# Patient Record
Sex: MALE | Race: WHITE | Employment: OTHER | URBAN - NONMETROPOLITAN AREA
[De-identification: names, ages, dates, MRNs, and addresses within clinical notes are randomized per-mention and may not be internally consistent; named-entity substitution may affect disease eponyms.]

---

## 2017-02-08 ENCOUNTER — TELEPHONE (OUTPATIENT)
Dept: FAMILY MEDICINE | Facility: OTHER | Age: 72
End: 2017-02-08

## 2017-02-08 NOTE — TELEPHONE ENCOUNTER
Panel Management Review  I called the patient and he stated when he is back in town he will see the float nurse to complete the blood pressure check.    Patient has the following on his problem list:       Composite cancer screening  Chart review shows that this patient is due/due soon for the following   Summary:    Patient is due/failing the following:   BP CHECK    Action needed:   Patient needs nurse only appointment.    Type of outreach:    Phone, left message for patient to call back.     Questions for provider review:    None                                                                                                                                    Zuleima FUENTES LPN       Chart routed to Zuleima FUENTES LPN

## 2017-03-15 ENCOUNTER — OFFICE VISIT (OUTPATIENT)
Dept: FAMILY MEDICINE | Facility: OTHER | Age: 72
End: 2017-03-15
Payer: COMMERCIAL

## 2017-03-15 VITALS — SYSTOLIC BLOOD PRESSURE: 170 MMHG | DIASTOLIC BLOOD PRESSURE: 98 MMHG | HEART RATE: 68 BPM

## 2017-03-15 DIAGNOSIS — I10 ESSENTIAL HYPERTENSION WITH GOAL BLOOD PRESSURE LESS THAN 140/90: Primary | ICD-10-CM

## 2017-03-15 DIAGNOSIS — Z01.30 BP CHECK: Primary | ICD-10-CM

## 2017-03-15 PROCEDURE — 99207 ZZC NO CHARGE NURSE ONLY: CPT

## 2017-03-15 RX ORDER — AMLODIPINE BESYLATE 10 MG/1
10 TABLET ORAL DAILY
Qty: 90 TABLET | Refills: 3 | Status: SHIPPED | OUTPATIENT
Start: 2017-03-15 | End: 2017-06-28

## 2017-03-15 NOTE — PROGRESS NOTES
Ben is a 71 year old male who comes in today for a blood pressure check because of ongoing blood pressure monitoring.  Patient is taking medication as prescribed  Patient is tolerating medications well.  Current complaints: none  Patient is monitoring Blood Pressure elsewhere.- Patient states. Have been high. Was in the 200s this morning      Vitals as recorded, a large cuff was used.  left arm  BP Readings from Last 4 Encounters:   12/22/16 152/80   12/08/16 154/80   12/08/16 154/82   07/27/16 136/84       Health Maintenance Due   Topic Date Due     HEPATITIS C SCREENING  11/30/1965     AORTIC ANEURYSM SCREENING (SYSTEM ASSIGNED)  11/30/2012     ADVANCE DIRECTIVE PLANNING Q5 YRS (NO INBASKET)  11/18/2016       Disposition: Onel Malone PA-C notified while patient in the clinic, patient instructed to start Norvasc 10 mg daily and come in 2 weeks for a provider office visit.  Patrica Dugan MA     3/15/2017

## 2017-03-15 NOTE — MR AVS SNAPSHOT
"              After Visit Summary   3/15/2017    Ben Ceron    MRN: [id number]           Patient Information     Date Of Birth          1945        Visit Information        Provider Department      3/15/2017 2:00 PM NHI ZAZUETA NURSE, [facility]        Today's Diagnoses     BP check    -  1       Follow-ups after your visit        Who to contact     If you have questions or need follow up information about today's clinic visit or your schedule please contact [facility] directly at [phone].  Normal or non-critical lab and imaging results will be communicated to you by MyChart, letter or phone within 4 business days after the clinic has received the results. If you do not hear from us within 7 days, please contact the clinic through Livekickhart or phone. If you have a critical or abnormal lab result, we will notify you by phone as soon as possible.  Submit refill requests through Helixis or call your pharmacy and they will forward the refill request to us. Please allow 3 business days for your refill to be completed.          Additional Information About Your Visit        MyChart Information     Helixis lets you send messages to your doctor, view your test results, renew your prescriptions, schedule appointments and more. To sign up, go to [email or web address] . Click on \"Log in\" on the left side of the screen, which will take you to the Welcome page. Then click on \"Sign up Now\" on the right side of the page.     You will be asked to enter the access code listed below, as well as some personal information. Please follow the directions to create your username and password.     Your access code is: [email or web address]  Expires: 2017  3:24 PM     Your access code will  in 90 days. If you need help or a new code, please call your [facility] or [phone].        Care EveryWhere ID     This is your Care EveryWhere ID. This could be used by other organizations to access your " [place] medical records  [id number]        Your Vitals Were     Pulse                   68            Blood Pressure from Last 3 Encounters:   03/15/17 (!) 170/98   12/22/16 152/80   12/08/16 154/80    Weight from Last 3 Encounters:   12/15/16 293 lb (132.9 kg)   12/08/16 289 lb 8 oz (131.3 kg)   12/08/16 289 lb 8 oz (131.3 kg)              Today, you had the following     No orders found for display       Primary Care Provider Office Phone # Fax #    Onel Green PA-C [phone]       [facility] [address] [facility] [address]        Thank you!     Thank you for choosing [facility]  for your care. Our goal is always to provide you with excellent care. Hearing back from our patients is one way we can continue to improve our services. Please take a few minutes to complete the written survey that you may receive in the mail after your visit with us. Thank you!             Your Updated Medication List - Protect others around you: Learn how to safely use, store and throw away your medicines at www.disposemymeds.org.          This list is accurate as of: 3/15/17  3:24 PM.  Always use your most recent med list.                   Brand Name Dispense Instructions for use    ADVIL 200 MG tablet   Generic drug:  ibuprofen     120    2 tabs as needed 2-3x/day       aspirin 81 MG tablet     30 tablet    Take by mouth daily       clobetasol 0.05 % cream    TEMOVATE    60 gram    abby       desoximetasone 0.25 % cream    TOPICORT    100 g    use as directed to legs       ezetimibe 10 MG tablet    ZETIA    30 tablet    Take 1 tablet (10 mg) by mouth daily       finasteride 5 MG tablet    PROSCAR    90 tablet    Take 1 tablet (5 mg) by mouth daily       losartan-hydrochlorothiazide 100-25 MG per tablet    HYZAAR    90 tablet    Take 1 tablet by mouth daily       metoprolol 25 MG 24 hr tablet    TOPROL-XL    180 tablet    Take 2 tablets (50 mg) by mouth daily       order for DME     1  Units    Equipment being ordered: CPAP and related hardware, mask and tubing as well heated humidity equipment.       vitamin D 2000 UNITS tablet      Take 1 tablet by mouth daily.       vitamin E 400 UNIT capsule

## 2017-03-29 ENCOUNTER — OFFICE VISIT (OUTPATIENT)
Dept: FAMILY MEDICINE | Facility: OTHER | Age: 72
End: 2017-03-29
Payer: COMMERCIAL

## 2017-03-29 VITALS
HEIGHT: 68 IN | TEMPERATURE: 96.6 F | SYSTOLIC BLOOD PRESSURE: 138 MMHG | HEART RATE: 68 BPM | BODY MASS INDEX: 44.54 KG/M2 | WEIGHT: 293.9 LBS | DIASTOLIC BLOOD PRESSURE: 80 MMHG | RESPIRATION RATE: 20 BRPM

## 2017-03-29 DIAGNOSIS — I10 ESSENTIAL HYPERTENSION WITH GOAL BLOOD PRESSURE LESS THAN 140/90: ICD-10-CM

## 2017-03-29 DIAGNOSIS — B37.2 YEAST DERMATITIS: Primary | ICD-10-CM

## 2017-03-29 DIAGNOSIS — E78.5 HYPERLIPIDEMIA LDL GOAL <130: ICD-10-CM

## 2017-03-29 PROCEDURE — 99213 OFFICE O/P EST LOW 20 MIN: CPT | Performed by: PHYSICIAN ASSISTANT

## 2017-03-29 RX ORDER — METOPROLOL SUCCINATE 25 MG/1
50 TABLET, EXTENDED RELEASE ORAL DAILY
Qty: 180 TABLET | Refills: 1 | Status: SHIPPED | OUTPATIENT
Start: 2017-03-29 | End: 2017-06-28

## 2017-03-29 RX ORDER — CLOBETASOL PROPIONATE 0.5 MG/G
CREAM TOPICAL
Qty: 60 G | Refills: 1 | Status: SHIPPED | OUTPATIENT
Start: 2017-03-29

## 2017-03-29 ASSESSMENT — PAIN SCALES - GENERAL: PAINLEVEL: NO PAIN (0)

## 2017-03-29 NOTE — MR AVS SNAPSHOT
"              After Visit Summary   3/29/2017    Ben Ceron    MRN: [id number]           Patient Information     Date Of Birth          1945        Visit Information        Provider Department      3/29/2017 9:20 AM Onel Green PA-C [facility]        Today's Diagnoses     Yeast dermatitis    -  1    Essential hypertension with goal blood pressure less than 140/90        Hyperlipidemia LDL goal <130           Follow-ups after your visit        Your next 10 appointments already scheduled     Jun 28, 2017  9:20 AM CDT   Office Visit with Onel Green PA-C   [facility]    [address] [facility] [address] [phone]           Bring a current list of meds and any records pertaining to this visit.  For Physicals, please bring immunization records and any forms needing to be filled out.  Please arrive 10 minutes early to complete paperwork.              Who to contact     If you have questions or need follow up information about today's clinic visit or your schedule please contact [facility] directly at [phone].  Normal or non-critical lab and imaging results will be communicated to you by MyChart, letter or phone within 4 business days after the clinic has received the results. If you do not hear from us within 7 days, please contact the clinic through Nexterrat or phone. If you have a critical or abnormal lab result, we will notify you by phone as soon as possible.  Submit refill requests through Instantis or call your pharmacy and they will forward the refill request to us. Please allow 3 business days for your refill to be completed.          Additional Information About Your Visit        MyChart Information     Instantis lets you send messages to your doctor, view your test results, renew your prescriptions, schedule appointments and more. To sign up, go to [email or web address] . Click on \"Log in\" on the left side of the screen, " "which will take you to the Welcome page. Then click on \"Sign up Now\" on the right side of the page.     You will be asked to enter the access code listed below, as well as some personal information. Please follow the directions to create your username and password.     Your access code is: [email or web address]  Expires: 2017  3:24 PM     Your access code will  in 90 days. If you need help or a new code, please call your [facility] or [phone].        Care EveryWhere ID     This is your Care EveryWhere ID. This could be used by other organizations to access your [place] medical records  [id number]        Your Vitals Were     Pulse Temperature Respirations Height BMI (Body Mass Index)       68 96.6  F (35.9  C) (Oral) 20 5' 8\" (1.727 m) 44.69 kg/m2        Blood Pressure from Last 3 Encounters:   17 138/80   03/15/17 (!) 170/98   16 152/80    Weight from Last 3 Encounters:   17 293 lb 14.4 oz (133.3 kg)   12/15/16 293 lb (132.9 kg)   16 289 lb 8 oz (131.3 kg)              Today, you had the following     No orders found for display         Today's Medication Changes          These changes are accurate as of: 3/29/17  9:39 AM.  If you have any questions, ask your nurse or doctor.               These medicines have changed or have updated prescriptions.        Dose/Directions    clobetasol 0.05 % cream   Commonly known as:  TEMOVATE   This may have changed:  See the new instructions.   Used for:  Yeast dermatitis   Changed by:  Onel Green PA-C        Small amount to affected areas BID   Quantity:  60 g   Refills:  1            Where to get your medicines      These medications were sent to [facility] [address] [place] [address] - [address] [address]     Phone:  [phone]     clobetasol 0.05 % cream    metoprolol 25 MG 24 hr tablet                Primary Care Provider Office Phone # Fax #    Onel Green PA-C [phone]       " [facility] [address] [facility] [address]        Thank you!     Thank you for choosing [facility]  for your care. Our goal is always to provide you with excellent care. Hearing back from our patients is one way we can continue to improve our services. Please take a few minutes to complete the written survey that you may receive in the mail after your visit with us. Thank you!             Your Updated Medication List - Protect others around you: Learn how to safely use, store and throw away your medicines at www.disposemymeds.org.          This list is accurate as of: 3/29/17  9:39 AM.  Always use your most recent med list.                   Brand Name Dispense Instructions for use    ADVIL 200 MG tablet   Generic drug:  ibuprofen     120    Reported on 3/29/2017       amLODIPine 10 MG tablet    NORVASC    90 tablet    Take 1 tablet (10 mg) by mouth daily       aspirin 81 MG tablet     30 tablet    Take by mouth daily       clobetasol 0.05 % cream    TEMOVATE    60 g    Small amount to affected areas BID       desoximetasone 0.25 % cream    TOPICORT    100 g    use as directed to legs       ezetimibe 10 MG tablet    ZETIA    30 tablet    Take 1 tablet (10 mg) by mouth daily       finasteride 5 MG tablet    PROSCAR    90 tablet    Take 1 tablet (5 mg) by mouth daily       losartan-hydrochlorothiazide 100-25 MG per tablet    HYZAAR    90 tablet    Take 1 tablet by mouth daily       metoprolol 25 MG 24 hr tablet    TOPROL-XL    180 tablet    Take 2 tablets (50 mg) by mouth daily       order for DME     1 Units    Equipment being ordered: CPAP and related hardware, mask and tubing as well heated humidity equipment.       TYLENOL PO      As needed       vitamin D 2000 UNITS tablet      Take 1 tablet by mouth daily.       vitamin E 400 UNIT capsule

## 2017-03-29 NOTE — NURSING NOTE
"Chief Complaint   Patient presents with     Hypertension       Initial /80 (BP Location: Right arm, Patient Position: Chair, Cuff Size: Adult Large)  Pulse 68  Temp 96.6  F (35.9  C) (Oral)  Resp 20  Ht 5' 8\" (1.727 m)  Wt 293 lb 14.4 oz (133.3 kg)  BMI 44.69 kg/m2 Estimated body mass index is 44.69 kg/(m^2) as calculated from the following:    Height as of this encounter: 5' 8\" (1.727 m).    Weight as of this encounter: 293 lb 14.4 oz (133.3 kg).  Medication Reconciliation: complete       Zuleima FUENTES LPN      "

## 2017-03-29 NOTE — PROGRESS NOTES
SUBJECTIVE:                                                    Ben Ceron is a 71 year old male who presents to clinic today for the following health issues:      Hypertension Follow-up      Outpatient blood pressures are being checked at home.  Results are slightly higher than we see today.    Low Salt Diet: not monitoring salt       Amount of exercise or physical activity: 4-5 days/week for an average of less than 15 minutes    Problems taking medications regularly: No    Medication side effects: none - question of intertriginous rash to the arm pits.    Diet: low fat/cholesterol    Problem list and histories reviewed & adjusted, as indicated.  Additional history: as documented    Patient Active Problem List   Diagnosis     Advance Care Planning     Essential hypertension with goal blood pressure less than 140/90     Morbid obesity with BMI of 45.0-49.9, adult (H)     Family history of colon cancer     Rash     LUCAS (obstructive sleep apnea)     Benign non-nodular prostatic hyperplasia, presence of lower urinary tract symptoms unspecified     Hyperlipidemia LDL goal <130     Primary osteoarthritis of left knee     Past Surgical History:   Procedure Laterality Date     ARTHROSCOPY KNEE WITH MEDIAL MENISCECTOMY  6/6/2014    Procedure: ARTHROSCOPY KNEE WITH MEDIAL MENISCECTOMY;  Surgeon: Ramana Dominguez DO;  Location: [facility] OR     COLONOSCOPY  11/29/2011    Polypectomy.     COLONOSCOPY N/A 11/12/2014    Procedure: COLONOSCOPY;  Surgeon: Brooks Burris MD;  Location: [facility] GI     HC COLONOSCOPY W BIOPSY  11/24/08     HC COLONOSCOPY W/WO BRUSH/WASH  11/21/2005    Polypectomy.     HC VASECTOMY UNILAT/BILAT W POSTOP SEMEN  1977    Vasectomy       Social History   Substance Use Topics     Smoking status: Never Smoker     Smokeless tobacco: Never Used     Alcohol use 0.0 oz/week     0 Standard drinks or equivalent per week      Comment: occasional     Family History   Problem Relation Age of Onset     CANCER  "Mother      Lung Cancer     Cancer - colorectal Father      Stomach Cancer     CANCER Brother            Reviewed and updated as needed this visit by clinical staff  Tobacco  Allergies  Meds  Med Hx  Surg Hx  Fam Hx  Soc Hx      Reviewed and updated as needed this visit by Provider         ROS:  Constitutional, HEENT, cardiovascular, pulmonary, gi and gu systems are negative, except as otherwise noted.    OBJECTIVE:                                                    /80 (BP Location: Right arm, Patient Position: Chair, Cuff Size: Adult Large)  Pulse 68  Temp 96.6  F (35.9  C) (Oral)  Resp 20  Ht 5' 8\" (1.727 m)  Wt 293 lb 14.4 oz (133.3 kg)  BMI 44.69 kg/m2  Body mass index is 44.69 kg/(m^2).  GENERAL: healthy, alert and no distress  RESP: lungs clear to auscultation - no rales, rhonchi or wheezes  CV: regular rate and rhythm, normal S1 S2, no S3 or S4, no murmur, click or rub, no peripheral edema and peripheral pulses strong  ABDOMEN: soft, nontender, no hepatosplenomegaly, no masses and bowel sounds normal  MS: no gross musculoskeletal defects noted, no edema    Diagnostic Test Results:     ASSESSMENT/PLAN:                                                    1. Yeast dermatitis  Not of great concern - trial of medications.  - clobetasol (TEMOVATE) 0.05 % cream; Small amount to affected areas BID  Dispense: 60 g; Refill: 1    2. Essential hypertension with goal blood pressure less than 140/90  Doing fairly well today.  ROV 3 months.  - metoprolol (TOPROL-XL) 25 MG 24 hr tablet; Take 2 tablets (50 mg) by mouth daily  Dispense: 180 tablet; Refill: 1    3. Hyperlipidemia LDL goal <130  Noted - recheck in 3 months.  Onel Malone PA-C  [facility]    "

## 2017-05-01 ENCOUNTER — ALLIED HEALTH/NURSE VISIT (OUTPATIENT)
Dept: FAMILY MEDICINE | Facility: OTHER | Age: 72
End: 2017-05-01
Payer: COMMERCIAL

## 2017-05-01 VITALS — DIASTOLIC BLOOD PRESSURE: 80 MMHG | SYSTOLIC BLOOD PRESSURE: 154 MMHG | HEART RATE: 68 BPM

## 2017-05-01 DIAGNOSIS — Z01.30 BP CHECK: Primary | ICD-10-CM

## 2017-05-01 PROCEDURE — 99207 ZZC NO CHARGE NURSE ONLY: CPT

## 2017-05-01 NOTE — NURSING NOTE
Ben is a 71 year old male who comes in today for a blood pressure check because of ongoing blood pressure monitoring.  Patient is not taking medication as prescribed  Patient is not tolerating medications well.  Current complaints: light-headedness and stiffness and aches  Patient is monitoring Blood Pressure elsewhere.      Vitals as recorded, a large cuff was used.  left arm  BP Readings from Last 4 Encounters:   05/01/17 154/80   03/29/17 138/80   03/15/17 (!) 170/98   12/22/16 152/80       Health Maintenance Due   Topic Date Due     HEPATITIS C SCREENING  11/30/1965     AORTIC ANEURYSM SCREENING (SYSTEM ASSIGNED)  11/30/2012     ADVANCE DIRECTIVE PLANNING Q5 YRS (NO INBASKET)  11/18/2016       Disposition: Onel Malone PA-C  notified while patient in the clinic, patient instructed to schedule office visit    Luz Maria Garland MA 5/1/2017

## 2017-05-01 NOTE — MR AVS SNAPSHOT
After Visit Summary   5/1/2017    Ben Ceron    MRN: [id number]           Patient Information     Date Of Birth          1945        Visit Information        Provider Department      5/1/2017 10:15 AM NHI ZAZUETA NURSE, [facility]        Today's Diagnoses     BP check    -  1       Follow-ups after your visit        Your next 10 appointments already scheduled     May 02, 2017  3:00 PM CDT   [facility] Onel Green [facility]-C   [facility]    [address] [facility] [address] [phone]           Bring a current list of meds and any records pertaining to this visit.  For Physicals, please bring immunization records and any forms needing to be filled out.  Please arrive 10 minutes early to complete paperwork.            Jun 28, 2017  9:20 AM CDT   Office Visit with Onel Green PA-C   [facility]    [address] [facility] [address] [phone]           Bring a current list of meds and any records pertaining to this visit.  For Physicals, please bring immunization records and any forms needing to be filled out.  Please arrive 10 minutes early to complete paperwork.              Who to contact     If you have questions or need follow up information about today's clinic visit or your schedule please contact [facility] directly at [phone].  Normal or non-critical lab and imaging results will be communicated to you by MyChart, letter or phone within 4 business days after the clinic has received the results. If you do not hear from us within 7 days, please contact the clinic through MyChart or phone. If you have a critical or abnormal lab result, we will notify you by phone as soon as possible.  Submit refill requests through ConnectToHome or call your pharmacy and they will forward the refill request to us. Please allow 3 business days for your refill to be completed.        "   Additional Information About Your Visit        MyChart Information     BrightSun lets you send messages to your doctor, view your test results, renew your prescriptions, schedule appointments and more. To sign up, go to [email or web address] . Click on \"Log in\" on the left side of the screen, which will take you to the Welcome page. Then click on \"Sign up Now\" on the right side of the page.     You will be asked to enter the access code listed below, as well as some personal information. Please follow the directions to create your username and password.     Your access code is: [email or web address]  Expires: 2017  3:24 PM     Your access code will  in 90 days. If you need help or a new code, please call your [place] clinic or [phone].        Care EveryWhere ID     This is your Care EveryWhere ID. This could be used by other organizations to access your [place] medical records  [id number]        Your Vitals Were     Pulse                   68            Blood Pressure from Last 3 Encounters:   17 154/80   17 138/80   03/15/17 (!) 170/98    Weight from Last 3 Encounters:   17 293 lb 14.4 oz (133.3 kg)   12/15/16 293 lb (132.9 kg)   16 289 lb 8 oz (131.3 kg)              Today, you had the following     No orders found for display       Primary Care Provider Office Phone # Fax #    Onel Green PA-C [phone]       [facility] [address] [facility] [address]        Thank you!     Thank you for choosing [facility]  for your care. Our goal is always to provide you with excellent care. Hearing back from our patients is one way we can continue to improve our services. Please take a few minutes to complete the written survey that you may receive in the mail after your visit with us. Thank you!             Your Updated Medication List - Protect others around you: Learn how to safely use, store and throw away your medicines at " www.disposemymeds.org.          This list is accurate as of: 5/1/17 12:00 PM.  Always use your most recent med list.                   Brand Name Dispense Instructions for use    ADVIL 200 MG tablet   Generic drug:  ibuprofen     120    Reported on 3/29/2017       amLODIPine 10 MG tablet    NORVASC    90 tablet    Take 1 tablet (10 mg) by mouth daily       aspirin 81 MG tablet     30 tablet    Take by mouth daily       clobetasol 0.05 % cream    TEMOVATE    60 g    Small amount to affected areas BID       desoximetasone 0.25 % cream    TOPICORT    100 g    use as directed to legs       ezetimibe 10 MG tablet    ZETIA    30 tablet    Take 1 tablet (10 mg) by mouth daily       finasteride 5 MG tablet    PROSCAR    90 tablet    Take 1 tablet (5 mg) by mouth daily       losartan-hydrochlorothiazide 100-25 MG per tablet    HYZAAR    90 tablet    Take 1 tablet by mouth daily       metoprolol 25 MG 24 hr tablet    TOPROL-XL    180 tablet    Take 2 tablets (50 mg) by mouth daily       order for DME     1 Units    Equipment being ordered: CPAP and related hardware, mask and tubing as well heated humidity equipment.       TYLENOL PO      As needed       vitamin D 2000 UNITS tablet      Take 1 tablet by mouth daily.       vitamin E 400 UNIT capsule

## 2017-05-02 ENCOUNTER — OFFICE VISIT (OUTPATIENT)
Dept: FAMILY MEDICINE | Facility: OTHER | Age: 72
End: 2017-05-02
Payer: COMMERCIAL

## 2017-05-02 VITALS
BODY MASS INDEX: 45.13 KG/M2 | TEMPERATURE: 98.7 F | SYSTOLIC BLOOD PRESSURE: 150 MMHG | HEART RATE: 68 BPM | OXYGEN SATURATION: 98 % | DIASTOLIC BLOOD PRESSURE: 80 MMHG | WEIGHT: 296.8 LBS | RESPIRATION RATE: 20 BRPM

## 2017-05-02 DIAGNOSIS — I10 ESSENTIAL HYPERTENSION WITH GOAL BLOOD PRESSURE LESS THAN 140/90: Primary | ICD-10-CM

## 2017-05-02 DIAGNOSIS — I10 HYPERTENSION GOAL BP (BLOOD PRESSURE) < 140/90: ICD-10-CM

## 2017-05-02 DIAGNOSIS — E78.5 HYPERLIPIDEMIA LDL GOAL <130: ICD-10-CM

## 2017-05-02 DIAGNOSIS — E66.01 MORBID OBESITY WITH BMI OF 45.0-49.9, ADULT (H): ICD-10-CM

## 2017-05-02 DIAGNOSIS — M79.10 MYALGIA: ICD-10-CM

## 2017-05-02 LAB
ERYTHROCYTE [DISTWIDTH] IN BLOOD BY AUTOMATED COUNT: 11.9 % (ref 10–15)
HCT VFR BLD AUTO: 42.3 % (ref 40–53)
HGB BLD-MCNC: 15 G/DL (ref 13.3–17.7)
MCH RBC QN AUTO: 31.6 PG (ref 26.5–33)
MCHC RBC AUTO-ENTMCNC: 35.5 G/DL (ref 31.5–36.5)
MCV RBC AUTO: 89 FL (ref 78–100)
PLATELET # BLD AUTO: 281 10E9/L (ref 150–450)
RBC # BLD AUTO: 4.74 10E12/L (ref 4.4–5.9)
WBC # BLD AUTO: 12 10E9/L (ref 4–11)

## 2017-05-02 PROCEDURE — 83721 ASSAY OF BLOOD LIPOPROTEIN: CPT | Performed by: PHYSICIAN ASSISTANT

## 2017-05-02 PROCEDURE — 82550 ASSAY OF CK (CPK): CPT | Performed by: PHYSICIAN ASSISTANT

## 2017-05-02 PROCEDURE — 99214 OFFICE O/P EST MOD 30 MIN: CPT | Performed by: PHYSICIAN ASSISTANT

## 2017-05-02 PROCEDURE — 85027 COMPLETE CBC AUTOMATED: CPT | Performed by: PHYSICIAN ASSISTANT

## 2017-05-02 PROCEDURE — 36415 COLL VENOUS BLD VENIPUNCTURE: CPT | Performed by: PHYSICIAN ASSISTANT

## 2017-05-02 PROCEDURE — 80053 COMPREHEN METABOLIC PANEL: CPT | Performed by: PHYSICIAN ASSISTANT

## 2017-05-02 ASSESSMENT — PAIN SCALES - GENERAL: PAINLEVEL: EXTREME PAIN (8)

## 2017-05-02 NOTE — PROGRESS NOTES
SUBJECTIVE:                                                    Ben Ceron is a 71 year old male who presents to clinic today for the following health issues:      Hypertension Follow-up      Outpatient blood pressures are being checked at home and [facility].  Results are he help quite ill and had to lie down to recover. Blood pressure was checked and he reports 80/50 to the left arm and 100/52 to the right arm about the same point in time. Currently today is 150/80. He states he has had a couple of episodes similar to the feeling that he had when he had these abnormal blood pressures and wonders about cardiac arrhythmias and decreased pump function. I must admit that this sounds logical. We discussed this at length.not monitoring salt.    Low Salt Diet: not monitoring salt       Amount of exercise or physical activity: None    Problems taking medications regularly: No    Medication side effects: none    Diet: Not pay much attention to diet.    Problem list and histories reviewed & adjusted, as indicated.  Additional history: as documented    Patient Active Problem List   Diagnosis     Advance Care Planning     Essential hypertension with goal blood pressure less than 140/90     Morbid obesity with BMI of 45.0-49.9, adult (H)     Family history of colon cancer     Rash     LUCAS (obstructive sleep apnea)     Benign non-nodular prostatic hyperplasia, presence of lower urinary tract symptoms unspecified     Hyperlipidemia LDL goal <130     Primary osteoarthritis of left knee     Past Surgical History:   Procedure Laterality Date     ARTHROSCOPY KNEE WITH MEDIAL MENISCECTOMY  6/6/2014    Procedure: ARTHROSCOPY KNEE WITH MEDIAL MENISCECTOMY;  Surgeon: Ramana Dominguez DO;  Location: [facility]     COLONOSCOPY  11/29/2011    Polypectomy.     COLONOSCOPY N/A 11/12/2014    Procedure: COLONOSCOPY;  Surgeon: Brooks Burris MD;  Location:  GI     HC COLONOSCOPY W BIOPSY  11/24/08     HC COLONOSCOPY W/WO BRUSH/WASH   11/21/2005    Polypectomy.     HC VASECTOMY UNILAT/BILAT W POSTOP SEMEN  1977    Vasectomy       Social History   Substance Use Topics     Smoking status: Never Smoker     Smokeless tobacco: Never Used     Alcohol use 0.0 oz/week     0 Standard drinks or equivalent per week      Comment: occasional     Family History   Problem Relation Age of Onset     CANCER Mother      Lung Cancer     Cancer - colorectal Father      Stomach Cancer     CANCER Brother            Reviewed and updated as needed this visit by clinical staff  Tobacco  Allergies  Med Hx  Surg Hx  Fam Hx  Soc Hx      Reviewed and updated as needed this visit by Provider         ROS:  Constitutional, HEENT, cardiovascular, pulmonary, gi and gu systems are negative, except as otherwise noted.    OBJECTIVE:                                                    /80 (BP Location: Left arm, Patient Position: Chair, Cuff Size: Adult Large)  Pulse 68  Temp 98.7  F (37.1  C) (Oral)  Resp 20  Wt 296 lb 12.8 oz (134.6 kg)  SpO2 98%  BMI 45.13 kg/m2  Body mass index is 45.13 kg/(m^2).  GENERAL: healthy, alert and no distress  RESP: lungs clear to auscultation - no rales, rhonchi or wheezes  CV: regular rate and rhythm, normal S1 S2, no S3 or S4, no murmur, click or rub, no peripheral edema and peripheral pulses strong  ABDOMEN: soft, nontender, no hepatosplenomegaly, no masses and bowel sounds normal  MS: no gross musculoskeletal defects noted, no edema  PSYCH: mentation appears normal, affect normal/bright    Routine today to suggest an abnormal heart rate and rhythm. We'll have him submit for cardiac testing.      ASSESSMENT/PLAN:                                                    1. Essential hypertension with goal blood pressure less than 140/90  2. Hyperlipidemia LDL goal <130  3. Morbid obesity with BMI of 45.0-49.9, adult (H)  4. Hypertension goal BP (blood pressure) < 140/90  . Myalgia  Suspected to have a pump function issue.  - Exercise Stress  Echocardiogram; Future  - CBC with platelets  - Comprehensive metabolic panel  - CK total  - LDL cholesterol directe    ALEJANDRO PRFRENCH Malone PA-C  [facility]

## 2017-05-02 NOTE — LETTER
STRESS ECHO TEST   PAMPHLET: STRESS ECHOCARDIOGRAPH    Date of Appointment:__MONDAY, May 8__________________Time: _10:00am_______                                                                                     Speed at the central registration desk.    INSTRUCTIONS    1. NO CAFFEINE  the day of the test.  Examples: tea, coffee, pop, Anacin, Excedrin and chocolate products.  Other products/medications may contain caffeine, if in doubt, read the labels or call your pharmacist.    2. You may eat lightly up to three hours prior to the test.    3. No smoking on the day of the test.    4. Plan sixty to ninety minutes for your test.    5. Wear a comfortable two piece outfit and comfortable walking shoes.    6. If you wear a Nitro-Patch, do not put one on the morning of the test.    7. Take your medications in the morning with the exception of beta blockers. (refer to list below)    8.  Do not take your BETA BLOCKERS the day before and the day of your test.      BETA BLOCKERS  Acebutolol, Atenolol, Beta-Chron, Betapace, Betaxolol, Bisoprolol,Blocadren, Carteolol, Cartrol, Carvediol, Coreg, Corgard, Corzide, Dectral, Fumarate, Inderal, Inderal LA, Inderide, Kerlone, Labetolol, Levatolol, Lopressor, Lopressor HCT, Metoprolol, Metoprolol XL, Nadolol, Normodyne, Penbutolol, Pindolol, Propanolol, Propanolol LA, Sectral, Sotalol, Tenoretic, Tenormin, Timolide, Timolol, Toprol XL, Trandate, Visken, Zebeta, Ziac    If you have significant problems with your lower extremities which would restrict you from walking at a brisk pace for five to ten minutes, please notify your physician.    Please bring a list of your medications along with you to this appointment.    If you have any questions please call us at [phone] or [phone] Monday through Friday 8am to 5pm.                    PATIENT INFORMATION  - STRESS ECHOCARDIOGRAPHY    The stress echocardiogram is a non-invasive test that combines a treadmill test and an  echocardiogram.  An echocardiogram is done at rest prior to exercise and again after exercising.  The echocardiogram uses sound waves (ultrasound) to provide an image of the heart.    You will be asked to fill out an information sheet regarding your current health status.  Questions will be asked related to your heart history.    The nurse or exercise physiologist will explain the procedure to you.  The upper chest will be exposed for placement of electrodes.  Women may wear a hospital gown during the test.  Men with chest hair will be shaved in the areas where the electrodes are placed.    You will be connected to an electrocardiogram machine.  An electrocardiogram and blood pressure will be monitored with you lying down and again standing still prior to exercising.    An echocardiogram will be done prior to exercising.  After this is completed, you will be ready for the treadmill portion of the test.    With staff in attendance, the treadmill will be started.  Periodically the speed and the grade of the treadmill will increase.  Let the staff know if you are experiencing chest pain, shortness of breath or other symptoms.    The length of time on the treadmill is individual.  However, the usual length of time is from five to ten minutes of walking.    After the heart rate is achieved to give us an accurate test, you will need to lie down very quickly and have an echocardiogram done with your heart rate elevated.    Please allow 60-90 minutes for the test to be completed.    The physician will review the results with you immediately after the test and send a final report to your Primary Care Provider.    If you have any special needs, please notify the department.  If you need an , please notify the department.  If you have questions, you may call the Outpatient Specialty Scheduling Department at [phone] or [phone] Monday through Friday 8am to 5pm.

## 2017-05-02 NOTE — MR AVS SNAPSHOT
After Visit Summary   5/2/2017    Ben Ceron    MRN: [id number]           Patient Information     Date Of Birth          1945        Visit Information        Provider Department      5/2/2017 3:00 PM Onel Green PA-C [facility]        Today's Diagnoses     Essential hypertension with goal blood pressure less than 140/90    -  1    Hyperlipidemia LDL goal <130        Morbid obesity with BMI of 45.0-49.9, adult (H)        Hypertension goal BP (blood pressure) < 140/90        Myalgia           Follow-ups after your visit        Your next 10 appointments already scheduled     Jun 28, 2017  9:20 AM CDT   Office Visit with Onel Green PA-C   [facility]    [address] [facility] [address] [phone]           Bring a current list of meds and any records pertaining to this visit.  For Physicals, please bring immunization records and any forms needing to be filled out.  Please arrive 10 minutes early to complete paperwork.              Future tests that were ordered for you today     Open Future Orders        Priority Expected Expires Ordered    Exercise Stress Echocardiogram Routine  5/2/2018 5/2/2017            Who to contact     If you have questions or need follow up information about today's clinic visit or your schedule please contact [facility] directly at [phone].  Normal or non-critical lab and imaging results will be communicated to you by MyChart, letter or phone within 4 business days after the clinic has received the results. If you do not hear from us within 7 days, please contact the clinic through MyChart or phone. If you have a critical or abnormal lab result, we will notify you by phone as soon as possible.  Submit refill requests through [employer] or call your pharmacy and they will forward the refill request to us. Please allow 3 business days for your refill to be completed.          Additional  "Information About Your Visit        MyChart Information     [employer] lets you send messages to your doctor, view your test results, renew your prescriptions, schedule appointments and more. To sign up, go to [email or web address] . Click on \"Log in\" on the left side of the screen, which will take you to the Welcome page. Then click on \"Sign up Now\" on the right side of the page.     You will be asked to enter the access code listed below, as well as some personal information. Please follow the directions to create your username and password.     Your access code is: [email or web address]  Expires: 2017  3:24 PM     Your access code will  in 90 days. If you need help or a new code, please call your [place] clinic or [phone].        Care EveryWhere ID     This is your Care EveryWhere ID. This could be used by other organizations to access your [place] medical records  [id number]        Your Vitals Were     Pulse Temperature Respirations Pulse Oximetry BMI (Body Mass Index)       68 98.7  F (37.1  C) (Oral) 20 98% 45.13 kg/m2        Blood Pressure from Last 3 Encounters:   17 150/80   17 154/80   17 138/80    Weight from Last 3 Encounters:   17 296 lb 12.8 oz (134.6 kg)   17 293 lb 14.4 oz (133.3 kg)   12/15/16 293 lb (132.9 kg)              We Performed the Following     CBC with platelets     CK total     Comprehensive metabolic panel     LDL cholesterol direct        Primary Care Provider Office Phone # Fax #    Onel Green PA-C [phone]       [facility] [address] [facility] [address]        Thank you!     Thank you for choosing [facility]  for your care. Our goal is always to provide you with excellent care. Hearing back from our patients is one way we can continue to improve our services. Please take a few minutes to complete the written survey that you may receive in the mail after your visit with us. Thank you!           "   Your Updated Medication List - Protect others around you: Learn how to safely use, store and throw away your medicines at www.disposemymeds.org.          This list is accurate as of: 5/2/17  3:25 PM.  Always use your most recent med list.                   Brand Name Dispense Instructions for use    ADVIL 200 MG tablet   Generic drug:  ibuprofen     120    Reported on 5/2/2017       amLODIPine 10 MG tablet    NORVASC    90 tablet    Take 1 tablet (10 mg) by mouth daily       aspirin 81 MG tablet     30 tablet    Take by mouth daily       clobetasol 0.05 % cream    TEMOVATE    60 g    Small amount to affected areas BID       desoximetasone 0.25 % cream    TOPICORT    100 g    use as directed to legs       ezetimibe 10 MG tablet    ZETIA    30 tablet    Take 1 tablet (10 mg) by mouth daily       finasteride 5 MG tablet    PROSCAR    90 tablet    Take 1 tablet (5 mg) by mouth daily       losartan-hydrochlorothiazide 100-25 MG per tablet    HYZAAR    90 tablet    Take 1 tablet by mouth daily       metoprolol 25 MG 24 hr tablet    TOPROL-XL    180 tablet    Take 2 tablets (50 mg) by mouth daily       order for DME     1 Units    Equipment being ordered: CPAP and related hardware, mask and tubing as well heated humidity equipment.       TYLENOL PO      As needed       vitamin D 2000 UNITS tablet      Take 1 tablet by mouth daily.       vitamin E 400 UNIT capsule

## 2017-05-02 NOTE — NURSING NOTE
"Chief Complaint   Patient presents with     Hypertension       Initial /80 (BP Location: Left arm, Patient Position: Chair, Cuff Size: Adult Large)  Pulse 68  Temp 98.7  F (37.1  C) (Oral)  Resp 20  Wt 296 lb 12.8 oz (134.6 kg)  BMI 45.13 kg/m2 Estimated body mass index is 45.13 kg/(m^2) as calculated from the following:    Height as of 3/29/17: 5' 8\" (1.727 m).    Weight as of this encounter: 296 lb 12.8 oz (134.6 kg).  Medication Reconciliation: complete       Zuleima FUENTES LPN    "

## 2017-05-03 ENCOUNTER — TELEPHONE (OUTPATIENT)
Dept: FAMILY MEDICINE | Facility: OTHER | Age: 72
End: 2017-05-03

## 2017-05-03 DIAGNOSIS — I10 ESSENTIAL HYPERTENSION WITH GOAL BLOOD PRESSURE LESS THAN 140/90: ICD-10-CM

## 2017-05-03 DIAGNOSIS — E11.9 CONTROLLED TYPE 2 DIABETES MELLITUS WITHOUT COMPLICATION, WITHOUT LONG-TERM CURRENT USE OF INSULIN (H): ICD-10-CM

## 2017-05-03 DIAGNOSIS — E66.01 MORBID OBESITY WITH BMI OF 45.0-49.9, ADULT (H): ICD-10-CM

## 2017-05-03 DIAGNOSIS — E78.5 HYPERLIPIDEMIA LDL GOAL <100: ICD-10-CM

## 2017-05-03 DIAGNOSIS — E78.5 HYPERLIPIDEMIA LDL GOAL <100: Primary | ICD-10-CM

## 2017-05-03 LAB
ALBUMIN SERPL-MCNC: 3.9 G/DL (ref 3.4–5)
ALP SERPL-CCNC: 157 U/L (ref 40–150)
ALT SERPL W P-5'-P-CCNC: 28 U/L (ref 0–70)
ANION GAP SERPL CALCULATED.3IONS-SCNC: 13 MMOL/L (ref 3–14)
AST SERPL W P-5'-P-CCNC: 12 U/L (ref 0–45)
BILIRUB SERPL-MCNC: 0.3 MG/DL (ref 0.2–1.3)
BUN SERPL-MCNC: 24 MG/DL (ref 7–30)
CALCIUM SERPL-MCNC: 8.9 MG/DL (ref 8.5–10.1)
CHLORIDE SERPL-SCNC: 102 MMOL/L (ref 94–109)
CK SERPL-CCNC: 117 U/L (ref 30–300)
CO2 SERPL-SCNC: 24 MMOL/L (ref 20–32)
CREAT SERPL-MCNC: 0.87 MG/DL (ref 0.66–1.25)
GFR SERPL CREATININE-BSD FRML MDRD: 87 ML/MIN/1.7M2
GLUCOSE SERPL-MCNC: 215 MG/DL (ref 70–99)
HBA1C MFR BLD: 6.6 % (ref 4.3–6)
LDLC SERPL DIRECT ASSAY-MCNC: 81 MG/DL
POTASSIUM SERPL-SCNC: 3.7 MMOL/L (ref 3.4–5.3)
PROT SERPL-MCNC: 7.4 G/DL (ref 6.8–8.8)
SODIUM SERPL-SCNC: 139 MMOL/L (ref 133–144)

## 2017-05-03 PROCEDURE — 36415 COLL VENOUS BLD VENIPUNCTURE: CPT | Performed by: PHYSICIAN ASSISTANT

## 2017-05-03 PROCEDURE — 83036 HEMOGLOBIN GLYCOSYLATED A1C: CPT | Performed by: PHYSICIAN ASSISTANT

## 2017-05-08 ENCOUNTER — HOSPITAL ENCOUNTER (OUTPATIENT)
Dept: CARDIOLOGY | Facility: CLINIC | Age: 72
Discharge: HOME | End: 2017-05-08
Attending: PHYSICIAN ASSISTANT | Admitting: PHYSICIAN ASSISTANT
Payer: MEDICARE

## 2017-05-08 ENCOUNTER — ALLIED HEALTH/NURSE VISIT (OUTPATIENT)
Dept: EDUCATION SERVICES | Facility: CLINIC | Age: 72
End: 2017-05-08
Payer: COMMERCIAL

## 2017-05-08 DIAGNOSIS — E66.01 MORBID OBESITY WITH BMI OF 45.0-49.9, ADULT (H): ICD-10-CM

## 2017-05-08 DIAGNOSIS — E11.9 DIABETES MELLITUS WITHOUT COMPLICATION (H): Primary | ICD-10-CM

## 2017-05-08 DIAGNOSIS — I10 ESSENTIAL HYPERTENSION WITH GOAL BLOOD PRESSURE LESS THAN 140/90: ICD-10-CM

## 2017-05-08 DIAGNOSIS — M79.10 MYALGIA: ICD-10-CM

## 2017-05-08 DIAGNOSIS — I10 HYPERTENSION GOAL BP (BLOOD PRESSURE) < 140/90: ICD-10-CM

## 2017-05-08 PROCEDURE — 93017 CV STRESS TEST TRACING ONLY: CPT | Performed by: REHABILITATION PRACTITIONER

## 2017-05-08 PROCEDURE — 93018 CV STRESS TEST I&R ONLY: CPT | Performed by: INTERNAL MEDICINE

## 2017-05-08 PROCEDURE — 40000264 ECHO STRESS WITH OPTISON

## 2017-05-08 PROCEDURE — G0108 DIAB MANAGE TRN  PER INDIV: HCPCS

## 2017-05-08 PROCEDURE — 25500064 ZZH RX 255 OP 636: Performed by: INTERNAL MEDICINE

## 2017-05-08 PROCEDURE — 93350 STRESS TTE ONLY: CPT | Mod: 26 | Performed by: INTERNAL MEDICINE

## 2017-05-08 PROCEDURE — 93325 DOPPLER ECHO COLOR FLOW MAPG: CPT | Mod: 26 | Performed by: INTERNAL MEDICINE

## 2017-05-08 PROCEDURE — 93321 DOPPLER ECHO F-UP/LMTD STD: CPT | Mod: 26 | Performed by: INTERNAL MEDICINE

## 2017-05-08 PROCEDURE — 93016 CV STRESS TEST SUPVJ ONLY: CPT | Performed by: INTERNAL MEDICINE

## 2017-05-08 RX ADMIN — HUMAN ALBUMIN MICROSPHERES AND PERFLUTREN 2 ML: 10; .22 INJECTION, SOLUTION INTRAVENOUS at 10:44

## 2017-05-08 NOTE — MR AVS SNAPSHOT
After Visit Summary   5/8/2017    Ben Ceron    MRN: [id number]           Patient Information     Date Of Birth          1945        Visit Information        Provider Department      5/8/2017 11:00 AM [facility] DIABETIC ED [facility]        Today's Diagnoses     Diabetes mellitus without complication (H)    -  1       Follow-ups after your visit        Your next 10 appointments already scheduled     Jun 28, 2017  9:20 AM CDT   Office Visit with Onel Green PA-C   [facility] ([facility]    [address] [facility] [address] [phone]           Bring a current list of meds and any records pertaining to this visit.  For Physicals, please bring immunization records and any forms needing to be filled out.  Please arrive 10 minutes early to complete paperwork.              Future tests that were ordered for you today     Open Future Orders        Priority Expected Expires Ordered    ECHO STRESS WITH OPTISON Routine  5/2/2018 5/2/2017            Who to contact     If you have questions or need follow up information about today's clinic visit or your schedule please contact [facility] directly at [phone].  Normal or non-critical lab and imaging results will be communicated to you by [employer]zCentral[employer], letter or phone within 4 business days after the clinic has received the results. If you do not hear from us within 7 days, please contact the clinic through [employer] or phone. If you have a critical or abnormal lab result, we will notify you by phone as soon as possible.  Submit refill requests through [employer] or call your pharmacy and they will forward the refill request to us. Please allow 3 business days for your refill to be completed.          Additional Information About Your Visit        [employer] Information     [employer] lets you send messages to your doctor, view your test results, renew your prescriptions, schedule appointments  "and more. To sign up, go to [email or web address] . Click on \"Log in\" on the left side of the screen, which will take you to the Welcome page. Then click on \"Sign up Now\" on the right side of the page.     You will be asked to enter the access code listed below, as well as some personal information. Please follow the directions to create your username and password.     Your access code is: [email or web address]  Expires: 2017  3:24 PM     Your access code will  in 90 days. If you need help or a new code, please call your [place] clinic or [phone].        Care EveryWhere ID     This is your Care EveryWhere ID. This could be used by other organizations to access your [place] medical records  [id number]         Blood Pressure from Last 3 Encounters:   17 150/80   17 154/80   17 138/80    Weight from Last 3 Encounters:   17 134.6 kg (296 lb 12.8 oz)   17 133.3 kg (293 lb 14.4 oz)   12/15/16 132.9 kg (293 lb)              We Performed the Following     DIABETES EDUCATION - Individual  []        Primary Care Provider Office Phone # Fax #    Onel Green PA-C [phone]       [facility] [address] [facility] [address]        Thank you!     Thank you for choosing [facility]  for your care. Our goal is always to provide you with excellent care. Hearing back from our patients is one way we can continue to improve our services. Please take a few minutes to complete the written survey that you may receive in the mail after your visit with us. Thank you!             Your Updated Medication List - Protect others around you: Learn how to safely use, store and throw away your medicines at www.disposemymeds.org.          This list is accurate as of: 17 12:59 PM.  Always use your most recent med list.                   Brand Name Dispense Instructions for use    ADVIL 200 MG tablet   Generic drug:  ibuprofen     120    Reported on " 5/2/2017       amLODIPine 10 MG tablet    NORVASC    90 tablet    Take 1 tablet (10 mg) by mouth daily       aspirin 81 MG tablet     30 tablet    Take by mouth daily       clobetasol 0.05 % cream    TEMOVATE    60 g    Small amount to affected areas BID       desoximetasone 0.25 % cream    TOPICORT    100 g    use as directed to legs       ezetimibe 10 MG tablet    ZETIA    30 tablet    Take 1 tablet (10 mg) by mouth daily       finasteride 5 MG tablet    PROSCAR    90 tablet    Take 1 tablet (5 mg) by mouth daily       losartan-hydrochlorothiazide 100-25 MG per tablet    HYZAAR    90 tablet    Take 1 tablet by mouth daily       metFORMIN 500 MG tablet    GLUCOPHAGE    180 tablet    Take 1 tablet (500 mg) by mouth 2 times daily (with meals)       metoprolol 25 MG 24 hr tablet    TOPROL-XL    180 tablet    Take 2 tablets (50 mg) by mouth daily       order for DME     1 Units    Equipment being ordered: CPAP and related hardware, mask and tubing as well heated humidity equipment.       TYLENOL PO      As needed       vitamin D 2000 UNITS tablet      Take 1 tablet by mouth daily.       vitamin E 400 UNIT capsule

## 2017-05-08 NOTE — Clinical Note
Patient seen for initial diabetes ed today. He was not ready to start a meter today. Still adjusting to diabetes diagnosis. He is doing well reducing portions. No follow up scheduled with me at this point but I did encourage him to have A1c rechecked before end of year.   Sandi Jurado RDN, LD, CDE

## 2017-05-08 NOTE — PROGRESS NOTES
Diabetes Self Management Training: Initial Assessment Visit for Newly Diagnosed Patients (Complete AADE Goals Flowsheet)    Ben Ceron presents today for education related to Type 2 diabetes.    He is accompanied by spouse Bia    Patient's diabetes management related comments/concerns: would like to get a FBG checked since last one was afternoon.     Patient's emotional response to diabetes: expresses readiness to learn and denial    Patient would like this visit to be focused around the following diabetes-related behaviors and goals:     ASSESSMENT:  Patient Problem List and Family Medical History reviewed for relevant medical history, current medical status, and diabetes risk factors.    Current Diabetes Management per Patient:  Taking diabetes medications?   yes:     Diabetes Medication(s)     Biguanides Sig    metFORMIN (GLUCOPHAGE) 500 MG tablet Take 1 tablet (500 mg) by mouth 2 times daily (with meals)      Side effects? Yes loose stools but tolerable    Past Diabetes Education: Newly diagnosed    Patient glucose self monitoring as follows: never.     Patient's most recent   Lab Results   Component Value Date    A1C 6.6 05/03/2017    is meeting goal of <7.0    Nutrition:  Patient has been reducing portions    Breakfast - Raisin Bran cereal, strawberries, small amount of sugar mix with artificial sweetener, some days coffee.   Lunch - after golf had [employer] burger / ham sandwich and small piece of cake.   Dinner -  salad   Snacks - fruit for evening snack.   Venison roast, squash, coleslaw with apple,     Beverages: 2% milk, more water lately, orange juice a bottle recently, occasional diet soda, rarely alcohol    Cultural/Christian diet restrictions: No     Biggest Challenge to Healthy Eating: portion control    Physical Activity:    Type: golfing and walking   Limitations: knee trouble and left hip arthritis?     Diabetes Risk Factors:  family history, age over 45 years and  "overweight/obesity    Diabetes Complications:  Not discussed today.    Vitals:  There were no vitals taken for this visit.  Estimated body mass index is 45.13 kg/(m^2) as calculated from the following:    Height as of 3/29/17: 1.727 m (5' 8\").    Weight as of 5/2/17: 134.6 kg (296 lb 12.8 oz).   Last 3 BP:   BP Readings from Last 3 Encounters:   05/02/17 150/80   05/01/17 154/80   03/29/17 138/80       History   Smoking Status     Never Smoker   Smokeless Tobacco     Never Used       Labs:  Lab Results   Component Value Date    A1C 6.6 05/03/2017     Lab Results   Component Value Date     05/02/2017     Lab Results   Component Value Date    LDL 81 05/02/2017    LDL 75 12/08/2016     HDL Cholesterol   Date Value Ref Range Status   12/08/2016 51 >39 mg/dL Final   ]  GFR Estimate   Date Value Ref Range Status   05/02/2017 87 >60 mL/min/1.7m2 Final     Comment:     Non  GFR Calc     GFR Estimate If Black   Date Value Ref Range Status   05/02/2017 >90   GFR Calc   >60 mL/min/1.7m2 Final     Lab Results   Component Value Date    CR 0.87 05/02/2017     No results found for: MICROALBUMIN    Socio/Economic Considerations:    Support system: spouse/significant other    Health Beliefs and Attitudes:   Patient Activation Measure Survey Score:  JENNIFER Score (Last Two) 7/15/2013   JENNIFER Raw Score 39   Activation Score 56.4   JENNIFER Level 3       Stage of Change: ACTION (Actively working towards change)      Diabetes knowledge and skills assessment:     Patient is knowledgeable in diabetes management concepts related to: Healthy Eating    Patient needs further education on the following diabetes management concepts: Healthy Eating, Being Active, Taking Medication, Reducing Risks and Healthy Coping    Barriers to Learning Assessment: No Barriers identified    Based on learning assessment above, most appropriate setting for further diabetes education would be: Group class or Individual " setting.    INTERVENTION:   Education provided today on:  AADE Self-Care Behaviors:  Healthy Eating: carbohydrate counting, portion control and label reading  Being Active: relationship to blood glucose  Taking Medication: side effects of prescribed medications  Reducing Risks: A1C - goals, relating to blood glucose levels, how often to check    Opportunities for ongoing education and support in diabetes-self management were discussed.    Pt verbalized understanding of concepts discussed and recommendations provided today.       Education Materials Provided:  [facility] Understanding Diabetes Booklet    PLAN:  Meal Plan Recommendation: use portion control  Exercise / activity plan: 30 minutes or more daily activity  A1c recheck in 3-6 months    FOLLOW-UP:  Chart routed to referring provider.  Follow up as needed. Patient informed of Medicare hours available first year.     Ongoing plan for education and support: Written resources (magazines, books, etc.) and Follow-up with primary care provider    DWIGHT Rachel RDN, MELINDAE    Time Spent: 60 minutes  Encounter Type: Individual    Any diabetes medication dose changes were made via the CDE Protocol and Collaborative Practice Agreement with the patient's primary care provider. A copy of this encounter was shared with the provider.

## 2017-06-14 ENCOUNTER — TELEPHONE (OUTPATIENT)
Dept: FAMILY MEDICINE | Facility: OTHER | Age: 72
End: 2017-06-14

## 2017-06-14 DIAGNOSIS — I10 ESSENTIAL HYPERTENSION WITH GOAL BLOOD PRESSURE LESS THAN 140/90: ICD-10-CM

## 2017-06-14 DIAGNOSIS — E78.5 HYPERLIPIDEMIA LDL GOAL <100: Primary | ICD-10-CM

## 2017-06-14 DIAGNOSIS — E66.01 MORBID OBESITY WITH BMI OF 45.0-49.9, ADULT (H): ICD-10-CM

## 2017-06-14 NOTE — TELEPHONE ENCOUNTER
I talked to this patient and informed him that Onel Malone PA-C stated he recommended and ultrasound to assess the risk for AAA, per Onel Malone PA-C.  The patient has questions why he needs this test.  He stated he does not want a phone call until after 1:30 in the afternoon tomorrow.

## 2017-06-14 NOTE — TELEPHONE ENCOUNTER
Please call the patient and advise that they should have an ultrasound to assess their risk for AAA.  Please help them arrange.  Electronically signed:    Onel Malone PA-C

## 2017-06-27 ENCOUNTER — HOSPITAL ENCOUNTER (OUTPATIENT)
Dept: ULTRASOUND IMAGING | Facility: CLINIC | Age: 72
Discharge: HOME | End: 2017-06-27
Attending: PHYSICIAN ASSISTANT | Admitting: PHYSICIAN ASSISTANT
Payer: MEDICARE

## 2017-06-27 DIAGNOSIS — E78.5 HYPERLIPIDEMIA LDL GOAL <100: ICD-10-CM

## 2017-06-27 DIAGNOSIS — I10 ESSENTIAL HYPERTENSION WITH GOAL BLOOD PRESSURE LESS THAN 140/90: ICD-10-CM

## 2017-06-27 DIAGNOSIS — E66.01 MORBID OBESITY WITH BMI OF 45.0-49.9, ADULT (H): ICD-10-CM

## 2017-06-27 PROCEDURE — 76775 US EXAM ABDO BACK WALL LIM: CPT

## 2017-06-28 ENCOUNTER — OFFICE VISIT (OUTPATIENT)
Dept: FAMILY MEDICINE | Facility: OTHER | Age: 72
End: 2017-06-28
Payer: COMMERCIAL

## 2017-06-28 VITALS
HEART RATE: 76 BPM | RESPIRATION RATE: 20 BRPM | OXYGEN SATURATION: 99 % | SYSTOLIC BLOOD PRESSURE: 156 MMHG | TEMPERATURE: 96.5 F | WEIGHT: 283.5 LBS | BODY MASS INDEX: 43.11 KG/M2 | DIASTOLIC BLOOD PRESSURE: 90 MMHG

## 2017-06-28 DIAGNOSIS — I10 ESSENTIAL HYPERTENSION WITH GOAL BLOOD PRESSURE LESS THAN 140/90: Primary | ICD-10-CM

## 2017-06-28 PROCEDURE — 99214 OFFICE O/P EST MOD 30 MIN: CPT | Performed by: PHYSICIAN ASSISTANT

## 2017-06-28 RX ORDER — CLONIDINE HYDROCHLORIDE 0.1 MG/1
TABLET ORAL
Qty: 120 TABLET | Refills: 1 | Status: CANCELLED | OUTPATIENT
Start: 2017-06-28

## 2017-06-28 RX ORDER — METOPROLOL SUCCINATE 25 MG/1
100 TABLET, EXTENDED RELEASE ORAL DAILY
Qty: 180 TABLET | Refills: 1 | Status: SHIPPED | OUTPATIENT
Start: 2017-06-28 | End: 2017-08-07

## 2017-06-28 ASSESSMENT — PAIN SCALES - GENERAL: PAINLEVEL: SEVERE PAIN (6)

## 2017-06-28 NOTE — NURSING NOTE
"Chief Complaint   Patient presents with     Hypertension       Initial /90 (BP Location: Left arm, Patient Position: Chair, Cuff Size: Adult Large)  Pulse 76  Temp 96.5  F (35.8  C) (Oral)  Resp 20  Wt 283 lb 8 oz (128.6 kg)  SpO2 99%  BMI 43.11 kg/m2 Estimated body mass index is 43.11 kg/(m^2) as calculated from the following:    Height as of 3/29/17: 5' 8\" (1.727 m).    Weight as of this encounter: 283 lb 8 oz (128.6 kg).  Medication Reconciliation: complete     Zuleima FUENTES LPN      "

## 2017-06-28 NOTE — PROGRESS NOTES
SUBJECTIVE:                                                    Ben Ceron is a 71 year old male who presents to clinic today for the following health issues:      Hypertension Follow-up      Outpatient blood pressures are being checked at home.  Results are similar to what we see today..    Low Salt Diet: no added salt      Amount of exercise or physical activity: 2-3 days/week for an average of less than 15 minutes    Problems taking medications regularly: No    Medication side effects: muscle aches to Norvasc    Diet: low salt, low fat/cholesterol and diabetic    Problem list and histories reviewed & adjusted, as indicated.  Additional history: as documented    Patient Active Problem List   Diagnosis     Advance Care Planning     Essential hypertension with goal blood pressure less than 140/90     Morbid obesity with BMI of 45.0-49.9, adult (H)     Family history of colon cancer     Rash     LUCAS (obstructive sleep apnea)     Benign non-nodular prostatic hyperplasia, presence of lower urinary tract symptoms unspecified     Primary osteoarthritis of left knee     Hyperlipidemia LDL goal <100     Past Surgical History:   Procedure Laterality Date     ARTHROSCOPY KNEE WITH MEDIAL MENISCECTOMY  6/6/2014    Procedure: ARTHROSCOPY KNEE WITH MEDIAL MENISCECTOMY;  Surgeon: Ramana Dominguez DO;  Location: [facility]     COLONOSCOPY  11/29/2011    Polypectomy.     COLONOSCOPY N/A 11/12/2014    Procedure: COLONOSCOPY;  Surgeon: Brooks Burris MD;  Location: [facility] GI     HC COLONOSCOPY W BIOPSY  11/24/08     HC COLONOSCOPY W/WO BRUSH/WASH  11/21/2005    Polypectomy.     HC VASECTOMY UNILAT/BILAT W POSTOP SEMEN  1977    Vasectomy       Social History   Substance Use Topics     Smoking status: Never Smoker     Smokeless tobacco: Never Used     Alcohol use 0.0 oz/week     0 Standard drinks or equivalent per week      Comment: occasional     Family History   Problem Relation Age of Onset     CANCER Mother      Lung Cancer      Cancer - colorectal Father      Stomach Cancer     CANCER Brother            Reviewed and updated as needed this visit by clinical staff  Tobacco  Allergies  Med Hx  Surg Hx  Fam Hx  Soc Hx      Reviewed and updated as needed this visit by Provider         ROS:  Constitutional, HEENT, cardiovascular, pulmonary, gi and gu systems are negative, except as otherwise noted.    OBJECTIVE:     /90 (BP Location: Left arm, Patient Position: Chair, Cuff Size: Adult Large)  Pulse 76  Temp 96.5  F (35.8  C) (Oral)  Resp 20  Wt 283 lb 8 oz (128.6 kg)  SpO2 99%  BMI 43.11 kg/m2  Body mass index is 43.11 kg/(m^2).  GENERAL: healthy, alert and no distress  RESP: lungs clear to auscultation - no rales, rhonchi or wheezes  CV: regular rate and rhythm, normal S1 S2, no S3 or S4, no murmur, click or rub, no peripheral edema and peripheral pulses strong  MS: no gross musculoskeletal defects noted, no edema    Diagnostic Test Results:  Essentially normal recent ECHO and AAA screening imaging noted.    ASSESSMENT/PLAN:     1. Essential hypertension with goal blood pressure less than 140/90  Will have him incrementally increase his Toprol and ROV 4-6 weeks.  Review by cardiology is requested.  - CARDIOLOGY EVAL ADULT REFERRAL  - metoprolol (TOPROL-XL) 25 MG 24 hr tablet; Take 4 tablets (100 mg) by mouth daily  Dispense: 180 tablet; Refill: 1    CONSULTATION/REFERRAL to cardiology to help with HTN optimization.    Onel Malone PA-C  [facility]

## 2017-06-28 NOTE — MR AVS SNAPSHOT
After Visit Summary   6/28/2017    Ben Ceron    MRN: [id number]           Patient Information     Date Of Birth          1945        Visit Information        Provider Department      6/28/2017 9:20 AM Onel Green PA-C [facility]        Today's Diagnoses     Essential hypertension with goal blood pressure less than 140/90    -  1       Follow-ups after your visit        Additional Services     CARDIOLOGY EVAL ADULT REFERRAL       Your provider has referred you to:  [facility]: [facility] [phone]   [email or web address]    Please be aware that coverage of these services is subject to the terms and limitations of your health insurance plan.  Call member services at your health plan with any benefit or coverage questions.      Type of Referral:  Optimize blood pressure    Timeframe requested:  Within 1 month    Please bring the following to your appointment:  >>   Any x-rays, CTs or MRIs which have been performed.  Contact the facility where they were done to arrange for  prior to your scheduled appointment.    >>   List of current medications  >>   This referral request   >>   Any documents/labs given to you for this referral                  Who to contact     If you have questions or need follow up information about today's clinic visit or your schedule please contact [facility] directly at [phone].  Normal or non-critical lab and imaging results will be communicated to you by MyChart, letter or phone within 4 business days after the clinic has received the results. If you do not hear from us within 7 days, please contact the clinic through MyChart or phone. If you have a critical or abnormal lab result, we will notify you by phone as soon as possible.  Submit refill requests through [employer] or call your pharmacy and they will forward the refill request to us.  "Please allow 3 business days for your refill to be completed.          Additional Information About Your Visit        MyChart Information     [employer] lets you send messages to your doctor, view your test results, renew your prescriptions, schedule appointments and more. To sign up, go to [email or web address] . Click on \"Log in\" on the left side of the screen, which will take you to the Welcome page. Then click on \"Sign up Now\" on the right side of the page.     You will be asked to enter the access code listed below, as well as some personal information. Please follow the directions to create your username and password.     Your access code is: [email or web address]  Expires: 2017  9:52 AM     Your access code will  in 90 days. If you need help or a new code, please call your [facility] clinic or [phone].        Care EveryWhere ID     This is your Care EveryWhere ID. This could be used by other organizations to access your [facility] medical records  [id number]        Your Vitals Were     Pulse Temperature Respirations Pulse Oximetry BMI (Body Mass Index)       76 96.5  F (35.8  C) (Oral) 20 99% 43.11 kg/m2        Blood Pressure from Last 3 Encounters:   17 156/90   17 150/80   17 154/80    Weight from Last 3 Encounters:   17 283 lb 8 oz (128.6 kg)   17 296 lb 12.8 oz (134.6 kg)   17 293 lb 14.4 oz (133.3 kg)              We Performed the Following     CARDIOLOGY EVAL ADULT REFERRAL          Today's Medication Changes          These changes are accurate as of: 17  9:52 AM.  If you have any questions, ask your nurse or doctor.               These medicines have changed or have updated prescriptions.        Dose/Directions    metoprolol 25 MG 24 hr tablet   Commonly known as:  TOPROL-XL   This may have changed:  how much to take   Used for:  Essential hypertension with goal blood pressure less than 140/90   Changed by:  Onel Green PA-C        Dose:  100 mg   Take 4 " tablets (100 mg) by mouth daily   Quantity:  180 tablet   Refills:  1         Stop taking these medicines if you haven't already. Please contact your care team if you have questions.     amLODIPine 10 MG tablet   Commonly known as:  NORVASC   Stopped by:  Onel Green PA-C                Where to get your medicines      These medications were sent to [facility] [address] [place] [address] [address]  [address], [facility] [address]     Phone:  [phone]     metoprolol 25 MG 24 hr tablet                Primary Care Provider Office Phone # Fax #    Onel Green PA-C [phone]       [facility] [address] [facility] [address]        Equal Access to Services     REMY SANTAMARIA : Hadii princess stein hadasho Soomaali, waaxda luqadaha, qaybta kaalmada adeegyada, waxwiley maier . So [facility] [phone].    ATENCIÓN: Si habla español, tiene a morales disposición servicios gratuitos de asistencia lingüística. [facility] [phone].    We comply with applicable federal civil rights laws and [place] laws. We do not discriminate on the basis of race, color, national origin, age, disability sex, sexual orientation or gender identity.            Thank you!     Thank you for choosing [facility]  for your care. Our goal is always to provide you with excellent care. Hearing back from our patients is one way we can continue to improve our services. Please take a few minutes to complete the written survey that you may receive in the mail after your visit with us. Thank you!             Your Updated Medication List - Protect others around you: Learn how to safely use, store and throw away your medicines at www.disposemymeds.org.          This list is accurate as of: 6/28/17  9:52 AM.  Always use your most recent med list.                   Brand Name Dispense Instructions for use Diagnosis    ADVIL 200 MG tablet   Generic drug:  ibuprofen     120    Reported on  5/2/2017        aspirin 81 MG tablet     30 tablet    Take by mouth daily        clobetasol 0.05 % cream    TEMOVATE    60 g    Small amount to affected areas BID    Yeast dermatitis       desoximetasone 0.25 % cream    TOPICORT    100 g    use as directed to legs    Rash       ezetimibe 10 MG tablet    ZETIA    30 tablet    Take 1 tablet (10 mg) by mouth daily    Hyperlipidemia LDL goal <130       finasteride 5 MG tablet    PROSCAR    90 tablet    Take 1 tablet (5 mg) by mouth daily    Benign non-nodular prostatic hyperplasia, presence of lower urinary tract symptoms unspecified       losartan-hydrochlorothiazide 100-25 MG per tablet    HYZAAR    90 tablet    Take 1 tablet by mouth daily    Essential hypertension with goal blood pressure less than 140/90       metFORMIN 500 MG tablet    GLUCOPHAGE    180 tablet    Take 1 tablet (500 mg) by mouth 2 times daily (with meals)    Controlled type 2 diabetes mellitus without complication, without long-term current use of insulin (H)       metoprolol 25 MG 24 hr tablet    TOPROL-XL    180 tablet    Take 4 tablets (100 mg) by mouth daily    Essential hypertension with goal blood pressure less than 140/90       order for DME     1 Units    Equipment being ordered: CPAP and related hardware, mask and tubing as well heated humidity equipment.    LUCAS (obstructive sleep apnea), Morbid obesity with BMI of 45.0-49.9, adult (H)       TYLENOL PO      As needed        vitamin D 2000 UNITS tablet      Take 1 tablet by mouth daily.        vitamin E 400 UNIT capsule

## 2017-07-31 DIAGNOSIS — I10 ESSENTIAL HYPERTENSION WITH GOAL BLOOD PRESSURE LESS THAN 140/90: ICD-10-CM

## 2017-07-31 NOTE — TELEPHONE ENCOUNTER
Toprol      Last Written Prescription Date: 6-  Last Fill Quantity: 180, # refills: 1    Last Office Visit with G, P or [facility] prescribing provider:  6-   Future Office Visit:        BP Readings from Last 3 Encounters:   06/28/17 156/90   05/02/17 150/80   05/01/17 154/80     Pharmacy note states Insurance will not cover 4 tablets daily--may need to switch to 100 mg

## 2017-08-01 RX ORDER — METOPROLOL SUCCINATE 100 MG/1
100 TABLET, EXTENDED RELEASE ORAL DAILY
Qty: 90 TABLET | Refills: 1 | Status: SHIPPED | OUTPATIENT
Start: 2017-08-01 | End: 2017-09-18

## 2017-08-01 NOTE — TELEPHONE ENCOUNTER
Routing refill request to provider for review/approval because:  Note from pharmacy: insurance will not cover 4 tablets/day. 100 mg dose pended. Will need provider to review.     Meena Carr RN  [facility]

## 2017-08-07 ENCOUNTER — OFFICE VISIT (OUTPATIENT)
Dept: CARDIOLOGY | Facility: CLINIC | Age: 72
End: 2017-08-07
Payer: COMMERCIAL

## 2017-08-07 VITALS
WEIGHT: 289.5 LBS | OXYGEN SATURATION: 98 % | HEART RATE: 62 BPM | BODY MASS INDEX: 43.87 KG/M2 | SYSTOLIC BLOOD PRESSURE: 156 MMHG | DIASTOLIC BLOOD PRESSURE: 90 MMHG | HEIGHT: 68 IN

## 2017-08-07 DIAGNOSIS — I10 BENIGN ESSENTIAL HYPERTENSION: Primary | ICD-10-CM

## 2017-08-07 PROCEDURE — 99204 OFFICE O/P NEW MOD 45 MIN: CPT | Performed by: INTERNAL MEDICINE

## 2017-08-07 RX ORDER — NIFEDIPINE 30 MG
30 TABLET, EXTENDED RELEASE ORAL DAILY
Qty: 30 TABLET | Refills: 3 | Status: SHIPPED | OUTPATIENT
Start: 2017-08-07 | End: 2017-09-18

## 2017-08-07 NOTE — LETTER
8/7/2017      RE: Ben Ceron  [address] [facility] [address]       Dear Colleague,    Thank you for the opportunity to participate in the care of your patient, Ben Ceron, at the [facility]. Please see a copy of my visit note below.    HISTORY:    Ben Ceron is a 71-year-old gentleman who works as an x-ray tech, semiretired, and accompanied by his wife today. He was asked to see me for assistance in management of hypertension. He has a history of morbid obesity, hyperlipidemia, and obstructive sleep apnea.    Ben reports that he has had elevated blood pressure for at least 15 years. Over the last few years it has been more difficult to control and multiple medications have been used. He was intolerant of lisinopril which caused a cough, and was put on losartan. The dose on this was increased and her chlorothiazide was added. He was also tried on Norvasc which caused him some hypotension, but more substantial symptoms of severe muscle aches ensued.    A recent stress echo showed no evidence of inducible ischemia. His resting ECG is normal. A recent abdominal ultrasound showed a normal size aorta.  TSH is normal as is renal function. The patient was recently diagnosed with borderline diabetes, hemoglobin A1c 6.6 and glucose typically greater than 100 over the last couple of years. He was started on Glucophage.    The patient denies any history of exertional chest, arm, neck, or jaw discomfort. He has been trying to exercise more in the recent past and finds himself to be easily fatigued but as he exercises his stamina has increased. He has not had problems with palpitations, claudication, peripheral edema, PND/orthopnea, syncope or near-syncope, or strokelike symptoms. He states that he eats a low-salt diet and his wife confirms that she cooks most of their food from scratch and does not add salt. He has not had problems with anxiety fevers  or chills or night sweats.      ASSESSMENT/PLAN:    1.  Essential hypertension. The patient is currently using metoprolol 100 mg per day. The addition of this medicine did not really have any effect on his blood pressure so I suspect it is not effective. He is also on losartan hydrochlorothiazide 100-25. Historically, Norvasc seem to be the best medication use so far for blood pressure control but had other intolerable side effects. I will initiate Procardia XL at a dose of 30 mg per day and I would plan on titrating it as needed. If this is as effective as Norvasc, I would plan on stopping the beta blocker eventually and may be even consider stopping the Hyzaar.  Given his age and lack of symptoms, as well as the long-standing history of hypertension, don't think further workup of his hypertension is indicated. I spoke to him extensively about nonpharmacologic control of hypertension including some limitation of salt intake and regular exercise/weight loss.   2. Hyperlipidemia. Well controlled on Zetia at 10 mg per day. Continue same for now, although eventually I would consider switching him over to a statin particularly in light of his recently diagnosed diabetes.        Orders Placed This Encounter   Procedures     Follow-Up with Cardiologist     Orders Placed This Encounter   Medications     NIFEdipine ER osmotic (ADALAT CC) 30 MG TB24     Sig: Take 1 tablet (30 mg) by mouth daily     Dispense:  30 tablet     Refill:  3     Medications Discontinued During This Encounter   Medication Reason     metoprolol (TOPROL-XL) 25 MG 24 hr tablet Medication Reconciliation Clean Up         Encounter Diagnosis   Name Primary?     Benign essential hypertension Yes       CURRENT MEDICATIONS:  Current Outpatient Prescriptions   Medication Sig Dispense Refill     NIFEdipine ER osmotic (ADALAT CC) 30 MG TB24 Take 1 tablet (30 mg) by mouth daily 30 tablet 3     metoprolol (TOPROL-XL) 100 MG 24 hr tablet Take 1 tablet (100 mg) by  mouth daily 90 tablet 1     losartan-hydrochlorothiazide (HYZAAR) 100-25 MG per tablet TAKE ONE TABLET BY MOUTH ONCE DAILY 90 tablet 0     metFORMIN (GLUCOPHAGE) 500 MG tablet Take 1 tablet (500 mg) by mouth 2 times daily (with meals) 180 tablet 1     Acetaminophen (TYLENOL PO) As needed       clobetasol (TEMOVATE) 0.05 % cream Small amount to affected areas BID 60 g 1     finasteride (PROSCAR) 5 MG tablet Take 1 tablet (5 mg) by mouth daily 90 tablet 1     desoximetasone (TOPICORT) 0.25 % cream use as directed to legs 100 g 1     aspirin 81 MG tablet Take by mouth daily 30 tablet      ezetimibe (ZETIA) 10 MG tablet Take 1 tablet (10 mg) by mouth daily 30 tablet 6     Cholecalciferol (VITAMIN D) 2000 UNITS tablet Take 1 tablet by mouth daily.       ADVIL 200 MG OR TABS Reported on 5/2/2017 120 0     VITAMIN E 400 UNIT OR CAPS   0     [DISCONTINUED] metoprolol (TOPROL-XL) 25 MG 24 hr tablet Take 4 tablets (100 mg) by mouth daily 180 tablet 1     order for DME Equipment being ordered: CPAP and related hardware, mask and tubing as well heated humidity equipment. 1 Units 0       ALLERGIES     Allergies   Allergen Reactions     Norvasc [Amlodipine] Muscle Pain (Myalgia)     Simvastatin Rash     Rash and edema       PAST MEDICAL HISTORY:  Past Medical History:   Diagnosis Date     Benign non-nodular prostatic hyperplasia, presence of lower urinary tract symptoms unspecified 5/31/2016     Colonic polyps      Family history of colon cancer 4/4/2016    brother with colonoscopy      Hyperlipidemia LDL goal <100 5/3/2017     Hyperlipidemia LDL goal <130 12/8/2016     LUCAS (obstructive sleep apnea) 4/4/2016     Rash 4/4/2016       PAST SURGICAL HISTORY:  Past Surgical History:   Procedure Laterality Date     ARTHROSCOPY KNEE WITH MEDIAL MENISCECTOMY  6/6/2014    Procedure: ARTHROSCOPY KNEE WITH MEDIAL MENISCECTOMY;  Surgeon: Ramana Dominguez DO;  Location: [facility] OR     COLONOSCOPY  11/29/2011    Polypectomy.      COLONOSCOPY N/A 11/12/2014    Procedure: COLONOSCOPY;  Surgeon: Brooks Burris MD;  Location: [facility] GI     HC COLONOSCOPY W BIOPSY  11/24/08     HC COLONOSCOPY W/WO BRUSH/WASH  11/21/2005    Polypectomy.     HC VASECTOMY UNILAT/BILAT W POSTOP SEMEN  1977    Vasectomy       FAMILY HISTORY:  Family History   Problem Relation Age of Onset     CANCER Mother      Lung Cancer     Cancer - colorectal Father      Stomach Cancer     CANCER Brother        SOCIAL HISTORY:  Social History     Social History     Marital status:      Spouse name: Bia     Number of children: 2     Years of education: 16     Occupational History     [employer]     Social History Main Topics     Smoking status: Never Smoker     Smokeless tobacco: Never Used     Alcohol use 0.0 oz/week     0 Standard drinks or equivalent per week      Comment: occasional     Drug use: No     Sexual activity: Yes     Partners: Female     Other Topics Concern      Service No     Blood Transfusions No     Caffeine Concern No     Occupational Exposure Yes     Radiation,     Hobby Hazards Yes     hunting     Sleep Concern Yes     CPAP     Stress Concern Yes     job     Weight Concern Yes     would like to lose      Special Diet No     Back Care No     Exercise No     Bike Helmet No     Seat Belt Yes     Self-Exams Yes     occassional     Parent/Sibling W/ Cabg, Mi Or Angioplasty Before 65f 55m? No     Social History Narrative       Review of Systems:  Skin:  Negative     Eyes:  Positive for glasses  ENT:  Negative    Respiratory:  Positive for dyspnea on exertion;sleep apnea  Cardiovascular:  Negative for;palpitations;chest pain;edema;lightheadedness;dizziness;fatigue    Gastroenterology: Negative    Genitourinary:  Negative    Musculoskeletal:  Positive for arthritis  Neurologic:  Negative    Psychiatric:  Negative    Heme/Lymph/Imm:  Positive for allergies  Endocrine:  Negative      Physical Exam:  Vitals: /90 (BP  "Location: Right arm, Patient Position: Fowlers, Cuff Size: Adult Large)  Pulse 62  Ht 1.727 m (5' 8\")  Wt 131.3 kg (289 lb 8 oz)  SpO2 98%  BMI 44.02 kg/m2    Constitutional:  cooperative, alert and oriented, well developed, well nourished, in no acute distress morbidly obese      Skin:  warm and dry to the touch        Head:  normocephalic        Eyes:  no xanthalasma        ENT:  no pallor or cyanosis        Neck:  carotid pulses are full and equal bilaterally, JVP normal, no carotid bruit, no thyromegaly        Chest:  normal breath sounds, clear to auscultation, normal A-P diameter, normal symmetry, normal respiratory excursion, no use of accessory muscles        Cardiac: regular rhythm, normal S1/S2, no S3 or S4, apical impulse not displaced, no murmurs, gallops or rubs   distant heart sounds              Abdomen:  abdomen soft, non-tender, BS normoactive, no mass, no HSM, no bruits        Vascular: pulses full and equal                                      Extremities and Back:  no edema        Neurological:  no gross motor deficits;affect appropriate, oriented to time, person and place          Recent Lab Results:  LIPID RESULTS:  Lab Results   Component Value Date    CHOL 163 12/08/2016    HDL 51 12/08/2016    LDL 81 05/02/2017    LDL 75 12/08/2016    TRIG 185 (H) 12/08/2016    CHOLHDLRATIO 5.0 11/03/2014       LIVER ENZYME RESULTS:  Lab Results   Component Value Date    AST 12 05/02/2017    ALT 28 05/02/2017       CBC RESULTS:  Lab Results   Component Value Date    WBC 12.0 (H) 05/02/2017    RBC 4.74 05/02/2017    HGB 15.0 05/02/2017    HCT 42.3 05/02/2017    MCV 89 05/02/2017    MCH 31.6 05/02/2017    MCHC 35.5 05/02/2017    RDW 11.9 05/02/2017     05/02/2017       BMP RESULTS:  Lab Results   Component Value Date     05/02/2017    POTASSIUM 3.7 05/02/2017    CHLORIDE 102 05/02/2017    CO2 24 05/02/2017    ANIONGAP 13 05/02/2017     (H) 05/02/2017    BUN 24 05/02/2017    CR 0.87 " 05/02/2017    GFRESTIMATED 87 05/02/2017    GFRESTBLACK >90   GFR Calc   05/02/2017    ALLEN 8.9 05/02/2017        A1C RESULTS:  Lab Results   Component Value Date    A1C 6.6 (H) 05/03/2017       INR RESULTS:  No results found for: INR      Brad Mcginnis MD, FACC    CC  Onel Green PA-C  [facility]  [address] [place] [address]

## 2017-08-07 NOTE — MR AVS SNAPSHOT
"              After Visit Summary   8/7/2017    Ben Ceron    MRN: [id number]           Patient Information     Date Of Birth          1945        Visit Information        Provider Department      8/7/2017 1:00 PM Brad Mcginnis MD [facility]        Today's Diagnoses     Benign essential hypertension    -  1       Follow-ups after your visit        Additional Services     Follow-Up with Cardiologist                 Future tests that were ordered for you today     Open Future Orders        Priority Expected Expires Ordered    Follow-Up with Cardiologist Routine 9/6/2017 8/7/2018 8/7/2017            Who to contact     If you have questions or need follow up information about today's clinic visit or your schedule please contact [facility] directly at [phone].  Normal or non-critical lab and imaging results will be communicated to you by sickweatherhart, letter or phone within 4 business days after the clinic has received the results. If you do not hear from us within 7 days, please contact the clinic through sickweatherhart or phone. If you have a critical or abnormal lab result, we will notify you by phone as soon as possible.  Submit refill requests through [employer] or call your pharmacy and they will forward the refill request to us. Please allow 3 business days for your refill to be completed.          Additional Information About Your Visit        MyChart Information     [employer] lets you send messages to your doctor, view your test results, renew your prescriptions, schedule appointments and more. To sign up, go to [email or web address] . Click on \"Log in\" on the left side of the screen, which will take you to the Welcome page. Then click on \"Sign up Now\" on the right side of the page.     You will be asked to enter the access code listed below, as well as some personal information. Please follow the directions to create your username and password.     Your access code is: " [email or web address]  Expires: 2017  9:52 AM     Your access code will  in 90 days. If you need help or a new code, please call your [facility] clinic or [phone].        Care EveryWhere ID     This is your Care EveryWhere ID. This could be used by other organizations to access your [facility] medical records  [id number]        Your Vitals Were     Pulse Height Pulse Oximetry BMI (Body Mass Index)          62 1.727 m (5' 8\") 98% 44.02 kg/m2         Blood Pressure from Last 3 Encounters:   17 156/90   17 156/90   17 150/80    Weight from Last 3 Encounters:   17 131.3 kg (289 lb 8 oz)   17 128.6 kg (283 lb 8 oz)   17 134.6 kg (296 lb 12.8 oz)                 Today's Medication Changes          These changes are accurate as of: 17  2:01 PM.  If you have any questions, ask your nurse or doctor.               Start taking these medicines.        Dose/Directions    NIFEdipine ER 30 MG Tb24   Commonly known as:  ADALAT CC   Used for:  Benign essential hypertension        Dose:  30 mg   Take 1 tablet (30 mg) by mouth daily   Quantity:  30 tablet   Refills:  3            Where to get your medicines      These medications were sent to [facility] [address] [place] [address] [address]  [address] [facility] [address]     Phone:  [phone]     NIFEdipine ER 30 MG Tb24                Primary Care Provider Office Phone # Fax #    Onel Green PA-C [phone]       [facility] [address] [facility] [address]        Equal Access to Services     AYAAN SANTAMARIA AH: Hadii princess Elizabeth, waalfredoda luchristophadaha, qaybta kaalmanova george, blanche liz. So [facility] [phone].    ATENCIÓN: Si habla español, tiene a morales disposición servicios gratuitos de asistencia lingüística. Llame al [phone].    We comply with applicable federal civil rights laws and [place] laws. We do not discriminate on the basis of race, color, " national origin, age, disability sex, sexual orientation or gender identity.            Thank you!     Thank you for choosing [facility]  for your care. Our goal is always to provide you with excellent care. Hearing back from our patients is one way we can continue to improve our services. Please take a few minutes to complete the written survey that you may receive in the mail after your visit with us. Thank you!             Your Updated Medication List - Protect others around you: Learn how to safely use, store and throw away your medicines at www.disposemymeds.org.          This list is accurate as of: 8/7/17  2:01 PM.  Always use your most recent med list.                   Brand Name Dispense Instructions for use Diagnosis    ADVIL 200 MG tablet   Generic drug:  ibuprofen     120    Reported on 5/2/2017        aspirin 81 MG tablet     30 tablet    Take by mouth daily        clobetasol 0.05 % cream    TEMOVATE    60 g    Small amount to affected areas BID    Yeast dermatitis       desoximetasone 0.25 % cream    TOPICORT    100 g    use as directed to legs    Rash       ezetimibe 10 MG tablet    ZETIA    30 tablet    Take 1 tablet (10 mg) by mouth daily    Hyperlipidemia LDL goal <130       finasteride 5 MG tablet    PROSCAR    90 tablet    Take 1 tablet (5 mg) by mouth daily    Benign non-nodular prostatic hyperplasia, presence of lower urinary tract symptoms unspecified       losartan-hydrochlorothiazide 100-25 MG per tablet    HYZAAR    90 tablet    TAKE ONE TABLET BY MOUTH ONCE DAILY    Essential hypertension with goal blood pressure less than 140/90       metFORMIN 500 MG tablet    GLUCOPHAGE    180 tablet    Take 1 tablet (500 mg) by mouth 2 times daily (with meals)    Controlled type 2 diabetes mellitus without complication, without long-term current use of insulin (H)       metoprolol 100 MG 24 hr tablet    TOPROL-XL    90 tablet    Take 1 tablet (100 mg) by mouth daily    Essential  hypertension with goal blood pressure less than 140/90       NIFEdipine ER 30 MG Tb24    ADALAT CC    30 tablet    Take 1 tablet (30 mg) by mouth daily    Benign essential hypertension       order for DME     1 Units    Equipment being ordered: CPAP and related hardware, mask and tubing as well heated humidity equipment.    LUCAS (obstructive sleep apnea), Morbid obesity with BMI of 45.0-49.9, adult (H)       TYLENOL PO      As needed        vitamin D 2000 UNITS tablet      Take 1 tablet by mouth daily.        vitamin E 400 UNIT capsule

## 2017-08-07 NOTE — PROGRESS NOTES
HISTORY:    Ben Ceron is a 71-year-old gentleman who works as an x-ray tech, semiretired, and accompanied by his wife today. He was asked to see me for assistance in management of hypertension. He has a history of morbid obesity, hyperlipidemia, and obstructive sleep apnea.    Ben reports that he has had elevated blood pressure for at least 15 years. Over the last few years it has been more difficult to control and multiple medications have been used. He was intolerant of lisinopril which caused a cough, and was put on losartan. The dose on this was increased and her chlorothiazide was added. He was also tried on Norvasc which caused him some hypotension, but more substantial symptoms of severe muscle aches ensued.    A recent stress echo showed no evidence of inducible ischemia. His resting ECG is normal. A recent abdominal ultrasound showed a normal size aorta.  TSH is normal as is renal function. The patient was recently diagnosed with borderline diabetes, hemoglobin A1c 6.6 and glucose typically greater than 100 over the last couple of years. He was started on Glucophage.    The patient denies any history of exertional chest, arm, neck, or jaw discomfort. He has been trying to exercise more in the recent past and finds himself to be easily fatigued but as he exercises his stamina has increased. He has not had problems with palpitations, claudication, peripheral edema, PND/orthopnea, syncope or near-syncope, or strokelike symptoms. He states that he eats a low-salt diet and his wife confirms that she cooks most of their food from scratch and does not add salt. He has not had problems with anxiety fevers or chills or night sweats.      ASSESSMENT/PLAN:    1.  Essential hypertension. The patient is currently using metoprolol 100 mg per day. The addition of this medicine did not really have any effect on his blood pressure so I suspect it is not effective. He is also on losartan hydrochlorothiazide 100-25.  Historically, Norvasc seem to be the best medication use so far for blood pressure control but had other intolerable side effects. I will initiate Procardia XL at a dose of 30 mg per day and I would plan on titrating it as needed. If this is as effective as Norvasc, I would plan on stopping the beta blocker eventually and may be even consider stopping the Hyzaar.  Given his age and lack of symptoms, as well as the long-standing history of hypertension, don't think further workup of his hypertension is indicated. I spoke to him extensively about nonpharmacologic control of hypertension including some limitation of salt intake and regular exercise/weight loss.   2. Hyperlipidemia. Well controlled on Zetia at 10 mg per day. Continue same for now, although eventually I would consider switching him over to a statin particularly in light of his recently diagnosed diabetes.        Orders Placed This Encounter   Procedures     Follow-Up with Cardiologist     Orders Placed This Encounter   Medications     NIFEdipine ER osmotic (ADALAT CC) 30 MG TB24     Sig: Take 1 tablet (30 mg) by mouth daily     Dispense:  30 tablet     Refill:  3     Medications Discontinued During This Encounter   Medication Reason     metoprolol (TOPROL-XL) 25 MG 24 hr tablet Medication Reconciliation Clean Up         Encounter Diagnosis   Name Primary?     Benign essential hypertension Yes       CURRENT MEDICATIONS:  Current Outpatient Prescriptions   Medication Sig Dispense Refill     NIFEdipine ER osmotic (ADALAT CC) 30 MG TB24 Take 1 tablet (30 mg) by mouth daily 30 tablet 3     metoprolol (TOPROL-XL) 100 MG 24 hr tablet Take 1 tablet (100 mg) by mouth daily 90 tablet 1     losartan-hydrochlorothiazide (HYZAAR) 100-25 MG per tablet TAKE ONE TABLET BY MOUTH ONCE DAILY 90 tablet 0     metFORMIN (GLUCOPHAGE) 500 MG tablet Take 1 tablet (500 mg) by mouth 2 times daily (with meals) 180 tablet 1     Acetaminophen (TYLENOL PO) As needed       clobetasol  (TEMOVATE) 0.05 % cream Small amount to affected areas BID 60 g 1     finasteride (PROSCAR) 5 MG tablet Take 1 tablet (5 mg) by mouth daily 90 tablet 1     desoximetasone (TOPICORT) 0.25 % cream use as directed to legs 100 g 1     aspirin 81 MG tablet Take by mouth daily 30 tablet      ezetimibe (ZETIA) 10 MG tablet Take 1 tablet (10 mg) by mouth daily 30 tablet 6     Cholecalciferol (VITAMIN D) 2000 UNITS tablet Take 1 tablet by mouth daily.       ADVIL 200 MG OR TABS Reported on 5/2/2017 120 0     VITAMIN E 400 UNIT OR CAPS   0     [DISCONTINUED] metoprolol (TOPROL-XL) 25 MG 24 hr tablet Take 4 tablets (100 mg) by mouth daily 180 tablet 1     order for DME Equipment being ordered: CPAP and related hardware, mask and tubing as well heated humidity equipment. 1 Units 0       ALLERGIES     Allergies   Allergen Reactions     Norvasc [Amlodipine] Muscle Pain (Myalgia)     Simvastatin Rash     Rash and edema       PAST MEDICAL HISTORY:  Past Medical History:   Diagnosis Date     Benign non-nodular prostatic hyperplasia, presence of lower urinary tract symptoms unspecified 5/31/2016     Colonic polyps      Family history of colon cancer 4/4/2016    brother with colonoscopy      Hyperlipidemia LDL goal <100 5/3/2017     Hyperlipidemia LDL goal <130 12/8/2016     LUCAS (obstructive sleep apnea) 4/4/2016     Rash 4/4/2016       PAST SURGICAL HISTORY:  Past Surgical History:   Procedure Laterality Date     ARTHROSCOPY KNEE WITH MEDIAL MENISCECTOMY  6/6/2014    Procedure: ARTHROSCOPY KNEE WITH MEDIAL MENISCECTOMY;  Surgeon: Ramana Dominguez DO;  Location: [facility]     COLONOSCOPY  11/29/2011    Polypectomy.     COLONOSCOPY N/A 11/12/2014    Procedure: COLONOSCOPY;  Surgeon: Brooks Burris MD;  Location:  GI     HC COLONOSCOPY W BIOPSY  11/24/08     HC COLONOSCOPY W/WO BRUSH/WASH  11/21/2005    Polypectomy.     HC VASECTOMY UNILAT/BILAT W POSTOP SEMEN  1977    Vasectomy       FAMILY HISTORY:  Family History   Problem  "Relation Age of Onset     CANCER Mother      Lung Cancer     Cancer - colorectal Father      Stomach Cancer     CANCER Brother        SOCIAL HISTORY:  Social History     Social History     Marital status:      Spouse name: Bia     Number of children: 2     Years of education: 16     Occupational History     Radiologic tech [employer]     Social History Main Topics     Smoking status: Never Smoker     Smokeless tobacco: Never Used     Alcohol use 0.0 oz/week     0 Standard drinks or equivalent per week      Comment: occasional     Drug use: No     Sexual activity: Yes     Partners: Female     Other Topics Concern      Service No     Blood Transfusions No     Caffeine Concern No     Occupational Exposure Yes     Radiation,     Hobby Hazards Yes     hunting     Sleep Concern Yes     CPAP     Stress Concern Yes     job     Weight Concern Yes     would like to lose      Special Diet No     Back Care No     Exercise No     Bike Helmet No     Seat Belt Yes     Self-Exams Yes     occassional     Parent/Sibling W/ Cabg, Mi Or Angioplasty Before 65f 55m? No     Social History Narrative       Review of Systems:  Skin:  Negative     Eyes:  Positive for glasses  ENT:  Negative    Respiratory:  Positive for dyspnea on exertion;sleep apnea  Cardiovascular:  Negative for;palpitations;chest pain;edema;lightheadedness;dizziness;fatigue    Gastroenterology: Negative    Genitourinary:  Negative    Musculoskeletal:  Positive for arthritis  Neurologic:  Negative    Psychiatric:  Negative    Heme/Lymph/Imm:  Positive for allergies  Endocrine:  Negative      Physical Exam:  Vitals: /90 (BP Location: Right arm, Patient Position: Fowlers, Cuff Size: Adult Large)  Pulse 62  Ht 1.727 m (5' 8\")  Wt 131.3 kg (289 lb 8 oz)  SpO2 98%  BMI 44.02 kg/m2    Constitutional:  cooperative, alert and oriented, well developed, well nourished, in no acute distress morbidly obese      Skin:  warm and dry to the touch "        Head:  normocephalic        Eyes:  no xanthalasma        ENT:  no pallor or cyanosis        Neck:  carotid pulses are full and equal bilaterally, JVP normal, no carotid bruit, no thyromegaly        Chest:  normal breath sounds, clear to auscultation, normal A-P diameter, normal symmetry, normal respiratory excursion, no use of accessory muscles        Cardiac: regular rhythm, normal S1/S2, no S3 or S4, apical impulse not displaced, no murmurs, gallops or rubs   distant heart sounds              Abdomen:  abdomen soft, non-tender, BS normoactive, no mass, no HSM, no bruits        Vascular: pulses full and equal                                      Extremities and Back:  no edema        Neurological:  no gross motor deficits;affect appropriate, oriented to time, person and place          Recent Lab Results:  LIPID RESULTS:  Lab Results   Component Value Date    CHOL 163 12/08/2016    HDL 51 12/08/2016    LDL 81 05/02/2017    LDL 75 12/08/2016    TRIG 185 (H) 12/08/2016    CHOLHDLRATIO 5.0 11/03/2014       LIVER ENZYME RESULTS:  Lab Results   Component Value Date    AST 12 05/02/2017    ALT 28 05/02/2017       CBC RESULTS:  Lab Results   Component Value Date    WBC 12.0 (H) 05/02/2017    RBC 4.74 05/02/2017    HGB 15.0 05/02/2017    HCT 42.3 05/02/2017    MCV 89 05/02/2017    MCH 31.6 05/02/2017    MCHC 35.5 05/02/2017    RDW 11.9 05/02/2017     05/02/2017       BMP RESULTS:  Lab Results   Component Value Date     05/02/2017    POTASSIUM 3.7 05/02/2017    CHLORIDE 102 05/02/2017    CO2 24 05/02/2017    ANIONGAP 13 05/02/2017     (H) 05/02/2017    BUN 24 05/02/2017    CR 0.87 05/02/2017    GFRESTIMATED 87 05/02/2017    GFRESTBLACK >90   GFR Calc   05/02/2017    ALLEN 8.9 05/02/2017        A1C RESULTS:  Lab Results   Component Value Date    A1C 6.6 (H) 05/03/2017       INR RESULTS:  No results found for: INR      Brad Mcginnis MD, FACC    CC  [facility]  [facility]  [address] [place] [address]

## 2017-09-18 ENCOUNTER — OFFICE VISIT (OUTPATIENT)
Dept: CARDIOLOGY | Facility: CLINIC | Age: 72
End: 2017-09-18
Payer: COMMERCIAL

## 2017-09-18 VITALS
WEIGHT: 286.7 LBS | DIASTOLIC BLOOD PRESSURE: 72 MMHG | HEIGHT: 68 IN | SYSTOLIC BLOOD PRESSURE: 140 MMHG | HEART RATE: 60 BPM | BODY MASS INDEX: 43.45 KG/M2 | OXYGEN SATURATION: 97 %

## 2017-09-18 DIAGNOSIS — I10 ESSENTIAL HYPERTENSION WITH GOAL BLOOD PRESSURE LESS THAN 140/90: ICD-10-CM

## 2017-09-18 DIAGNOSIS — I10 BENIGN ESSENTIAL HYPERTENSION: ICD-10-CM

## 2017-09-18 PROCEDURE — 99214 OFFICE O/P EST MOD 30 MIN: CPT | Performed by: INTERNAL MEDICINE

## 2017-09-18 RX ORDER — METOPROLOL SUCCINATE 100 MG/1
100 TABLET, EXTENDED RELEASE ORAL DAILY
Qty: 90 TABLET | Refills: 1 | Status: SHIPPED | OUTPATIENT
Start: 2017-09-18 | End: 2018-04-27

## 2017-09-18 NOTE — MR AVS SNAPSHOT
"              After Visit Summary   9/18/2017    Ben Ceron    MRN: [id number]           Patient Information     Date Of Birth          1945        Visit Information        Provider Department      9/18/2017 11:30 AM Brad Mcginnis MD [facility]        Today's Diagnoses     Benign essential hypertension        Essential hypertension with goal blood pressure less than 140/90           Follow-ups after your visit        Additional Services     Follow-Up with Cardiologist                 Future tests that were ordered for you today     Open Future Orders        Priority Expected Expires Ordered    Follow-Up with Cardiologist Routine 12/17/2017 9/18/2018 9/18/2017            Who to contact     If you have questions or need follow up information about today's clinic visit or your schedule please contact [facility] directly at [phone].  Normal or non-critical lab and imaging results will be communicated to you by MyChart, letter or phone within 4 business days after the clinic has received the results. If you do not hear from us within 7 days, please contact the clinic through MyChart or phone. If you have a critical or abnormal lab result, we will notify you by phone as soon as possible.  Submit refill requests through [employer] or call your pharmacy and they will forward the refill request to us. Please allow 3 business days for your refill to be completed.          Additional Information About Your Visit        MyChart Information     [employer] lets you send messages to your doctor, view your test results, renew your prescriptions, schedule appointments and more. To sign up, go to [email or web address] . Click on \"Log in\" on the left side of the screen, which will take you to the Welcome page. Then click on \"Sign up Now\" on the right side of the page.     You will be asked to enter the access code listed below, as well as some personal information. Please follow the " "directions to create your username and password.     Your access code is: [email or web address]  Expires: 2017  9:52 AM     Your access code will  in 90 days. If you need help or a new code, please call your [facility] clinic or [phone].        Care EveryWhere ID     This is your Care EveryWhere ID. This could be used by other organizations to access your [facility] medical records  [id number]        Your Vitals Were     Pulse Height Pulse Oximetry BMI (Body Mass Index)          60 1.727 m (5' 8\") 97% 43.59 kg/m2         Blood Pressure from Last 3 Encounters:   17 140/72   17 156/90   17 156/90    Weight from Last 3 Encounters:   17 130 kg (286 lb 11.2 oz)   17 131.3 kg (289 lb 8 oz)   17 128.6 kg (283 lb 8 oz)              We Performed the Following     Follow-Up with Cardiologist          Today's Medication Changes          These changes are accurate as of: 17 11:55 AM.  If you have any questions, ask your nurse or doctor.               These medicines have changed or have updated prescriptions.        Dose/Directions    NIFEdipine ER 60 MG 24 hr tablet   Commonly known as:  ADALAT CC   This may have changed:    - medication strength  - how much to take   Used for:  Benign essential hypertension        Dose:  60 mg   Take 1 tablet (60 mg) by mouth daily   Quantity:  90 tablet   Refills:  3            Where to get your medicines      These medications were sent to [facility] [address] [place] [address] [address]  [address] [facility] [address]     Phone:  [phone]     metoprolol 100 MG 24 hr tablet    NIFEdipine ER 60 MG 24 hr tablet                Primary Care Provider Office Phone # Fax #    Onel Green PA-C [phone]       [address] [facility] [address]        Equal Access to Services     REMY SANTAMARIA AH: Tyrell Elizabeth, waaxda luqadaha, qaybta kaalmanova george, blanche maier . So [facility] " [phone].    ATENCIÓN: Si jason sinha, tiene a morales disposición servicios gratuitos de asistencia lingüística. Aleyda christie [phone].    We comply with applicable federal civil rights laws and [place] laws. We do not discriminate on the basis of race, color, national origin, age, disability sex, sexual orientation or gender identity.            Thank you!     Thank you for choosing [facility]  for your care. Our goal is always to provide you with excellent care. Hearing back from our patients is one way we can continue to improve our services. Please take a few minutes to complete the written survey that you may receive in the mail after your visit with us. Thank you!             Your Updated Medication List - Protect others around you: Learn how to safely use, store and throw away your medicines at www.disposemymeds.org.          This list is accurate as of: 9/18/17 11:55 AM.  Always use your most recent med list.                   Brand Name Dispense Instructions for use Diagnosis    ADVIL 200 MG tablet   Generic drug:  ibuprofen     120    Reported on 5/2/2017        aspirin 81 MG tablet     30 tablet    Take by mouth daily        clobetasol 0.05 % cream    TEMOVATE    60 g    Small amount to affected areas BID    Yeast dermatitis       desoximetasone 0.25 % cream    TOPICORT    100 g    use as directed to legs    Rash       ezetimibe 10 MG tablet    ZETIA    30 tablet    Take 1 tablet (10 mg) by mouth daily    Hyperlipidemia LDL goal <130       finasteride 5 MG tablet    PROSCAR    90 tablet    Take 1 tablet (5 mg) by mouth daily    Benign non-nodular prostatic hyperplasia, presence of lower urinary tract symptoms unspecified       losartan-hydrochlorothiazide 100-25 MG per tablet    HYZAAR    90 tablet    TAKE ONE TABLET BY MOUTH ONCE DAILY    Essential hypertension with goal blood pressure less than 140/90       metFORMIN 500 MG tablet    GLUCOPHAGE    180 tablet    Take 1 tablet (500 mg)  by mouth 2 times daily (with meals)    Controlled type 2 diabetes mellitus without complication, without long-term current use of insulin (H)       metoprolol 100 MG 24 hr tablet    TOPROL-XL    90 tablet    Take 1 tablet (100 mg) by mouth daily    Essential hypertension with goal blood pressure less than 140/90       NIFEdipine ER 60 MG 24 hr tablet    ADALAT CC    90 tablet    Take 1 tablet (60 mg) by mouth daily    Benign essential hypertension       order for DME     1 Units    Equipment being ordered: CPAP and related hardware, mask and tubing as well heated humidity equipment.    LUCAS (obstructive sleep apnea), Morbid obesity with BMI of 45.0-49.9, adult (H)       TYLENOL PO      As needed        vitamin D 2000 UNITS tablet      Take 1 tablet by mouth daily.        vitamin E 400 UNIT capsule

## 2017-09-18 NOTE — PROGRESS NOTES
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 He has a history of morbid obesity, hyperlipidemia, obstructive sleep apnea, and recently diagnosed diabetes.    At our last visit we initiated Procardia dose of 30 mg per day. The patient had had good blood pressure results with Norvasc but had problems with muscle aches. He has had a cough from lisinopril. He is currently also on Toprol- mg a day and losartan hydrochlorothiazide 100-25 milligrams per day.    Once again Ben brought in his blood pressure readings which she has been doing regularly. He brought his machine along with him and it turns out that his machine is 20 points higher than ours. His blood pressure readings are significantly improved from our last visit but systolic pressures are typically in the 150s with a fair number in the 140s and occasional numbers in the 160s. He has not had any adverse side effects from his Procardia.    Ben also describes issues of these had the past with statins. He was on Lipitor for many years tolerating it well but then tried to switch over to simvastatin because it had gone generic at the time. He developed severe cellulitis of his lower extremities and then was put on Crestor was also cause cellulitis. He tried going back to Lipitor and it also cause cellulitis so he eventually was started on Zetia which is done a good job of controlling his cholesterol without side effects.    Mr. Ceron has not had any problems with chest pain and he had a normal nuclear stress test done recently. He also denies problems with palpitations, claudication, significant peripheral edema, PND/orthopnea, and syncope or near syncope.      ASSESSMENT/PLAN:    1.  Hypertension. The patient's blood pressure today in clinic is 140. His home but pressure measurements in generally been in the 150s, although his  home machine does not appear to be accurate. I asked him to obtain a new machine which hopefully will be more accurate, and suggested that he bring it in to the clinic to check calibration.. In the meantime I'll increase his Procardia to 60 mg per day. Ultimately I would hope to be able to stop or decrease his beta blocker, since it seemed to be the least effective of his medications terms of blood pressure lowering.  2. Hyperlipidemia. Ideally katina should be on a statin considering his new diagnosis of diabetes. His cholesterol is quite well-controlled with Zetia, but this agent does not have the data to support the degree of benefits we would expect to see with statins. The patient reports that his wife uses simvastatin 80's willing to give this a try. When he used several years ago he developed his lower extremity rash within a few doses and he'll let me know if he is able to take it for a few days without this and will switch him over. Obviously, if he once again develops cellulitis with use of statins, we will keep him on Zetia long-term.    Thank you for S me to participate in your patient's care. Please hesitate to call if there are questions or concerns about him. He'll continue to measure his blood pressure regularly and I'll plan on having him back in 3 months.    Orders Placed This Encounter   Procedures     Follow-Up with Cardiologist     Orders Placed This Encounter   Medications     NIFEdipine ER (ADALAT CC) 60 MG 24 hr tablet     Sig: Take 1 tablet (60 mg) by mouth daily     Dispense:  90 tablet     Refill:  3     metoprolol (TOPROL-XL) 100 MG 24 hr tablet     Sig: Take 1 tablet (100 mg) by mouth daily     Dispense:  90 tablet     Refill:  1     Medications Discontinued During This Encounter   Medication Reason     NIFEdipine ER osmotic (ADALAT CC) 30 MG TB24 Reorder     metoprolol (TOPROL-XL) 100 MG 24 hr tablet Reorder         Encounter Diagnoses   Name Primary?     Benign essential hypertension       Essential hypertension with goal blood pressure less than 140/90        CURRENT MEDICATIONS:  Current Outpatient Prescriptions   Medication Sig Dispense Refill     NIFEdipine ER (ADALAT CC) 60 MG 24 hr tablet Take 1 tablet (60 mg) by mouth daily 90 tablet 3     metoprolol (TOPROL-XL) 100 MG 24 hr tablet Take 1 tablet (100 mg) by mouth daily 90 tablet 1     losartan-hydrochlorothiazide (HYZAAR) 100-25 MG per tablet TAKE ONE TABLET BY MOUTH ONCE DAILY 90 tablet 0     metFORMIN (GLUCOPHAGE) 500 MG tablet Take 1 tablet (500 mg) by mouth 2 times daily (with meals) 180 tablet 1     Acetaminophen (TYLENOL PO) As needed       clobetasol (TEMOVATE) 0.05 % cream Small amount to affected areas BID 60 g 1     finasteride (PROSCAR) 5 MG tablet Take 1 tablet (5 mg) by mouth daily 90 tablet 1     order for DME Equipment being ordered: CPAP and related hardware, mask and tubing as well heated humidity equipment. 1 Units 0     desoximetasone (TOPICORT) 0.25 % cream use as directed to legs 100 g 1     aspirin 81 MG tablet Take by mouth daily 30 tablet      ezetimibe (ZETIA) 10 MG tablet Take 1 tablet (10 mg) by mouth daily 30 tablet 6     ADVIL 200 MG OR TABS Reported on 5/2/2017 120 0     VITAMIN E 400 UNIT OR CAPS   0     [DISCONTINUED] NIFEdipine ER osmotic (ADALAT CC) 30 MG TB24 Take 1 tablet (30 mg) by mouth daily 30 tablet 3     [DISCONTINUED] metoprolol (TOPROL-XL) 100 MG 24 hr tablet Take 1 tablet (100 mg) by mouth daily 90 tablet 1     Cholecalciferol (VITAMIN D) 2000 UNITS tablet Take 1 tablet by mouth daily.         ALLERGIES     Allergies   Allergen Reactions     Norvasc [Amlodipine] Muscle Pain (Myalgia)     Simvastatin Rash     Rash and edema       PAST MEDICAL HISTORY:  Past Medical History:   Diagnosis Date     Benign non-nodular prostatic hyperplasia, presence of lower urinary tract symptoms unspecified 5/31/2016     Colonic polyps      Family history of colon cancer 4/4/2016    brother with colonoscopy       Hyperlipidemia LDL goal <100 5/3/2017     Hyperlipidemia LDL goal <130 12/8/2016     LUCAS (obstructive sleep apnea) 4/4/2016     Rash 4/4/2016       PAST SURGICAL HISTORY:  Past Surgical History:   Procedure Laterality Date     ARTHROSCOPY KNEE WITH MEDIAL MENISCECTOMY  6/6/2014    Procedure: ARTHROSCOPY KNEE WITH MEDIAL MENISCECTOMY;  Surgeon: Ramana Dominguez DO;  [facility]     COLONOSCOPY  11/29/2011    Polypectomy.     COLONOSCOPY N/A 11/12/2014    Procedure: COLONOSCOPY;  Surgeon: Brooks Burris MD;  Location: [facility]     HC COLONOSCOPY W BIOPSY  11/24/08     HC COLONOSCOPY W/WO BRUSH/WASH  11/21/2005    Polypectomy.     HC VASECTOMY UNILAT/BILAT W POSTOP SEMEN  1977    Vasectomy       FAMILY HISTORY:  Family History   Problem Relation Age of Onset     CANCER Mother      Lung Cancer     Cancer - colorectal Father      Stomach Cancer     CANCER Brother        SOCIAL HISTORY:  Social History     Social History     Marital status:      Spouse name: Bia     Number of children: 2     Years of education: 16     Occupational History     Radiologic tech [employer]     Social History Main Topics     Smoking status: Never Smoker     Smokeless tobacco: Never Used     Alcohol use 0.0 oz/week     0 Standard drinks or equivalent per week      Comment: occasional     Drug use: No     Sexual activity: Yes     Partners: Female     Other Topics Concern      Service No     Blood Transfusions No     Caffeine Concern No     Occupational Exposure Yes     Radiation,     Hobby Hazards Yes     hunting     Sleep Concern Yes     CPAP     Stress Concern Yes     job     Weight Concern Yes     would like to lose      Special Diet No     Back Care No     Exercise No     Bike Helmet No     Seat Belt Yes     Self-Exams Yes     occassional     Parent/Sibling W/ Cabg, Mi Or Angioplasty Before 65f 55m? No     Social History Narrative       Review of Systems:  Skin:  Negative     Eyes:  Positive for  "glasses  ENT:  Negative    Respiratory:  Positive for dyspnea on exertion;sleep apnea  Cardiovascular:  Negative for;palpitations;chest pain;edema;lightheadedness;dizziness;fatigue    Gastroenterology: Negative    Genitourinary:  Negative    Musculoskeletal:  Positive for arthritis  Neurologic:  Negative    Psychiatric:  Negative    Heme/Lymph/Imm:  Positive for    Endocrine:  Negative      Physical Exam:  Vitals: /72 (BP Location: Left arm, Patient Position: Fowlers, Cuff Size: Adult Large)  Pulse 60  Ht 1.727 m (5' 8\")  Wt 130 kg (286 lb 11.2 oz)  SpO2 97%  BMI 43.59 kg/m2    Constitutional:  cooperative, alert and oriented, well developed, well nourished, in no acute distress obese      Skin:  warm and dry to the touch        Head:  normocephalic        Eyes:  no xanthalasma        ENT:  no pallor or cyanosis        Neck:           Chest:  normal breath sounds, clear to auscultation, normal A-P diameter, normal symmetry, normal respiratory excursion, no use of accessory muscles        Cardiac: regular rhythm, normal S1/S2, no S3 or S4, apical impulse not displaced, no murmurs, gallops or rubs                  Abdomen:           Vascular:                                        Extremities and Back:           Neurological:  no gross motor deficits;affect appropriate, oriented to time, person and place          Recent Lab Results:  LIPID RESULTS:  Lab Results   Component Value Date    CHOL 163 12/08/2016    HDL 51 12/08/2016    LDL 81 05/02/2017    LDL 75 12/08/2016    TRIG 185 (H) 12/08/2016    CHOLHDLRATIO 5.0 11/03/2014       LIVER ENZYME RESULTS:  Lab Results   Component Value Date    AST 12 05/02/2017    ALT 28 05/02/2017       CBC RESULTS:  Lab Results   Component Value Date    WBC 12.0 (H) 05/02/2017    RBC 4.74 05/02/2017    HGB 15.0 05/02/2017    HCT 42.3 05/02/2017    MCV 89 05/02/2017    MCH 31.6 05/02/2017    MCHC 35.5 05/02/2017    RDW 11.9 05/02/2017     05/02/2017       BMP " RESULTS:  Lab Results   Component Value Date     05/02/2017    POTASSIUM 3.7 05/02/2017    CHLORIDE 102 05/02/2017    CO2 24 05/02/2017    ANIONGAP 13 05/02/2017     (H) 05/02/2017    BUN 24 05/02/2017    CR 0.87 05/02/2017    GFRESTIMATED 87 05/02/2017    GFRESTBLACK >90   GFR Calc   05/02/2017    ALLEN 8.9 05/02/2017        A1C RESULTS:  Lab Results   Component Value Date    A1C 6.6 (H) 05/03/2017       INR RESULTS:  No results found for: INR      Brad Mcginnis MD, FACC    CC  Brad Mcginnis MD  [address] [place] [id number]

## 2017-09-18 NOTE — LETTER
9/18/2017      RE: Ben Cerno  [address] [facility] [address]       Dear Colleague,    Thank you for the opportunity to participate in the care of your patient, Ben Ceron, at the [facility]. Please see a copy of my visit note below.    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 He has a history of morbid obesity, hyperlipidemia, obstructive sleep apnea, and recently diagnosed diabetes.    At our last visit we initiated Procardia dose of 30 mg per day. The patient had had good blood pressure results with Norvasc but had problems with muscle aches. He has had a cough from lisinopril. He is currently also on Toprol- mg a day and losartan hydrochlorothiazide 100-25 milligrams per day.    Once again Ben brought in his blood pressure readings which she has been doing regularly. He brought his machine along with him and it turns out that his machine is 20 points higher than ours. His blood pressure readings are significantly improved from our last visit but systolic pressures are typically in the 150s with a fair number in the 140s and occasional numbers in the 160s. He has not had any adverse side effects from his Procardia.    Ben also describes issues of these had the past with statins. He was on Lipitor for many years tolerating it well but then tried to switch over to simvastatin because it had gone generic at the time. He developed severe cellulitis of his lower extremities and then was put on Crestor was also cause cellulitis. He tried going back to Lipitor and it also cause cellulitis so he eventually was started on Zetia which is done a good job of controlling his cholesterol without side effects.    Mr. Ceron has not had any problems with chest pain and he had a normal nuclear stress test done  recently. He also denies problems with palpitations, claudication, significant peripheral edema, PND/orthopnea, and syncope or near syncope.      ASSESSMENT/PLAN:    1.  Hypertension. The patient's blood pressure today in clinic is 140. His home but pressure measurements in generally been in the 150s, although his home machine does not appear to be accurate. I asked him to obtain a new machine which hopefully will be more accurate, and suggested that he bring it in to the clinic to check calibration.. In the meantime I'll increase his Procardia to 60 mg per day. Ultimately I would hope to be able to stop or decrease his beta blocker, since it seemed to be the least effective of his medications terms of blood pressure lowering.  2. Hyperlipidemia. Ideally katina should be on a statin considering his new diagnosis of diabetes. His cholesterol is quite well-controlled with Zetia, but this agent does not have the data to support the degree of benefits we would expect to see with statins. The patient reports that his wife uses simvastatin 80's willing to give this a try. When he used several years ago he developed his lower extremity rash within a few doses and he'll let me know if he is able to take it for a few days without this and will switch him over. Obviously, if he once again develops cellulitis with use of statins, we will keep him on Zetia long-term.    Thank you for S me to participate in your patient's care. Please hesitate to call if there are questions or concerns about him. He'll continue to measure his blood pressure regularly and I'll plan on having him back in 3 months.    Orders Placed This Encounter   Procedures     Follow-Up with Cardiologist     Orders Placed This Encounter   Medications     NIFEdipine ER (ADALAT CC) 60 MG 24 hr tablet     Sig: Take 1 tablet (60 mg) by mouth daily     Dispense:  90 tablet     Refill:  3     metoprolol (TOPROL-XL) 100 MG 24 hr tablet     Sig: Take 1 tablet (100 mg)  by mouth daily     Dispense:  90 tablet     Refill:  1     Medications Discontinued During This Encounter   Medication Reason     NIFEdipine ER osmotic (ADALAT CC) 30 MG TB24 Reorder     metoprolol (TOPROL-XL) 100 MG 24 hr tablet Reorder         Encounter Diagnoses   Name Primary?     Benign essential hypertension      Essential hypertension with goal blood pressure less than 140/90        CURRENT MEDICATIONS:  Current Outpatient Prescriptions   Medication Sig Dispense Refill     NIFEdipine ER (ADALAT CC) 60 MG 24 hr tablet Take 1 tablet (60 mg) by mouth daily 90 tablet 3     metoprolol (TOPROL-XL) 100 MG 24 hr tablet Take 1 tablet (100 mg) by mouth daily 90 tablet 1     losartan-hydrochlorothiazide (HYZAAR) 100-25 MG per tablet TAKE ONE TABLET BY MOUTH ONCE DAILY 90 tablet 0     metFORMIN (GLUCOPHAGE) 500 MG tablet Take 1 tablet (500 mg) by mouth 2 times daily (with meals) 180 tablet 1     Acetaminophen (TYLENOL PO) As needed       clobetasol (TEMOVATE) 0.05 % cream Small amount to affected areas BID 60 g 1     finasteride (PROSCAR) 5 MG tablet Take 1 tablet (5 mg) by mouth daily 90 tablet 1     order for DME Equipment being ordered: CPAP and related hardware, mask and tubing as well heated humidity equipment. 1 Units 0     desoximetasone (TOPICORT) 0.25 % cream use as directed to legs 100 g 1     aspirin 81 MG tablet Take by mouth daily 30 tablet      ezetimibe (ZETIA) 10 MG tablet Take 1 tablet (10 mg) by mouth daily 30 tablet 6     ADVIL 200 MG OR TABS Reported on 5/2/2017 120 0     VITAMIN E 400 UNIT OR CAPS   0     [DISCONTINUED] NIFEdipine ER osmotic (ADALAT CC) 30 MG TB24 Take 1 tablet (30 mg) by mouth daily 30 tablet 3     [DISCONTINUED] metoprolol (TOPROL-XL) 100 MG 24 hr tablet Take 1 tablet (100 mg) by mouth daily 90 tablet 1     Cholecalciferol (VITAMIN D) 2000 UNITS tablet Take 1 tablet by mouth daily.         ALLERGIES     Allergies   Allergen Reactions     Norvasc [Amlodipine] Muscle Pain (Myalgia)      Simvastatin Rash     Rash and edema       PAST MEDICAL HISTORY:  Past Medical History:   Diagnosis Date     Benign non-nodular prostatic hyperplasia, presence of lower urinary tract symptoms unspecified 5/31/2016     Colonic polyps      Family history of colon cancer 4/4/2016    brother with colonoscopy      Hyperlipidemia LDL goal <100 5/3/2017     Hyperlipidemia LDL goal <130 12/8/2016     LUCAS (obstructive sleep apnea) 4/4/2016     Rash 4/4/2016       PAST SURGICAL HISTORY:  Past Surgical History:   Procedure Laterality Date     ARTHROSCOPY KNEE WITH MEDIAL MENISCECTOMY  6/6/2014    Procedure: ARTHROSCOPY KNEE WITH MEDIAL MENISCECTOMY;  Surgeon: Ramana Dominguez DO;  [facility]     COLONOSCOPY  11/29/2011    Polypectomy.     COLONOSCOPY N/A 11/12/2014    Procedure: COLONOSCOPY;  Surgeon: Brooks Burris MD;  [facility]     HC COLONOSCOPY W BIOPSY  11/24/08     HC COLONOSCOPY W/WO BRUSH/WASH  11/21/2005    Polypectomy.     HC VASECTOMY UNILAT/BILAT W POSTOP SEMEN  1977    Vasectomy       FAMILY HISTORY:  Family History   Problem Relation Age of Onset     CANCER Mother      Lung Cancer     Cancer - colorectal Father      Stomach Cancer     CANCER Brother        SOCIAL HISTORY:  Social History     Social History     Marital status:      Spouse name: Bai     Number of children: 2     Years of education: 16     Occupational History     Radiologic tech [employer]     Social History Main Topics     Smoking status: Never Smoker     Smokeless tobacco: Never Used     Alcohol use 0.0 oz/week     0 Standard drinks or equivalent per week      Comment: occasional     Drug use: No     Sexual activity: Yes     Partners: Female     Other Topics Concern      Service No     Blood Transfusions No     Caffeine Concern No     Occupational Exposure Yes     Radiation,     Hobby Hazards Yes     hunting     Sleep Concern Yes     CPAP     Stress Concern Yes     job     Weight Concern  "Yes     would like to lose      Special Diet No     Back Care No     Exercise No     Bike Helmet No     Seat Belt Yes     Self-Exams Yes     occassional     Parent/Sibling W/ Cabg, Mi Or Angioplasty Before 65f 55m? No     Social History Narrative       Review of Systems:  Skin:  Negative     Eyes:  Positive for glasses  ENT:  Negative    Respiratory:  Positive for dyspnea on exertion;sleep apnea  Cardiovascular:  Negative for;palpitations;chest pain;edema;lightheadedness;dizziness;fatigue    Gastroenterology: Negative    Genitourinary:  Negative    Musculoskeletal:  Positive for arthritis  Neurologic:  Negative    Psychiatric:  Negative    Heme/Lymph/Imm:  Positive for    Endocrine:  Negative      Physical Exam:  Vitals: /72 (BP Location: Left arm, Patient Position: Fowlers, Cuff Size: Adult Large)  Pulse 60  Ht 1.727 m (5' 8\")  Wt 130 kg (286 lb 11.2 oz)  SpO2 97%  BMI 43.59 kg/m2    Constitutional:  cooperative, alert and oriented, well developed, well nourished, in no acute distress obese      Skin:  warm and dry to the touch        Head:  normocephalic        Eyes:  no xanthalasma        ENT:  no pallor or cyanosis        Neck:           Chest:  normal breath sounds, clear to auscultation, normal A-P diameter, normal symmetry, normal respiratory excursion, no use of accessory muscles        Cardiac: regular rhythm, normal S1/S2, no S3 or S4, apical impulse not displaced, no murmurs, gallops or rubs                  Abdomen:           Vascular:                                        Extremities and Back:           Neurological:  no gross motor deficits;affect appropriate, oriented to time, person and place          Recent Lab Results:  LIPID RESULTS:  Lab Results   Component Value Date    CHOL 163 12/08/2016    HDL 51 12/08/2016    LDL 81 05/02/2017    LDL 75 12/08/2016    TRIG 185 (H) 12/08/2016    CHOLHDLRATIO 5.0 11/03/2014       LIVER ENZYME RESULTS:  Lab Results   Component Value Date    AST 12 " 05/02/2017    ALT 28 05/02/2017       CBC RESULTS:  Lab Results   Component Value Date    WBC 12.0 (H) 05/02/2017    RBC 4.74 05/02/2017    HGB 15.0 05/02/2017    HCT 42.3 05/02/2017    MCV 89 05/02/2017    MCH 31.6 05/02/2017    MCHC 35.5 05/02/2017    RDW 11.9 05/02/2017     05/02/2017       BMP RESULTS:  Lab Results   Component Value Date     05/02/2017    POTASSIUM 3.7 05/02/2017    CHLORIDE 102 05/02/2017    CO2 24 05/02/2017    ANIONGAP 13 05/02/2017     (H) 05/02/2017    BUN 24 05/02/2017    CR 0.87 05/02/2017    GFRESTIMATED 87 05/02/2017    GFRESTBLACK >90   GFR Calc   05/02/2017    ALLEN 8.9 05/02/2017        A1C RESULTS:  Lab Results   Component Value Date    A1C 6.6 (H) 05/03/2017       INR RESULTS:  No results found for: INR      Brad Mcginnis MD, [facility]

## 2017-10-04 ENCOUNTER — TELEPHONE (OUTPATIENT)
Dept: FAMILY MEDICINE | Facility: OTHER | Age: 72
End: 2017-10-04

## 2017-10-04 NOTE — TELEPHONE ENCOUNTER
Reason for Call:  Form, our goal is to have forms completed with 72 hours, however, some forms may require a visit or additional information.    Type of letter, form or note:  medical    Who is the form from?: [employer]  (if other please explain)    Where did the form come from: form was faxed in    What clinic location was the form placed at?: [facility] - [phone]    Where the form was placed: 's Box    What number is listed as a contact on the form?: cannot read it        Additional comments: none     Call taken on 10/4/2017 at 1:41 PM by Mahnaz Pena

## 2017-10-05 ENCOUNTER — MEDICAL CORRESPONDENCE (OUTPATIENT)
Dept: HEALTH INFORMATION MANAGEMENT | Facility: CLINIC | Age: 72
End: 2017-10-05

## 2017-10-05 NOTE — TELEPHONE ENCOUNTER
Forms have been completed, signed, faxed/mailed, and sent to scanning.  Inga Ayala CMA ([facility])

## 2017-11-04 DIAGNOSIS — E11.9 CONTROLLED TYPE 2 DIABETES MELLITUS WITHOUT COMPLICATION, WITHOUT LONG-TERM CURRENT USE OF INSULIN (H): ICD-10-CM

## 2017-11-04 DIAGNOSIS — I10 ESSENTIAL HYPERTENSION WITH GOAL BLOOD PRESSURE LESS THAN 140/90: ICD-10-CM

## 2017-11-06 ENCOUNTER — TELEPHONE (OUTPATIENT)
Dept: FAMILY MEDICINE | Facility: OTHER | Age: 72
End: 2017-11-06

## 2017-11-06 DIAGNOSIS — I10 ESSENTIAL HYPERTENSION WITH GOAL BLOOD PRESSURE LESS THAN 140/90: ICD-10-CM

## 2017-11-06 NOTE — TELEPHONE ENCOUNTER
Summary:    Patient is due/failing the following:   Eye and foot exam, Microalbumin, A1C, COLONOSCOPY and FOLLOW UP    Action needed:   Patient needs office visit with Cardiology ., Patient needs non-fasting lab only appointment and schedule a colonoscopy or complete a FIT test     Type of outreach:    Phone, left message for patient to call back.     Questions for provider review:    None                                                                                                                                    Kimmie Combs       Chart routed to Care Team .      Panel Management Review      Patient has the following on his problem list:     Hypertension   Last three blood pressure readings:  BP Readings from Last 3 Encounters:   09/18/17 140/72   08/07/17 156/90   06/28/17 156/90     Blood pressure: FAILED    HTN Guidelines:  Age 18-59 BP range:  Less than 140/90  Age 60-85 with Diabetes:  Less than 140/90  Age 60-85 without Diabetes:  less than 150/90        Composite cancer screening  Chart review shows that this patient is due/due soon for the following Colonoscopy

## 2017-11-06 NOTE — TELEPHONE ENCOUNTER
Requested Prescriptions   Pending Prescriptions Disp Refills     metFORMIN (GLUCOPHAGE) 500 MG tablet [Pharmacy Med Name: METFORMIN 500MG TAB] 180 tablet 1     Sig: TAKE ONE TABLET BY MOUTH TWICE DAILY WITH MEALS    Biguanide Agents Failed    11/6/2017  5:00 PM       Failed - Patient's BP is less than 140/90    BP Readings from Last 3 Encounters:   09/18/17 140/72   08/07/17 156/90   06/28/17 156/90                Failed - Patient has had a Microalbumin in the past 12 mos.    Recent Labs   Lab Test  12/03/15   0934   MICROL  82   UMALCR  60.15*            Failed - Patient has documented A1c within the specified period of time.    Recent Labs   Lab Test  05/03/17   1326   A1C  6.6*            Passed - Patient has documented LDL within the past 12 mos.    Recent Labs   Lab Test  05/02/17   1532   LDL  81            Passed - Patient is age 10 or older       Passed - Patient's CR is NOT>1.4 OR Patient's EGFR is NOT<45 within past 12 mos.    Recent Labs   Lab Test  05/02/17   1532   GFRESTIMATED  87   GFRESTBLACK  >90   GFR Calc         Recent Labs   Lab Test  05/02/17   1532   CR  0.87            Passed - Patient does NOT have a diagnosis of CHF.       Passed - Recent (6 mos) or future visit with authorizing provider's specialty    Patient had office visit in the last 6 months or has a visit in the next 30 days with authorizing provider.  See chart review.             Routing refill request to provider for review/approval because:  Labs not current:  Microalbumin.  Last microalbumin was in 2015.  A1C is due this month.    Meena Kaba RN

## 2017-11-06 NOTE — TELEPHONE ENCOUNTER
Patient returned call and was given message below. Patient states he is going to make an appointment for a cardiology and also he states that he is not due for a colonoscopy he states it isn't been 5 yrs yet.    Thank you Sandi

## 2017-11-06 NOTE — TELEPHONE ENCOUNTER
Requested Prescriptions   Pending Prescriptions Disp Refills     losartan-hydrochlorothiazide (HYZAAR) 100-25 MG per tablet [Pharmacy Med Name: LOSARTAN/HCTZ 100-25MG TAB] 90 tablet 0     Sig: TAKE ONE TABLET BY MOUTH ONCE DAILY FOLLOW  UP  BLOOD  PRESSURE  RECHECK  FOR  FURTHER  REFILLS    Angiotensin-II Receptors Failed    11/6/2017  8:09 AM       Failed - Blood pressure under 140/90 in past 12 months.    BP Readings from Last 3 Encounters:   09/18/17 140/72   08/07/17 156/90   06/28/17 156/90                Passed - Recent or future visit with authorizing provider's specialty    Patient had office visit in the last year or has a visit in the next 30 days with authorizing provider.  See chart review.              Passed - Patient is age 18 or older       Passed - Normal serum creatinine on file in past 12 months    Recent Labs   Lab Test  05/02/17   1532   CR  0.87            Passed - Normal serum potassium on file in past 12 months    Recent Labs   Lab Test  05/02/17   1532   POTASSIUM  3.7

## 2017-11-06 NOTE — TELEPHONE ENCOUNTER
Routing refill request to provider for review/approval because:  Labs out of range:  BP    Meena Carr, RN  [facility]

## 2017-11-07 RX ORDER — LOSARTAN POTASSIUM AND HYDROCHLOROTHIAZIDE 25; 100 MG/1; MG/1
1 TABLET ORAL DAILY
Qty: 90 TABLET | Refills: 0 | OUTPATIENT
Start: 2017-11-07

## 2017-11-07 RX ORDER — LOSARTAN POTASSIUM AND HYDROCHLOROTHIAZIDE 25; 100 MG/1; MG/1
TABLET ORAL
Qty: 90 TABLET | Refills: 0 | Status: SHIPPED | OUTPATIENT
Start: 2017-11-07 | End: 2017-12-11

## 2017-11-07 NOTE — TELEPHONE ENCOUNTER
Requested Prescriptions   Pending Prescriptions Disp Refills     losartan-hydrochlorothiazide (HYZAAR) 100-25 MG per tablet 90 tablet 0     Sig: Take 1 tablet by mouth daily    Angiotensin-II Receptors Failed    11/6/2017  6:57 PM       Failed - Blood pressure under 140/90 in past 12 months.    BP Readings from Last 3 Encounters:   09/18/17 140/72   08/07/17 156/90   06/28/17 156/90                Passed - Recent or future visit with authorizing provider's specialty    Patient had office visit in the last year or has a visit in the next 30 days with authorizing provider.  See chart review.   Last ov 5/02/2017           Passed - Patient is age 18 or older       Passed - Normal serum creatinine on file in past 12 months    Recent Labs   Lab Test  05/02/17   1532   CR  0.87            Passed - Normal serum potassium on file in past 12 months    Recent Labs   Lab Test  05/02/17   1532   POTASSIUM  3.7      medication approved by provider.  Willy Catalan, RN, BSN

## 2017-12-11 ENCOUNTER — OFFICE VISIT (OUTPATIENT)
Dept: CARDIOLOGY | Facility: CLINIC | Age: 72
End: 2017-12-11
Payer: COMMERCIAL

## 2017-12-11 VITALS
BODY MASS INDEX: 43.21 KG/M2 | DIASTOLIC BLOOD PRESSURE: 72 MMHG | WEIGHT: 285.1 LBS | HEIGHT: 68 IN | SYSTOLIC BLOOD PRESSURE: 128 MMHG | OXYGEN SATURATION: 98 % | HEART RATE: 68 BPM

## 2017-12-11 DIAGNOSIS — I10 ESSENTIAL HYPERTENSION WITH GOAL BLOOD PRESSURE LESS THAN 140/90: ICD-10-CM

## 2017-12-11 DIAGNOSIS — I10 BENIGN ESSENTIAL HYPERTENSION: ICD-10-CM

## 2017-12-11 DIAGNOSIS — E11.9 CONTROLLED TYPE 2 DIABETES MELLITUS WITHOUT COMPLICATION, WITHOUT LONG-TERM CURRENT USE OF INSULIN (H): ICD-10-CM

## 2017-12-11 PROCEDURE — 99214 OFFICE O/P EST MOD 30 MIN: CPT | Performed by: INTERNAL MEDICINE

## 2017-12-11 RX ORDER — LOSARTAN POTASSIUM AND HYDROCHLOROTHIAZIDE 25; 100 MG/1; MG/1
TABLET ORAL
Qty: 90 TABLET | Refills: 3 | Status: SHIPPED | OUTPATIENT
Start: 2017-12-11 | End: 2018-04-27

## 2017-12-11 NOTE — LETTER
12/11/2017    Onel Malone PA-C  [facility]   [address] [address] [address]    RE: Ben Ceron       Dear Colleague,    I had the pleasure of seeing Ben Ceron in the [facility].    HISTORY:    Ben Ceron is a pleasant 72-year-old male with a history of hypertension, morbid obesity, hyperlipidemia, obstructive sleep apnea, and type 2 diabetes.    At his last visit Ben brought in the list of blood pressure readings done at home and brought his blood pressure machine in. His machine read considerably higher than our reading and he has now purchased a new machine. He once again brings in his home readings and they are much better now. He has some fluctuation with occasional blood pressures in the 120s and 150s but most of his blood pressure readings are in the 130s or low 140s, average less than 140. His pressure in the office today is 120/72. He is tolerating his current blood pressure medications which include losartan hydrochlorothiazide, nifedipine, and metoprolol.    In the past Ben has tried several statins. He apparently was on Lipitor for about 8 years but was then switched over to simvastatin when it went generic. He developed severe itching of his legs from this and it was stopped. He was then put on Crestor but it also caused leg itching. He was then put back on his Lipitor and did the same thing. He has been off of statins for a couple of years and instead was put on Zetia, which he has tolerated without problems. Now that he has been diagnosed with diabetes he should be on a statin if possible. Since our last visit he once again tried taking simvastatin (his wife is on this), but he almost immediately once again developed a rash in his legs. He is not currently using a statin.    ASSESSMENT/PLAN:    1.  Hypertension. Well controlled with current medications, I discussed the possibility of discontinuing beta blocker but I suspect his blood pressure  will rise and we elected to leave his medications unchanged.  2. Hyperlipidemia. Although Zetia is bringing his cholesterol down, it has not been proven effective at prevention of myocardial infarction the setting of diabetes. My preference would be to have him on a statin. We talked about the possibility of trying one of his other agents again. Given the recurrent problems of calf itching as discussed above, it is probably best to give him a statin free period before trying statins again. We'll await until spring and then consider adding low-dose Lipitor a few days a week.  3. Diabetes. Recently diagnosed. I encouraged him to work on weight loss which may successfully her first the diabetes and his situation. He is going to be spending his winter in [place] will work on his weight problem.    Thank you for asking me to participate in your patient's care. Please don't hesitate to call if I can be of further assistance.    No orders of the defined types were placed in this encounter.    Orders Placed This Encounter   Medications     losartan-hydrochlorothiazide (HYZAAR) 100-25 MG per tablet     Sig: TAKE ONE TABLET BY MOUTH ONCE DAILY FOLLOW  UP  BLOOD  PRESSURE  RECHECK  FOR  FURTHER  REFILLS     Dispense:  90 tablet     Refill:  3     Medications Discontinued During This Encounter   Medication Reason     losartan-hydrochlorothiazide (HYZAAR) 100-25 MG per tablet Reorder         Encounter Diagnoses   Name Primary?     Benign essential hypertension      Essential hypertension with goal blood pressure less than 140/90      Controlled type 2 diabetes mellitus without complication, without long-term current use of insulin (H)        CURRENT MEDICATIONS:  Current Outpatient Prescriptions   Medication Sig Dispense Refill     losartan-hydrochlorothiazide (HYZAAR) 100-25 MG per tablet TAKE ONE TABLET BY MOUTH ONCE DAILY FOLLOW  UP  BLOOD  PRESSURE  RECHECK  FOR  FURTHER  REFILLS 90 tablet 3     metFORMIN (GLUCOPHAGE) 500 MG  tablet TAKE ONE TABLET BY MOUTH TWICE DAILY WITH MEALS 180 tablet 1     NIFEdipine ER (ADALAT CC) 60 MG 24 hr tablet Take 1 tablet (60 mg) by mouth daily 90 tablet 3     metoprolol (TOPROL-XL) 100 MG 24 hr tablet Take 1 tablet (100 mg) by mouth daily 90 tablet 1     Acetaminophen (TYLENOL PO) As needed       clobetasol (TEMOVATE) 0.05 % cream Small amount to affected areas BID 60 g 1     finasteride (PROSCAR) 5 MG tablet Take 1 tablet (5 mg) by mouth daily 90 tablet 1     desoximetasone (TOPICORT) 0.25 % cream use as directed to legs 100 g 1     aspirin 81 MG tablet Take by mouth daily 30 tablet      ezetimibe (ZETIA) 10 MG tablet Take 1 tablet (10 mg) by mouth daily 30 tablet 6     Cholecalciferol (VITAMIN D) 2000 UNITS tablet Take 1 tablet by mouth daily.       ADVIL 200 MG OR TABS Reported on 5/2/2017 120 0     VITAMIN E 400 UNIT OR CAPS   0     [DISCONTINUED] losartan-hydrochlorothiazide (HYZAAR) 100-25 MG per tablet TAKE ONE TABLET BY MOUTH ONCE DAILY FOLLOW  UP  BLOOD  PRESSURE  RECHECK  FOR  FURTHER  REFILLS 90 tablet 0     order for DME Equipment being ordered: CPAP and related hardware, mask and tubing as well heated humidity equipment. 1 Units 0       ALLERGIES     Allergies   Allergen Reactions     Norvasc [Amlodipine] Muscle Pain (Myalgia)     Simvastatin Rash     Rash and edema       PAST MEDICAL HISTORY:  Past Medical History:   Diagnosis Date     Benign non-nodular prostatic hyperplasia, presence of lower urinary tract symptoms unspecified 5/31/2016     Colonic polyps      Family history of colon cancer 4/4/2016    brother with colonoscopy      Hyperlipidemia LDL goal <100 5/3/2017     Hyperlipidemia LDL goal <130 12/8/2016     LUCAS (obstructive sleep apnea) 4/4/2016     Rash 4/4/2016       PAST SURGICAL HISTORY:  Past Surgical History:   Procedure Laterality Date     ARTHROSCOPY KNEE WITH MEDIAL MENISCECTOMY  6/6/2014    Procedure: ARTHROSCOPY KNEE WITH MEDIAL MENISCECTOMY;  Surgeon: Ramana Dominguez  DO Shimon;  Location: [facility] OR     COLONOSCOPY  11/29/2011    Polypectomy.     COLONOSCOPY N/A 11/12/2014    Procedure: COLONOSCOPY;  Surgeon: Brooks Burris MD;  Location: [facility] GI     HC COLONOSCOPY W BIOPSY  11/24/08     HC COLONOSCOPY W/WO BRUSH/WASH  11/21/2005    Polypectomy.     HC VASECTOMY UNILAT/BILAT W POSTOP SEMEN  1977    Vasectomy       FAMILY HISTORY:  Family History   Problem Relation Age of Onset     CANCER Mother      Lung Cancer     Cancer - colorectal Father      Stomach Cancer     CANCER Brother        SOCIAL HISTORY:  Social History     Social History     Marital status:      Spouse name: Bia     Number of children: 2     Years of education: 16     Occupational History     Radiologic [employer]     Social History Main Topics     Smoking status: Never Smoker     Smokeless tobacco: Never Used     Alcohol use 0.0 oz/week     0 Standard drinks or equivalent per week      Comment: occasional     Drug use: No     Sexual activity: Yes     Partners: Female     Other Topics Concern      Service No     Blood Transfusions No     Caffeine Concern No     Occupational Exposure Yes     Radiation,     Hobby Hazards Yes     hunting     Sleep Concern Yes     CPAP     Stress Concern Yes     job     Weight Concern Yes     would like to lose      Special Diet No     Back Care No     Exercise No     Bike Helmet No     Seat Belt Yes     Self-Exams Yes     occassional     Parent/Sibling W/ Cabg, Mi Or Angioplasty Before 65f 55m? No     Social History Narrative       Review of Systems:  Skin:  Negative     Eyes:  Positive for glasses  ENT:  Negative    Respiratory:  Positive for dyspnea on exertion;sleep apnea  Cardiovascular:  Negative for;palpitations;chest pain;edema;lightheadedness;dizziness;fatigue    Gastroenterology: Negative    Genitourinary:  Negative    Musculoskeletal:  Positive for arthritis  Neurologic:  Negative    Psychiatric:  Negative    Heme/Lymph/Imm:  Positive  "for allergies  Endocrine:  Negative      Physical Exam:  Vitals: /72 (BP Location: Left arm, Patient Position: Fowlers, Cuff Size: Adult Large)  Pulse 68  Ht 1.727 m (5' 8\")  Wt 129.3 kg (285 lb 1.6 oz)  SpO2 98%  BMI 43.35 kg/m2    Constitutional:  cooperative, alert and oriented, well developed, well nourished, in no acute distress obese      Skin:  warm and dry to the touch        Head:  normocephalic        Eyes:  no xanthalasma        ENT:  no pallor or cyanosis        Neck:  carotid pulses are full and equal bilaterally, JVP normal, no carotid bruit        Chest:  normal breath sounds, clear to auscultation, normal A-P diameter, normal symmetry, normal respiratory excursion, no use of accessory muscles        Cardiac: regular rhythm, normal S1/S2, no S3 or S4, apical impulse not displaced, no murmurs, gallops or rubs   distant heart sounds              Abdomen:  abdomen soft, non-tender, BS normoactive, no mass, no HSM, no bruits        Vascular: pulses full and equal                                      Extremities and Back:  no edema        Neurological:  no gross motor deficits          Recent Lab Results:  LIPID RESULTS:  Lab Results   Component Value Date    CHOL 163 12/08/2016    HDL 51 12/08/2016    LDL 81 05/02/2017    LDL 75 12/08/2016    TRIG 185 (H) 12/08/2016    CHOLHDLRATIO 5.0 11/03/2014       LIVER ENZYME RESULTS:  Lab Results   Component Value Date    AST 12 05/02/2017    ALT 28 05/02/2017       CBC RESULTS:  Lab Results   Component Value Date    WBC 12.0 (H) 05/02/2017    RBC 4.74 05/02/2017    HGB 15.0 05/02/2017    HCT 42.3 05/02/2017    MCV 89 05/02/2017    MCH 31.6 05/02/2017    MCHC 35.5 05/02/2017    RDW 11.9 05/02/2017     05/02/2017       BMP RESULTS:  Lab Results   Component Value Date     05/02/2017    POTASSIUM 3.7 05/02/2017    CHLORIDE 102 05/02/2017    CO2 24 05/02/2017    ANIONGAP 13 05/02/2017     (H) 05/02/2017    BUN 24 05/02/2017    CR 0.87 " 05/02/2017    GFRESTIMATED 87 05/02/2017    GFRESTBLACK >90   GFR Calc   05/02/2017    ALLEN 8.9 05/02/2017        A1C RESULTS:  Lab Results   Component Value Date    A1C 6.6 (H) 05/03/2017       INR RESULTS:  No results found for: INR    Thank you for allowing me to participate in the care of your patient.    Sincerely,     Brad Mcginnis MD     [facility]

## 2017-12-11 NOTE — MR AVS SNAPSHOT
"              After Visit Summary   12/11/2017    Ben Ceron    MRN: [id number]           Patient Information     Date Of Birth          1945        Visit Information        Provider Department      12/11/2017 11:00 AM Brad Mcginnis MD [facility]        Today's Diagnoses     Benign essential hypertension        Essential hypertension with goal blood pressure less than 140/90        Controlled type 2 diabetes mellitus without complication, without long-term current use of insulin (H)           Follow-ups after your visit        Who to contact     If you have questions or need follow up information about today's clinic visit or your schedule please contact [facility] directly at [phone].  Normal or non-critical lab and imaging results will be communicated to you by MyChart, letter or phone within 4 business days after the clinic has received the results. If you do not hear from us within 7 days, please contact the clinic through MyChart or phone. If you have a critical or abnormal lab result, we will notify you by phone as soon as possible.  Submit refill requests through [employer] or call your pharmacy and they will forward the refill request to us. Please allow 3 business days for your refill to be completed.          Additional Information About Your Visit        MyChart Information     [employer] lets you send messages to your doctor, view your test results, renew your prescriptions, schedule appointments and more. To sign up, go to [email or web address] . Click on \"Log in\" on the left side of the screen, which will take you to the Welcome page. Then click on \"Sign up Now\" on the right side of the page.     You will be asked to enter the access code listed below, as well as some personal information. Please follow the directions to create your username and password.     Your access code is: " [email or web address]  Expires: 3/11/2018 11:28 AM     Your access code will  in 90 days. If you need help or a new code, please call your [facility] or [phone].        Care EveryWhere ID     This is your Care EveryWhere ID. This could be used by other organizations to access your [facility] medical records  [id number]        Your Vitals Were     Pulse Height Pulse Oximetry BMI (Body Mass Index)          68 1.727 m (5' 8\") 98% 43.35 kg/m2         Blood Pressure from Last 3 Encounters:   17 128/72   17 140/72   17 156/90    Weight from Last 3 Encounters:   17 129.3 kg (285 lb 1.6 oz)   17 130 kg (286 lb 11.2 oz)   17 131.3 kg (289 lb 8 oz)              We Performed the Following     Follow-Up with Cardiologist          Today's Medication Changes          These changes are accurate as of: 17 11:28 AM.  If you have any questions, ask your nurse or doctor.               These medicines have changed or have updated prescriptions.        Dose/Directions    losartan-hydrochlorothiazide 100-25 MG per tablet   Commonly known as:  HYZAAR   This may have changed:  See the new instructions.   Used for:  Essential hypertension with goal blood pressure less than 140/90        TAKE ONE TABLET BY MOUTH ONCE DAILY FOLLOW  UP  BLOOD  PRESSURE  RECHECK  FOR  FURTHER  REFILLS   Quantity:  90 tablet   Refills:  3            Where to get your medicines      These medications were sent to [facility] [address] [place] [address] [address]  [address] [facility] [address]     Phone:  [phone]     losartan-hydrochlorothiazide 100-25 MG per tablet                Primary Care Provider Office Phone # Fax #    Onel Green PA-C [phone]       [facility] [address] [facility] [address]        Equal Access to Services     REMY SANTAMARIA AH: Hadii princess stein hadasho Soomaali, waaxda luqadaha, qaybta kaalmada adeegyada, blanche liz. So " [facility] [phone].    ATENCIÓN: Si jason sinha, tiene a morales disposición servicios gratuitos de asistencia lingüística. Aleyda christie [phone].    We comply with applicable federal civil rights laws and [place] laws. We do not discriminate on the basis of race, color, national origin, age, disability, sex, sexual orientation, or gender identity.            Thank you!     Thank you for choosing [facility]  for your care. Our goal is always to provide you with excellent care. Hearing back from our patients is one way we can continue to improve our services. Please take a few minutes to complete the written survey that you may receive in the mail after your visit with us. Thank you!             Your Updated Medication List - Protect others around you: Learn how to safely use, store and throw away your medicines at www.disposemymeds.org.          This list is accurate as of: 12/11/17 11:28 AM.  Always use your most recent med list.                   Brand Name Dispense Instructions for use Diagnosis    ADVIL 200 MG tablet   Generic drug:  ibuprofen     120    Reported on 5/2/2017        aspirin 81 MG tablet     30 tablet    Take by mouth daily        clobetasol 0.05 % cream    TEMOVATE    60 g    Small amount to affected areas BID    Yeast dermatitis       desoximetasone 0.25 % cream    TOPICORT    100 g    use as directed to legs    Rash       ezetimibe 10 MG tablet    ZETIA    30 tablet    Take 1 tablet (10 mg) by mouth daily    Hyperlipidemia LDL goal <130       finasteride 5 MG tablet    PROSCAR    90 tablet    Take 1 tablet (5 mg) by mouth daily    Benign non-nodular prostatic hyperplasia, presence of lower urinary tract symptoms unspecified       losartan-hydrochlorothiazide 100-25 MG per tablet    HYZAAR    90 tablet    TAKE ONE TABLET BY MOUTH ONCE DAILY FOLLOW  UP  BLOOD  PRESSURE  RECHECK  FOR  FURTHER  REFILLS    Essential hypertension with goal blood pressure less  than 140/90       metFORMIN 500 MG tablet    GLUCOPHAGE    180 tablet    TAKE ONE TABLET BY MOUTH TWICE DAILY WITH MEALS    Controlled type 2 diabetes mellitus without complication, without long-term current use of insulin (H)       metoprolol 100 MG 24 hr tablet    TOPROL-XL    90 tablet    Take 1 tablet (100 mg) by mouth daily    Essential hypertension with goal blood pressure less than 140/90       NIFEdipine ER 60 MG 24 hr tablet    ADALAT CC    90 tablet    Take 1 tablet (60 mg) by mouth daily    Benign essential hypertension       order for DME     1 Units    Equipment being ordered: CPAP and related hardware, mask and tubing as well heated humidity equipment.    LUCAS (obstructive sleep apnea), Morbid obesity with BMI of 45.0-49.9, adult (H)       TYLENOL PO      As needed        vitamin D 2000 UNITS tablet      Take 1 tablet by mouth daily.        vitamin E 400 UNIT capsule

## 2017-12-11 NOTE — PROGRESS NOTES
HISTORY:    Ben Ceron is a pleasant 72-year-old male with a history of hypertension, morbid obesity, hyperlipidemia, obstructive sleep apnea, and type 2 diabetes.    At his last visit Ben brought in the list of blood pressure readings done at home and brought his blood pressure machine in. His machine read considerably higher than our reading and he has now purchased a new machine. He once again brings in his home readings and they are much better now. He has some fluctuation with occasional blood pressures in the 120s and 150s but most of his blood pressure readings are in the 130s or low 140s, average less than 140. His pressure in the office today is 120/72. He is tolerating his current blood pressure medications which include losartan hydrochlorothiazide, nifedipine, and metoprolol.    In the past Ben has tried several statins. He apparently was on Lipitor for about 8 years but was then switched over to simvastatin when it went generic. He developed severe itching of his legs from this and it was stopped. He was then put on Crestor but it also caused leg itching. He was then put back on his Lipitor and did the same thing. He has been off of statins for a couple of years and instead was put on Zetia, which he has tolerated without problems. Now that he has been diagnosed with diabetes he should be on a statin if possible. Since our last visit he once again tried taking simvastatin (his wife is on this), but he almost immediately once again developed a rash in his legs. He is not currently using a statin.    ASSESSMENT/PLAN:    1.  Hypertension. Well controlled with current medications, I discussed the possibility of discontinuing beta blocker but I suspect his blood pressure will rise and we elected to leave his medications unchanged.  2. Hyperlipidemia. Although Zetia is bringing his cholesterol down, it has not been proven effective at prevention of myocardial infarction the setting of diabetes. My  preference would be to have him on a statin. We talked about the possibility of trying one of his other agents again. Given the recurrent problems of calf itching as discussed above, it is probably best to give him a statin free period before trying statins again. We'll await until spring and then consider adding low-dose Lipitor a few days a week.  3. Diabetes. Recently diagnosed. I encouraged him to work on weight loss which may successfully her first the diabetes and his situation. He is going to be spending his winter in [place] will work on his weight problem.    Thank you for asking me to participate in your patient's care. Please don't hesitate to call if I can be of further assistance.    No orders of the defined types were placed in this encounter.    Orders Placed This Encounter   Medications     losartan-hydrochlorothiazide (HYZAAR) 100-25 MG per tablet     Sig: TAKE ONE TABLET BY MOUTH ONCE DAILY FOLLOW  UP  BLOOD  PRESSURE  RECHECK  FOR  FURTHER  REFILLS     Dispense:  90 tablet     Refill:  3     Medications Discontinued During This Encounter   Medication Reason     losartan-hydrochlorothiazide (HYZAAR) 100-25 MG per tablet Reorder         Encounter Diagnoses   Name Primary?     Benign essential hypertension      Essential hypertension with goal blood pressure less than 140/90      Controlled type 2 diabetes mellitus without complication, without long-term current use of insulin (H)        CURRENT MEDICATIONS:  Current Outpatient Prescriptions   Medication Sig Dispense Refill     losartan-hydrochlorothiazide (HYZAAR) 100-25 MG per tablet TAKE ONE TABLET BY MOUTH ONCE DAILY FOLLOW  UP  BLOOD  PRESSURE  RECHECK  FOR  FURTHER  REFILLS 90 tablet 3     metFORMIN (GLUCOPHAGE) 500 MG tablet TAKE ONE TABLET BY MOUTH TWICE DAILY WITH MEALS 180 tablet 1     NIFEdipine ER (ADALAT CC) 60 MG 24 hr tablet Take 1 tablet (60 mg) by mouth daily 90 tablet 3     metoprolol (TOPROL-XL) 100 MG 24 hr tablet Take 1 tablet  (100 mg) by mouth daily 90 tablet 1     Acetaminophen (TYLENOL PO) As needed       clobetasol (TEMOVATE) 0.05 % cream Small amount to affected areas BID 60 g 1     finasteride (PROSCAR) 5 MG tablet Take 1 tablet (5 mg) by mouth daily 90 tablet 1     desoximetasone (TOPICORT) 0.25 % cream use as directed to legs 100 g 1     aspirin 81 MG tablet Take by mouth daily 30 tablet      ezetimibe (ZETIA) 10 MG tablet Take 1 tablet (10 mg) by mouth daily 30 tablet 6     Cholecalciferol (VITAMIN D) 2000 UNITS tablet Take 1 tablet by mouth daily.       ADVIL 200 MG OR TABS Reported on 5/2/2017 120 0     VITAMIN E 400 UNIT OR CAPS   0     [DISCONTINUED] losartan-hydrochlorothiazide (HYZAAR) 100-25 MG per tablet TAKE ONE TABLET BY MOUTH ONCE DAILY FOLLOW  UP  BLOOD  PRESSURE  RECHECK  FOR  FURTHER  REFILLS 90 tablet 0     order for DME Equipment being ordered: CPAP and related hardware, mask and tubing as well heated humidity equipment. 1 Units 0       ALLERGIES     Allergies   Allergen Reactions     Norvasc [Amlodipine] Muscle Pain (Myalgia)     Simvastatin Rash     Rash and edema       PAST MEDICAL HISTORY:  Past Medical History:   Diagnosis Date     Benign non-nodular prostatic hyperplasia, presence of lower urinary tract symptoms unspecified 5/31/2016     Colonic polyps      Family history of colon cancer 4/4/2016    brother with colonoscopy      Hyperlipidemia LDL goal <100 5/3/2017     Hyperlipidemia LDL goal <130 12/8/2016     LUCAS (obstructive sleep apnea) 4/4/2016     Rash 4/4/2016       PAST SURGICAL HISTORY:  Past Surgical History:   Procedure Laterality Date     ARTHROSCOPY KNEE WITH MEDIAL MENISCECTOMY  6/6/2014    Procedure: ARTHROSCOPY KNEE WITH MEDIAL MENISCECTOMY;  Surgeon: Ramana Dominguez DO;  Location: [facility]     COLONOSCOPY  11/29/2011    Polypectomy.     COLONOSCOPY N/A 11/12/2014    Procedure: COLONOSCOPY;  Surgeon: Brooks Burris MD;  Location:  GI     HC COLONOSCOPY W BIOPSY  11/24/08       "COLONOSCOPY W/WO BRUSH/WASH  11/21/2005    Polypectomy.     HC VASECTOMY UNILAT/BILAT W POSTOP SEMEN  1977    Vasectomy       FAMILY HISTORY:  Family History   Problem Relation Age of Onset     CANCER Mother      Lung Cancer     Cancer - colorectal Father      Stomach Cancer     CANCER Brother        SOCIAL HISTORY:  Social History     Social History     Marital status:      Spouse name: Bia     Number of children: 2     Years of education: 16     Occupational History     Radiologic tech [employer]     Social History Main Topics     Smoking status: Never Smoker     Smokeless tobacco: Never Used     Alcohol use 0.0 oz/week     0 Standard drinks or equivalent per week      Comment: occasional     Drug use: No     Sexual activity: Yes     Partners: Female     Other Topics Concern      Service No     Blood Transfusions No     Caffeine Concern No     Occupational Exposure Yes     Radiation,     Hobby Hazards Yes     hunting     Sleep Concern Yes     CPAP     Stress Concern Yes     job     Weight Concern Yes     would like to lose      Special Diet No     Back Care No     Exercise No     Bike Helmet No     Seat Belt Yes     Self-Exams Yes     occassional     Parent/Sibling W/ Cabg, Mi Or Angioplasty Before 65f 55m? No     Social History Narrative       Review of Systems:  Skin:  Negative     Eyes:  Positive for glasses  ENT:  Negative    Respiratory:  Positive for dyspnea on exertion;sleep apnea  Cardiovascular:  Negative for;palpitations;chest pain;edema;lightheadedness;dizziness;fatigue    Gastroenterology: Negative    Genitourinary:  Negative    Musculoskeletal:  Positive for arthritis  Neurologic:  Negative    Psychiatric:  Negative    Heme/Lymph/Imm:  Positive for allergies  Endocrine:  Negative      Physical Exam:  Vitals: /72 (BP Location: Left arm, Patient Position: Fowlers, Cuff Size: Adult Large)  Pulse 68  Ht 1.727 m (5' 8\")  Wt 129.3 kg (285 lb 1.6 oz)  SpO2 98%  BMI " 43.35 kg/m2    Constitutional:  cooperative, alert and oriented, well developed, well nourished, in no acute distress obese      Skin:  warm and dry to the touch        Head:  normocephalic        Eyes:  no xanthalasma        ENT:  no pallor or cyanosis        Neck:  carotid pulses are full and equal bilaterally, JVP normal, no carotid bruit        Chest:  normal breath sounds, clear to auscultation, normal A-P diameter, normal symmetry, normal respiratory excursion, no use of accessory muscles        Cardiac: regular rhythm, normal S1/S2, no S3 or S4, apical impulse not displaced, no murmurs, gallops or rubs   distant heart sounds              Abdomen:  abdomen soft, non-tender, BS normoactive, no mass, no HSM, no bruits        Vascular: pulses full and equal                                      Extremities and Back:  no edema        Neurological:  no gross motor deficits          Recent Lab Results:  LIPID RESULTS:  Lab Results   Component Value Date    CHOL 163 12/08/2016    HDL 51 12/08/2016    LDL 81 05/02/2017    LDL 75 12/08/2016    TRIG 185 (H) 12/08/2016    CHOLHDLRATIO 5.0 11/03/2014       LIVER ENZYME RESULTS:  Lab Results   Component Value Date    AST 12 05/02/2017    ALT 28 05/02/2017       CBC RESULTS:  Lab Results   Component Value Date    WBC 12.0 (H) 05/02/2017    RBC 4.74 05/02/2017    HGB 15.0 05/02/2017    HCT 42.3 05/02/2017    MCV 89 05/02/2017    MCH 31.6 05/02/2017    MCHC 35.5 05/02/2017    RDW 11.9 05/02/2017     05/02/2017       BMP RESULTS:  Lab Results   Component Value Date     05/02/2017    POTASSIUM 3.7 05/02/2017    CHLORIDE 102 05/02/2017    CO2 24 05/02/2017    ANIONGAP 13 05/02/2017     (H) 05/02/2017    BUN 24 05/02/2017    CR 0.87 05/02/2017    GFRESTIMATED 87 05/02/2017    GFRESTBLACK >90   GFR Calc   05/02/2017    ALLEN 8.9 05/02/2017        A1C RESULTS:  Lab Results   Component Value Date    A1C 6.6 (H) 05/03/2017       INR RESULTS:  No  results found for: INR      Brad Mcginnis MD, FACC    CC  No referring provider defined for this encounter.

## 2018-01-19 ENCOUNTER — HEALTH MAINTENANCE LETTER (OUTPATIENT)
Age: 73
End: 2018-01-19

## 2018-02-12 ENCOUNTER — TELEPHONE (OUTPATIENT)
Dept: FAMILY MEDICINE | Facility: OTHER | Age: 73
End: 2018-02-12

## 2018-02-12 NOTE — TELEPHONE ENCOUNTER
Summary:    Patient is due/failing the following:   microalbumin, A1C and FOLLOW UP    Action needed:   Patient needs office visit for diabetic follow up. and Patient needs non-fasting lab only appointment    Type of outreach:    Sent letter.    Questions for provider review:    None                                                                                                                                    Kimmie Combs     Chart routed to Care Team .        Panel Management Review      Patient has the following on his problem list:     Diabetes    ASA: Passed    Last A1C  Lab Results   Component Value Date    A1C 6.6 05/03/2017     A1C tested: Passed    Last LDL:    Lab Results   Component Value Date    CHOL 163 12/08/2016     Lab Results   Component Value Date    HDL 51 12/08/2016     Lab Results   Component Value Date    LDL 81 05/02/2017    LDL 75 12/08/2016     Lab Results   Component Value Date    TRIG 185 12/08/2016     Lab Results   Component Value Date    CHOLHDLRATIO 5.0 11/03/2014     Lab Results   Component Value Date    NHDL 112 12/08/2016       Is the patient on a Statin? NO             Is the patient on Aspirin? YES    Medications     Salicylates    aspirin 81 MG tablet          Last three blood pressure readings:  BP Readings from Last 3 Encounters:   12/11/17 128/72   09/18/17 140/72   08/07/17 156/90            Tobacco History:     History   Smoking Status     Never Smoker   Smokeless Tobacco     Never Used         Hypertension   Last three blood pressure readings:  BP Readings from Last 3 Encounters:   12/11/17 128/72   09/18/17 140/72   08/07/17 156/90     Blood pressure: Passed    HTN Guidelines:  Age 18-59 BP range:  Less than 140/90  Age 60-85 with Diabetes:  Less than 140/90  Age 60-85 without Diabetes:  less than 150/90      Composite cancer screening  Chart review shows that this patient is due/due soon for the following Colonoscopy

## 2018-02-12 NOTE — LETTER
[facility]  [id number] [place] [id number]  Phone: [phone]  February 12, 2018      Ben Ceron  [address] [place] [address]      Dear Ben,    We care about your health and have reviewed your health plan including your medical conditions, medications, and lab results.  Based on this review, it is recommended that you follow up regarding the following health topic(s):  -Diabetes    We recommend you take the following action(s):  -schedule a FOLLOWUP OFFICE APPOINTMENT.  We will perform the following labs:  A1c and Microablumin.     Please call us at the [facility] - [phone] (or use [employer]) to address the above recommendations.     Thank you for trusting [facility] and we appreciate the opportunity to serve you.  We look forward to supporting your healthcare needs in the future.    Healthy Regards,    Your Health Care Team  [facility]

## 2018-04-27 ENCOUNTER — OFFICE VISIT (OUTPATIENT)
Dept: FAMILY MEDICINE | Facility: OTHER | Age: 73
End: 2018-04-27
Payer: COMMERCIAL

## 2018-04-27 VITALS
OXYGEN SATURATION: 96 % | TEMPERATURE: 97.9 F | DIASTOLIC BLOOD PRESSURE: 82 MMHG | HEART RATE: 81 BPM | BODY MASS INDEX: 43.04 KG/M2 | WEIGHT: 284 LBS | HEIGHT: 68 IN | SYSTOLIC BLOOD PRESSURE: 148 MMHG

## 2018-04-27 DIAGNOSIS — N40.1 BENIGN PROSTATIC HYPERPLASIA WITH URINARY FREQUENCY: Primary | ICD-10-CM

## 2018-04-27 DIAGNOSIS — I10 ESSENTIAL HYPERTENSION WITH GOAL BLOOD PRESSURE LESS THAN 140/90: ICD-10-CM

## 2018-04-27 DIAGNOSIS — E11.9 CONTROLLED TYPE 2 DIABETES MELLITUS WITHOUT COMPLICATION, WITHOUT LONG-TERM CURRENT USE OF INSULIN (H): ICD-10-CM

## 2018-04-27 DIAGNOSIS — Z12.5 SCREENING FOR PROSTATE CANCER: ICD-10-CM

## 2018-04-27 DIAGNOSIS — R35.0 BENIGN PROSTATIC HYPERPLASIA WITH URINARY FREQUENCY: Primary | ICD-10-CM

## 2018-04-27 DIAGNOSIS — E78.5 HYPERLIPIDEMIA LDL GOAL <130: ICD-10-CM

## 2018-04-27 DIAGNOSIS — I10 BENIGN ESSENTIAL HYPERTENSION: ICD-10-CM

## 2018-04-27 LAB
ANION GAP SERPL CALCULATED.3IONS-SCNC: 6 MMOL/L (ref 3–14)
BUN SERPL-MCNC: 18 MG/DL (ref 7–30)
CALCIUM SERPL-MCNC: 8.6 MG/DL (ref 8.5–10.1)
CHLORIDE SERPL-SCNC: 106 MMOL/L (ref 94–109)
CHOLEST SERPL-MCNC: 147 MG/DL
CO2 SERPL-SCNC: 28 MMOL/L (ref 20–32)
CREAT SERPL-MCNC: 1.01 MG/DL (ref 0.66–1.25)
CREAT UR-MCNC: 264 MG/DL
GFR SERPL CREATININE-BSD FRML MDRD: 73 ML/MIN/1.7M2
GLUCOSE SERPL-MCNC: 93 MG/DL (ref 70–99)
HBA1C MFR BLD: 5.8 % (ref 0–6.4)
HDLC SERPL-MCNC: 51 MG/DL
LDLC SERPL CALC-MCNC: 64 MG/DL
MICROALBUMIN UR-MCNC: 17 MG/L
MICROALBUMIN/CREAT UR: 6.59 MG/G CR (ref 0–17)
NONHDLC SERPL-MCNC: 96 MG/DL
POTASSIUM SERPL-SCNC: 3.8 MMOL/L (ref 3.4–5.3)
PSA SERPL-ACNC: 1.76 UG/L (ref 0–4)
SODIUM SERPL-SCNC: 140 MMOL/L (ref 133–144)
TRIGL SERPL-MCNC: 162 MG/DL

## 2018-04-27 PROCEDURE — 36415 COLL VENOUS BLD VENIPUNCTURE: CPT | Performed by: INTERNAL MEDICINE

## 2018-04-27 PROCEDURE — 80061 LIPID PANEL: CPT | Performed by: INTERNAL MEDICINE

## 2018-04-27 PROCEDURE — G0103 PSA SCREENING: HCPCS | Performed by: INTERNAL MEDICINE

## 2018-04-27 PROCEDURE — 99207 C FOOT EXAM  NO CHARGE: CPT | Performed by: INTERNAL MEDICINE

## 2018-04-27 PROCEDURE — 99214 OFFICE O/P EST MOD 30 MIN: CPT | Performed by: INTERNAL MEDICINE

## 2018-04-27 PROCEDURE — 80048 BASIC METABOLIC PNL TOTAL CA: CPT | Performed by: INTERNAL MEDICINE

## 2018-04-27 PROCEDURE — 83036 HEMOGLOBIN GLYCOSYLATED A1C: CPT | Performed by: INTERNAL MEDICINE

## 2018-04-27 PROCEDURE — 82043 UR ALBUMIN QUANTITATIVE: CPT | Performed by: INTERNAL MEDICINE

## 2018-04-27 RX ORDER — METOPROLOL SUCCINATE 100 MG/1
100 TABLET, EXTENDED RELEASE ORAL DAILY
Qty: 90 TABLET | Refills: 3 | Status: SHIPPED | OUTPATIENT
Start: 2018-04-27 | End: 2018-09-17

## 2018-04-27 RX ORDER — LOSARTAN POTASSIUM AND HYDROCHLOROTHIAZIDE 25; 100 MG/1; MG/1
1 TABLET ORAL DAILY
Qty: 90 TABLET | Refills: 3 | Status: SHIPPED | OUTPATIENT
Start: 2018-04-27 | End: 2018-09-17

## 2018-04-27 RX ORDER — FINASTERIDE 5 MG/1
5 TABLET, FILM COATED ORAL DAILY
Qty: 90 TABLET | Refills: 3 | Status: SHIPPED | OUTPATIENT
Start: 2018-04-27 | End: 2019-04-22

## 2018-04-27 RX ORDER — EZETIMIBE 10 MG/1
10 TABLET ORAL DAILY
Qty: 90 TABLET | Refills: 3 | Status: SHIPPED | OUTPATIENT
Start: 2018-04-27 | End: 2019-05-08

## 2018-04-27 ASSESSMENT — PAIN SCALES - GENERAL: PAINLEVEL: NO PAIN (0)

## 2018-04-27 NOTE — MR AVS SNAPSHOT
"              After Visit Summary   4/27/2018    Ben Ceron    MRN: [id number]           Patient Information     Date Of Birth          1945        Visit Information        Provider Department      4/27/2018 1:00 PM Mina Walters DO [facility]        Today's Diagnoses     Benign prostatic hyperplasia with urinary frequency    -  1    Essential hypertension with goal blood pressure less than 140/90        Controlled type 2 diabetes mellitus without complication, without long-term current use of insulin (H)        Benign essential hypertension        Hyperlipidemia LDL goal <130        Screening for prostate cancer           Follow-ups after your visit        Follow-up notes from your care team     Return in about 6 months (around 10/27/2018).      Who to contact     If you have questions or need follow up information about today's clinic visit or your schedule please contact [facility] directly at [phone].  Normal or non-critical lab and imaging results will be communicated to you by Ariistohart, letter or phone within 4 business days after the clinic has received the results. If you do not hear from us within 7 days, please contact the clinic through Ariistohart or phone. If you have a critical or abnormal lab result, we will notify you by phone as soon as possible.  Submit refill requests through Tomfoolery or call your pharmacy and they will forward the refill request to us. Please allow 3 business days for your refill to be completed.          Additional Information About Your Visit        MyChart Information     Tomfoolery lets you send messages to your doctor, view your test results, renew your prescriptions, schedule appointments and more. To sign up, go to [email or web address] . Click on \"Log in\" on the left side of the screen, which will take you to the Welcome page. Then click on \"Sign up Now\" on the right side of the page.     You will be asked to enter the access " "code listed below, as well as some personal information. Please follow the directions to create your username and password.     Your access code is: [email or web address]  Expires: 2018  8:11 PM     Your access code will  in 90 days. If you need help or a new code, please call your [facility] clinic or [phone].        Care EveryWhere ID     This is your Care EveryWhere ID. This could be used by other organizations to access your [facility] medical records  [id number]        Your Vitals Were     Pulse Temperature Height Pulse Oximetry BMI (Body Mass Index)       81 97.9  F (36.6  C) (Temporal) 5' 8\" (1.727 m) 96% 43.18 kg/m2        Blood Pressure from Last 3 Encounters:   18 148/82   17 128/72   17 140/72    Weight from Last 3 Encounters:   18 284 lb (128.8 kg)   17 285 lb 1.6 oz (129.3 kg)   17 286 lb 11.2 oz (130 kg)              We Performed the Following     Albumin Random Urine Quantitative with Creat Ratio     Basic metabolic panel  (Ca, Cl, CO2, Creat, Gluc, K, Na, BUN)     FOOT EXAM     Hemoglobin A1c     Lipid panel reflex to direct LDL Fasting     PSA, screen          Today's Medication Changes          These changes are accurate as of 18 11:59 PM.  If you have any questions, ask your nurse or doctor.               These medicines have changed or have updated prescriptions.        Dose/Directions    losartan-hydrochlorothiazide 100-25 MG per tablet   Commonly known as:  HYZAAR   This may have changed:    - how much to take  - how to take this  - when to take this  - additional instructions   Used for:  Essential hypertension with goal blood pressure less than 140/90   Changed by:  Mina Walters,         Dose:  1 tablet   Take 1 tablet by mouth daily   Quantity:  90 tablet   Refills:  3       metFORMIN 500 MG tablet   Commonly known as:  GLUCOPHAGE   This may have changed:  See the new instructions.   Used for:  Controlled type 2 diabetes mellitus " without complication, without long-term current use of insulin (H)   Changed by:  Mina Walters DO        Dose:  500 mg   Take 1 tablet (500 mg) by mouth 2 times daily (with meals)   Quantity:  180 tablet   Refills:  3            Where to get your medicines      These medications were sent to [facility] [address] [place], [place] - [address]  [address], [place] [address]     Phone:  [phone]     ezetimibe 10 MG tablet    finasteride 5 MG tablet    losartan-hydrochlorothiazide 100-25 MG per tablet    metFORMIN 500 MG tablet    metoprolol succinate 100 MG 24 hr tablet    NIFEdipine ER 60 MG 24 hr tablet                Primary Care Provider    None Specified       No primary provider on file.        Equal Access to Services     REMY SANTAMARIA : Tyrell Elizabeth, loco ballard, olivia george, blanche liz. [facility] [phone].    ATENCIÓN: Si habla español, tiene a morales disposición servicios gratuitos de asistencia lingüística. Llame al [phone].    We comply with applicable federal civil rights laws and [place] laws. We do not discriminate on the basis of race, color, national origin, age, disability, sex, sexual orientation, or gender identity.            Thank you!     Thank you for choosing [facility]  for your care. Our goal is always to provide you with excellent care. Hearing back from our patients is one way we can continue to improve our services. Please take a few minutes to complete the written survey that you may receive in the mail after your visit with us. Thank you!             Your Updated Medication List - Protect others around you: Learn how to safely use, store and throw away your medicines at www.disposemymeds.org.          This list is accurate as of 4/27/18 11:59 PM.  Always use your most recent med list.                   Brand Name Dispense Instructions for use Diagnosis    ADVIL 200 MG tablet    Generic drug:  ibuprofen     120    Reported on 5/2/2017        aspirin 81 MG tablet     30 tablet    Take by mouth daily        clobetasol 0.05 % cream    TEMOVATE    60 g    Small amount to affected areas BID    Yeast dermatitis       desoximetasone 0.25 % cream    TOPICORT    100 g    use as directed to legs    Rash       ezetimibe 10 MG tablet    ZETIA    90 tablet    Take 1 tablet (10 mg) by mouth daily    Hyperlipidemia LDL goal <130       finasteride 5 MG tablet    PROSCAR    90 tablet    Take 1 tablet (5 mg) by mouth daily    Benign prostatic hyperplasia with urinary frequency       losartan-hydrochlorothiazide 100-25 MG per tablet    HYZAAR    90 tablet    Take 1 tablet by mouth daily    Essential hypertension with goal blood pressure less than 140/90       metFORMIN 500 MG tablet    GLUCOPHAGE    180 tablet    Take 1 tablet (500 mg) by mouth 2 times daily (with meals)    Controlled type 2 diabetes mellitus without complication, without long-term current use of insulin (H)       metoprolol succinate 100 MG 24 hr tablet    TOPROL-XL    90 tablet    Take 1 tablet (100 mg) by mouth daily    Essential hypertension with goal blood pressure less than 140/90       NIFEdipine ER 60 MG 24 hr tablet    ADALAT CC    90 tablet    Take 1 tablet (60 mg) by mouth daily    Benign essential hypertension       order for DME     1 Units    Equipment being ordered: CPAP and related hardware, mask and tubing as well heated humidity equipment.    LUCAS (obstructive sleep apnea), Morbid obesity with BMI of 45.0-49.9, adult (H)       TYLENOL PO      As needed        vitamin D 2000 units tablet      Take 1 tablet by mouth daily.        vitamin E 400 UNIT capsule

## 2018-04-27 NOTE — LETTER
May 4, 2018      Ben Ceron  [address] [place] [facility] [address]        Dear ,    We are writing to inform you of your test results.    The microalbumin is normal.   The chemistry panel including the kidney function is normal.  Cholesterol is well controlled with LDL of 64.   Prostate antigen is normal.   Glycosylated hemoglobin is down from 6.6 down to 5.8 which suggests excellent blood sugar control.     Resulted Orders   Albumin Random Urine Quantitative with Creat Ratio   Result Value Ref Range    Creatinine Urine 264 mg/dL    Albumin Urine mg/L 17 mg/L    Albumin Urine mg/g Cr 6.59 0 - 17 mg/g Cr   Hemoglobin A1c   Result Value Ref Range    Hemoglobin A1C 5.8 0 - 6.4 %      Comment:      Normal <5.7% Prediabetes 5.7-6.4%  Diabetes 6.5% or higher - adopted from ADA   consensus guidelines.     Basic metabolic panel  (Ca, Cl, CO2, Creat, Gluc, K, Na, BUN)   Result Value Ref Range    Sodium 140 133 - 144 mmol/L    Potassium 3.8 3.4 - 5.3 mmol/L    Chloride 106 94 - 109 mmol/L    Carbon Dioxide 28 20 - 32 mmol/L    Anion Gap 6 3 - 14 mmol/L    Glucose 93 70 - 99 mg/dL    Urea Nitrogen 18 7 - 30 mg/dL    Creatinine 1.01 0.66 - 1.25 mg/dL    GFR Estimate 73 >60 mL/min/1.7m2      Comment:      Non  GFR Calc    GFR Estimate If Black 88 >60 mL/min/1.7m2      Comment:       GFR Calc    Calcium 8.6 8.5 - 10.1 mg/dL   Lipid panel reflex to direct LDL Fasting   Result Value Ref Range    Cholesterol 147 <200 mg/dL    Triglycerides 162 (H) <150 mg/dL      Comment:      Borderline high:  150-199 mg/dl  High:             200-499 mg/dl  Very high:       >499 mg/dl      HDL Cholesterol 51 >39 mg/dL    LDL Cholesterol Calculated 64 <100 mg/dL      Comment:      Desirable:       <100 mg/dl    Non HDL Cholesterol 96 <130 mg/dL   PSA, screen   Result Value Ref Range    PSA 1.76 0 - 4 ug/L      Comment:      Assay Method:  Chemiluminescence using Siemens Vista analyzer       If you have any  questions or concerns, please call the clinic at the number listed above.       Sincerely,        Mina Walters, DO

## 2018-04-27 NOTE — PROGRESS NOTES
SUBJECTIVE:   Ben Ceron is a 72 year old male who presents to clinic today for the following health issues:    New Patient/Transfer of Care      Problem list and histories reviewed & adjusted, as indicated.  Additional history: as documented                     Chief Complaint    The patient is a pleasant 72-year-old gentleman who presents today for follow-up of his type 2 diabetes and hypertension.  He generally miller in [place] where he gets his medications from [place].  He notes that he has been doing quite well on the medication.  He does not check his blood sugars at home but has had no symptoms of polyuria or polydipsia.  He has had no acute vision changes.  He knows that he is due for an ocular examination will have that done in the near future.  He is on Zetia because he demonstrated significant intolerance to statin medications on multiple occasions.  He had significant cellulitis and stasis changes of lower extremities.  Each time, this resolved with discontinuation of the statin.                         PAST, FAMILY,SOCIAL HISTORY:     Medical  History:   has a past medical history of Benign non-nodular prostatic hyperplasia, presence of lower urinary tract symptoms unspecified (5/31/2016); Colonic polyps; Family history of colon cancer (4/4/2016); Hyperlipidemia LDL goal <100 (5/3/2017); Hyperlipidemia LDL goal <130 (12/8/2016); LUCAS (obstructive sleep apnea) (4/4/2016); and Rash (4/4/2016).     Surgical History:   has a past surgical history that includes VASECTOMY UNILAT/BILAT W POSTOP SEMEN (1977); Colonoscopy w/wo [place] **Performed** (11/21/2005); COLONOSCOPY W BIOPSY (11/24/08); colonoscopy (11/29/2011); Arthroscopy knee with medial meniscectomy (6/6/2014); and Colonoscopy (N/A, 11/12/2014).     Social History:   reports that he has never smoked. He has never used smokeless tobacco. He reports that he drinks alcohol. He reports that he does not use illicit drugs.     Family History:  family  history includes CANCER in his brother and mother; Cancer - colorectal in his father.            MEDICATIONS  Current Outpatient Prescriptions   Medication Sig Dispense Refill     Acetaminophen (TYLENOL PO) As needed       ADVIL 200 MG OR TABS Reported on 5/2/2017 120 0     aspirin 81 MG tablet Take by mouth daily 30 tablet      Cholecalciferol (VITAMIN D) 2000 UNITS tablet Take 1 tablet by mouth daily.       clobetasol (TEMOVATE) 0.05 % cream Small amount to affected areas BID 60 g 1     desoximetasone (TOPICORT) 0.25 % cream use as directed to legs 100 g 1     ezetimibe (ZETIA) 10 MG tablet Take 1 tablet (10 mg) by mouth daily 90 tablet 3     finasteride (PROSCAR) 5 MG tablet Take 1 tablet (5 mg) by mouth daily 90 tablet 3     losartan-hydrochlorothiazide (HYZAAR) 100-25 MG per tablet Take 1 tablet by mouth daily 90 tablet 3     metFORMIN (GLUCOPHAGE) 500 MG tablet Take 1 tablet (500 mg) by mouth 2 times daily (with meals) 180 tablet 3     metoprolol succinate (TOPROL-XL) 100 MG 24 hr tablet Take 1 tablet (100 mg) by mouth daily 90 tablet 3     NIFEdipine ER (ADALAT CC) 60 MG 24 hr tablet Take 1 tablet (60 mg) by mouth daily 90 tablet 3     order for DME Equipment being ordered: CPAP and related hardware, mask and tubing as well heated humidity equipment. 1 Units 0     VITAMIN E 400 UNIT OR CAPS   0     [DISCONTINUED] ezetimibe (ZETIA) 10 MG tablet Take 1 tablet (10 mg) by mouth daily 30 tablet 6     [DISCONTINUED] losartan-hydrochlorothiazide (HYZAAR) 100-25 MG per tablet TAKE ONE TABLET BY MOUTH ONCE DAILY FOLLOW  UP  BLOOD  PRESSURE  RECHECK  FOR  FURTHER  REFILLS 90 tablet 3     [DISCONTINUED] metFORMIN (GLUCOPHAGE) 500 MG tablet TAKE ONE TABLET BY MOUTH TWICE DAILY WITH MEALS 180 tablet 1     [DISCONTINUED] metoprolol (TOPROL-XL) 100 MG 24 hr tablet Take 1 tablet (100 mg) by mouth daily 90 tablet 1     [DISCONTINUED] NIFEdipine ER (ADALAT CC) 60 MG 24 hr tablet Take 1 tablet (60 mg) by mouth daily 90 tablet 3  "        --------------------------------------------------------------------------------------------------------------------                              Review of Systems     LUNGS: Pt denies: cough,excess sputum, hemoptysis, or shortness of breath.   HEART: Pt denies: chest pain, arrythmia, syncope, tachy or bradyarrhythmia.   GI: Pt denies: nausea, vomitting, diarrhea, constipation, melena, or hematochezia.  NEURO: Pt denies: seizures, strokes, diplopia, weakness, paraesthesias, or paralysis.   SKIN: Pt denies: itching, rashes, discoloration, or specific lesions of concern. Denies recent hair loss.   PSYCH: The patient denies significant depression, anxiety, mood imbalance. Specifically denies any suicidal ideation.   URINARY: Pt denies: frequency, urgency, incontinence, or hesitancy of urine. Denies hematuria, or flank pain.  These symptoms were all present prior to starting the Proscar.                                       Examination      /82 (BP Location: Left arm, Patient Position: Chair, Cuff Size: Adult Regular)  Pulse 81  Temp 97.9  F (36.6  C) (Temporal)  Ht 5' 8\" (1.727 m)  Wt 284 lb (128.8 kg)  SpO2 96%  BMI 43.18 kg/m2   Constitutional: The patient appears to be in no acute distress. The patient appears to be adequately hydrated. No acute respiratory or hemodynamic distress is noted at this time.   LUNGS: clear bilaterally, airflow is brisk, no intercostal retraction or stridor is noted. No coughing is noted during visit.   GI: Abdomen is soft, without rebound, guarding or tenderness. Bowel sounds are appropriate. No renal bruits are heard. Abdomen is moderately obese.   NEURO: Pt is alert and appropriate. No neurologic lateralization is noted. Cranial nerves 2-12 are intact. Peripheral sensory and motor function are grossly normal.    SKIN:  warm and dry. No erythema, or rashes are noted. No specific lesions of concern are noted.    PSYCH: The patient appears grossly appropriate. " Maintains good eye contact, does not have any jittery or atypical motion. Displays appropriate affect.                                          Decision Making    1. Essential hypertension with goal blood pressure less than 140/90  Continue current medication  Check electrolytes and renal function    - losartan-hydrochlorothiazide (HYZAAR) 100-25 MG per tablet; Take 1 tablet by mouth daily  Dispense: 90 tablet; Refill: 3  - metoprolol succinate (TOPROL-XL) 100 MG 24 hr tablet; Take 1 tablet (100 mg) by mouth daily  Dispense: 90 tablet; Refill: 3    2. Controlled type 2 diabetes mellitus without complication, without long-term current use of insulin (H)  Check A1c, renal function.  Continue medication and adjust accordingly    - Albumin Random Urine Quantitative with Creat Ratio  - metFORMIN (GLUCOPHAGE) 500 MG tablet; Take 1 tablet (500 mg) by mouth 2 times daily (with meals)  Dispense: 180 tablet; Refill: 3  - Hemoglobin A1c  - FOOT EXAM  - Basic metabolic panel  (Ca, Cl, CO2, Creat, Gluc, K, Na, BUN)    3. Benign essential hypertension  This appears to be somewhat redundant but I left it in because I did not want to remember all of the subsequent impressions  - NIFEdipine ER (ADALAT CC) 60 MG 24 hr tablet; Take 1 tablet (60 mg) by mouth daily  Dispense: 90 tablet; Refill: 3    4. Hyperlipidemia LDL goal <130  Continue Zetia, check renal function, liver function and cholesterol  - ezetimibe (ZETIA) 10 MG tablet; Take 1 tablet (10 mg) by mouth daily  Dispense: 90 tablet; Refill: 3  - Lipid panel reflex to direct LDL Fasting    5. Benign prostatic hyperplasia with urinary frequency  Controlled with medication.  - finasteride (PROSCAR) 5 MG tablet; Take 1 tablet (5 mg) by mouth daily  Dispense: 90 tablet; Refill: 3                              FOLLOW UP   I have asked the patient to make an appointment for followup with me in 6 months or as predicated by his lab results.            I have carefully explained the  diagnosis and treatment options to the patient.  The patient has displayed an understanding of the above, and all subsequent questions were answered.      DO ELIZABETH Tran    Portions of this note were produced using [employer]  Although every attempt at real-time proof reading has been made, occasional grammar/syntax errors may have been missed.

## 2018-04-27 NOTE — NURSING NOTE
"Chief Complaint   Patient presents with     Establish Care       Initial /82 (BP Location: Left arm, Patient Position: Chair, Cuff Size: Adult Regular)  Pulse 81  Temp 97.9  F (36.6  C) (Temporal)  Ht 5' 8\" (1.727 m)  Wt 284 lb (128.8 kg)  SpO2 96%  BMI 43.18 kg/m2 Estimated body mass index is 43.18 kg/(m^2) as calculated from the following:    Height as of this encounter: 5' 8\" (1.727 m).    Weight as of this encounter: 284 lb (128.8 kg).  Medication Reconciliation: complete  Dipti Smith MA    "

## 2018-05-04 NOTE — PROGRESS NOTES
Please contact the patient and notify him of the following:  The microalbumin is normal.  The chemistry panel including the kidney function is normal.  Cholesterol is well controlled with LDL of 64.  Prostate antigen is normal.  Glycosylated hemoglobin is down from 6.6 down to 5.8 which suggests excellent blood sugar control.    Thank you.   DO ELIZABETH Tran

## 2018-05-10 ENCOUNTER — TELEPHONE (OUTPATIENT)
Dept: FAMILY MEDICINE | Facility: OTHER | Age: 73
End: 2018-05-10

## 2018-05-10 NOTE — LETTER
[facility]  [address] [facility] [address]  Phone: [phone]  May 10, 2018      Ben Ceron  [address] [facility] [address]      Dear Ben,    We care about your health and have reviewed your health plan including your medical conditions, medications, and lab results.  Based on this review, it is recommended that you follow up regarding the following health topic(s):  -High Blood Pressure    We recommend you take the following action(s):  -schedule a FREE FLOAT MA-ONLY BLOOD PRESSURE APPOINTMENT within the next 1-4 weeks.     Please call us at [phone]  (or use [facility]) to address the above recommendations.     Thank you for trusting [facility] and we appreciate the opportunity to serve you.  We look forward to supporting your healthcare needs in the future.    Healthy Regards,    Your Health Care Team  [facility]

## 2018-05-10 NOTE — TELEPHONE ENCOUNTER
Summary:    Patient is due/failing the following:   BP CHECK    Action needed:   Patient needs nurse only appointment.    Type of outreach:    Sent letter.    Questions for provider review:    None                                                                                                                                    Kimmie Combs       Chart routed to Care Team .        Panel Management Review      Patient has the following on his problem list:     Hypertension   Last three blood pressure readings:  BP Readings from Last 3 Encounters:   04/27/18 148/82   12/11/17 128/72   09/18/17 140/72     Blood pressure: FAILED    HTN Guidelines:  Age 18-59 BP range:  Less than 140/90  Age 60-85 with Diabetes:  Less than 140/90  Age 60-85 without Diabetes:  less than 150/90      Composite cancer screening  Chart review shows that this patient is due/due soon for the following None

## 2018-07-19 ENCOUNTER — TELEPHONE (OUTPATIENT)
Dept: FAMILY MEDICINE | Facility: OTHER | Age: 73
End: 2018-07-19

## 2018-07-19 NOTE — TELEPHONE ENCOUNTER
Reason for Call:  Same Day Appointment, Requested Provider:  Any [place] Provider    PCP: No primary care provider on file.    Reason for visit: right knee injury 7/18, is swollen and painful    Duration of symptoms: 2nd day    Have you been treated for this in the past? No    Additional comments: walk in patient, is currently in building    Can we leave a detailed message on this number? Not Applicable    Phone number patient can be reached at: Other phone number:  Pt is in building*    Best Time: as soon as possible    Call taken on 7/19/2018 at 1:55 PM by Lauren Stanley

## 2018-07-20 ENCOUNTER — OFFICE VISIT (OUTPATIENT)
Dept: FAMILY MEDICINE | Facility: OTHER | Age: 73
End: 2018-07-20
Payer: COMMERCIAL

## 2018-07-20 VITALS
DIASTOLIC BLOOD PRESSURE: 80 MMHG | OXYGEN SATURATION: 98 % | SYSTOLIC BLOOD PRESSURE: 138 MMHG | WEIGHT: 293.2 LBS | RESPIRATION RATE: 20 BRPM | TEMPERATURE: 97.4 F | BODY MASS INDEX: 44.58 KG/M2 | HEART RATE: 76 BPM

## 2018-07-20 DIAGNOSIS — S83.241A ACUTE MEDIAL MENISCUS TEAR OF RIGHT KNEE, INITIAL ENCOUNTER: Primary | ICD-10-CM

## 2018-07-20 PROCEDURE — 99213 OFFICE O/P EST LOW 20 MIN: CPT | Performed by: PHYSICIAN ASSISTANT

## 2018-07-20 ASSESSMENT — PAIN SCALES - GENERAL: PAINLEVEL: EXTREME PAIN (8)

## 2018-07-20 NOTE — PROGRESS NOTES
SUBJECTIVE:   Ben Ceron is a 72 year old male who presents to clinic today for the following health issues:      Joint Pain    Onset: 7/18    Description:   Location: right knee  Character: Sharp, Dull ache and Burning    Intensity: severe    Progression of Symptoms: worse    Accompanying Signs & Symptoms:  Other symptoms: warmth and swelling    History:   Previous similar pain: YES- left knee       Precipitating factors:   Trauma or overuse: nothing that he can remember. Was golfing on Wednesday       Therapies Tried and outcome: Rest and ice has helped some     Patient is a 72 year old male who is in today with right knee pain. He reports that the pain started earlier this week following 18 holes of golfing with peers. 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 Denies twisting injury or trauma to the knee, fever, radiation of pain, numbness in distal extremity or redness/warmth to the joint. Patient is having to ambulate with the aid of a cane. This is atypical for him.     Problem list and histories reviewed & adjusted, as indicated.  Additional history: as documented    Patient Active Problem List   Diagnosis     Advance Care Planning     Essential hypertension with goal blood pressure less than 140/90     Morbid obesity with BMI of 45.0-49.9, adult (H)     Family history of colon cancer     Rash     LUCAS (obstructive sleep apnea)     Benign non-nodular prostatic hyperplasia, presence of lower urinary tract symptoms unspecified     Primary osteoarthritis of left knee     Hyperlipidemia LDL goal <100     Benign prostatic hyperplasia with urinary frequency     Past Surgical History:   Procedure Laterality Date     ARTHROSCOPY KNEE WITH MEDIAL MENISCECTOMY  6/6/2014    Procedure: ARTHROSCOPY KNEE WITH MEDIAL MENISCECTOMY;  Surgeon: Ramana Dominguez DO;  Location: [facility] OR     COLONOSCOPY  11/29/2011     Polypectomy.     COLONOSCOPY N/A 11/12/2014    Procedure: COLONOSCOPY;  Surgeon: Brooks Burris MD;  Location: [facility] GI     HC COLONOSCOPY W BIOPSY  11/24/08     HC COLONOSCOPY W/WO BRUSH/WASH  11/21/2005    Polypectomy.     HC VASECTOMY UNILAT/BILAT W POSTOP SEMEN  1977    Vasectomy       Social History   Substance Use Topics     Smoking status: Never Smoker     Smokeless tobacco: Never Used     Alcohol use 0.0 oz/week     0 Standard drinks or equivalent per week      Comment: occasional     Family History   Problem Relation Age of Onset     Cancer Mother      Lung Cancer     Cancer - colorectal Father      Stomach Cancer     Cancer Brother          Current Outpatient Prescriptions   Medication Sig Dispense Refill     Acetaminophen (TYLENOL PO) As needed       aspirin 81 MG tablet Take by mouth daily 30 tablet      Cholecalciferol (VITAMIN D) 2000 UNITS tablet Take 1 tablet by mouth daily.       clobetasol (TEMOVATE) 0.05 % cream Small amount to affected areas BID 60 g 1     desoximetasone (TOPICORT) 0.25 % cream use as directed to legs 100 g 1     ezetimibe (ZETIA) 10 MG tablet Take 1 tablet (10 mg) by mouth daily 90 tablet 3     finasteride (PROSCAR) 5 MG tablet Take 1 tablet (5 mg) by mouth daily 90 tablet 3     losartan-hydrochlorothiazide (HYZAAR) 100-25 MG per tablet Take 1 tablet by mouth daily 90 tablet 3     metFORMIN (GLUCOPHAGE) 500 MG tablet Take 1 tablet (500 mg) by mouth 2 times daily (with meals) 180 tablet 3     metoprolol succinate (TOPROL-XL) 100 MG 24 hr tablet Take 1 tablet (100 mg) by mouth daily 90 tablet 3     NIFEdipine ER (ADALAT CC) 60 MG 24 hr tablet Take 1 tablet (60 mg) by mouth daily 90 tablet 3     order for DME Equipment being ordered: CPAP and related hardware, mask and tubing as well heated humidity equipment. 1 Units 0     VITAMIN E 400 UNIT OR CAPS   0     ADVIL 200 MG OR TABS Reported on 5/2/2017 120 0     Allergies   Allergen Reactions     Norvasc [Amlodipine] Muscle Pain  (Myalgia)     Simvastatin Rash     Rash and edema     Labs reviewed in EPIC    Reviewed and updated as needed this visit by clinical staff  Tobacco  Allergies  Meds  Med Hx  Surg Hx  Fam Hx  Soc Hx      Reviewed and updated as needed this visit by Provider         ROS:  Constitutional, HEENT, cardiovascular, pulmonary, gi and gu systems are negative, except as otherwise noted.    OBJECTIVE:     /80 (BP Location: Right arm, Patient Position: Sitting, Cuff Size: Adult Large)  Pulse 76  Temp 97.4  F (36.3  C) (Tympanic)  Resp 20  Wt 293 lb 3.2 oz (133 kg)  SpO2 98%  BMI 44.58 kg/m2  Body mass index is 44.58 kg/(m^2).  GENERAL: healthy, alert and no distress  MS: no gross musculoskeletal defects noted, no edema. Tenderness along right medial joint line. Neg anterior/posterior drawer, normal varus/valgus stress, positive karen with click palpated over right medial joint. Mild edema, no erythema, cool to the touch.   SKIN: no suspicious lesions or rashes  NEURO: Normal strength and tone, mentation intact and speech normal  PSYCH: mentation appears normal, affect normal/bright    Diagnostic Test Results:  none     ASSESSMENT/PLAN:       1. Acute medial meniscus tear of right knee, initial encounter  Patient history and exam findings suspicious for right medial meniscal injury. Order for MRI and orthopedic consult placed. Patient would prefer to see Dr. Dominguez as this is who treated his left knee.   - MR Knee Right w/o & w Contrast; Future  - ORTHOPEDICS ADULT REFERRAL    Follow up with clinic as needed or sooner if conditions change, worsen or fail to improve as expected.      Chad Tran PA-C  [facility]

## 2018-07-20 NOTE — MR AVS SNAPSHOT
After Visit Summary   7/20/2018    Ben Ceron    MRN: [id number]           Patient Information     Date Of Birth          1945        Visit Information        Provider Department      7/20/2018 10:00 AM Chad Tran PA-C [facility]        Today's Diagnoses     Acute medial meniscus tear of right knee, initial encounter    -  1       Follow-ups after your visit        Additional Services     ORTHOPEDICS ADULT REFERRAL       Your provider has referred you to: [facility]: [facility] [phone]   [email or web address]    Prefers Dr. Dominguez    Please be aware that coverage of these services is subject to the terms and limitations of your health insurance plan.  Call member services at your health plan with any benefit or coverage questions.      Please bring the following to your appointment:    >>   Any x-rays, CTs or MRIs which have been performed.  Contact the facility where they were done to arrange for  prior to your scheduled appointment.    >>   List of current medications   >>   This referral request   >>   Any documents/labs given to you for this referral                  Your next 10 appointments already scheduled     Jul 21, 2018 11:00 AM CDT   MR KNEE RIGHT W/O & W CONTRAST with PHMR1   [facility] [place] [facility] ([facility])    [address] [place] [id number] [phone]           Take your medicines as usual, unless your doctor tells you not to. Bring a list of your current medicines to your exam (including vitamins, minerals and over-the-counter drugs).  You may or may not receive intravenous (IV) contrast for this exam pending the discretion of the Radiologist.  You do not need to do anything special to prepare.  The MRI machine uses a strong magnet. Please wear clothes without metal (snaps, zippers). A sweatsuit works well, or we may give you a hospital gown.  Please remove any  body piercings and hair extensions before you arrive. You will also remove watches, jewelry, hairpins, wallets, dentures, partial dental plates and hearing aids. You may wear contact lenses, and you may be able to wear your rings. We have a safe place to keep your personal items, but it is safer to leave them at home.  **IMPORTANT** THE INSTRUCTIONS BELOW ARE ONLY FOR THOSE PATIENTS WHO HAVE BEEN PRESCRIBED SEDATION OR GENERAL ANESTHESIA DURING THEIR MRI PROCEDURE:  IF YOUR DOCTOR PRESCRIBED ORAL SEDATION (take medicine to help you relax during your exam):   You must get the medicine from your doctor (oral medication) before you arrive. Bring the medicine to the exam. Do not take it at home. You ll be told when to take it upon arriving for your exam.   Arrive one hour early. Bring someone who can take you home after the test. Your medicine will make you sleepy. After the exam, you may not drive, take a bus or take a taxi by yourself.  IF YOUR DOCTOR PRESCRIBED IV SEDATION:   Arrive one hour early. Bring someone who can take you home after the test. Your medicine will make you sleepy. After the exam, you may not drive, take a bus or take a taxi by yourself.   No eating 6 hours before your exam. You may have clear liquids up until 4 hours before your exam. (Clear liquids include water, clear tea, black coffee and fruit juice without pulp.)  IF YOUR DOCTOR PRESCRIBED ANESTHESIA (be asleep for your exam):   Arrive 1 1/2 hours early. Bring someone who can take you home after the test. You may not drive, take a bus or take a taxi by yourself.   No eating 8 hours before your exam. You may have clear liquids up until 4 hours before your exam. (Clear liquids include water, clear tea, black coffee and fruit juice without pulp.)   You will spend four to five hours in the recovery room.  Please call the Imaging Department at your exam site with any questions.              Future tests that were ordered for you today     Open  Future Orders        Priority Expected Expires Ordered    MR Knee Right w/o & w Contrast Routine  7/20/2019 7/20/2018            Who to contact     If you have questions or need follow up information about today's clinic visit or your schedule please contact [facility] directly at [phone].  Normal or non-critical lab and imaging results will be communicated to you by MyChart, letter or phone within 4 business days after the clinic has received the results. If you do not hear from us within 7 days, please contact the clinic through MyChart or phone. If you have a critical or abnormal lab result, we will notify you by phone as soon as possible.  Submit refill requests through [employer] or call your pharmacy and they will forward the refill request to us. Please allow 3 business days for your refill to be completed.          Additional Information About Your Visit        Care EveryWhere ID     This is your Care EveryWhere ID. This could be used by other organizations to access your [place] medical records  [id number]        Your Vitals Were     Pulse Temperature Respirations Pulse Oximetry BMI (Body Mass Index)       76 97.4  F (36.3  C) (Tympanic) 20 98% 44.58 kg/m2        Blood Pressure from Last 3 Encounters:   07/20/18 138/80   04/27/18 148/82   12/11/17 128/72    Weight from Last 3 Encounters:   07/20/18 293 lb 3.2 oz (133 kg)   04/27/18 284 lb (128.8 kg)   12/11/17 285 lb 1.6 oz (129.3 kg)              We Performed the Following     ORTHOPEDICS ADULT REFERRAL        Primary Care Provider Office Phone # Fax #    Mina Brad Walters [phone]       [address] [facility] [address]        Equal Access to Services     [facility] : Hadii princess barber Soling, waalfredoda luqadaha, qaybta kaalmada blanche george. So [facility] [phone].    ATENCIÓN: Si habla español, tiene a morales disposición servicios gratuitos de asistencia lingüística.  Aleyda christie [phone].    We comply with applicable federal civil rights laws and [place] laws. We do not discriminate on the basis of race, color, national origin, age, disability, sex, sexual orientation, or gender identity.            Thank you!     Thank you for choosing [facility]  for your care. Our goal is always to provide you with excellent care. Hearing back from our patients is one way we can continue to improve our services. Please take a few minutes to complete the written survey that you may receive in the mail after your visit with us. Thank you!             Your Updated Medication List - Protect others around you: Learn how to safely use, store and throw away your medicines at www.disposemymeds.org.          This list is accurate as of 7/20/18 10:43 AM.  Always use your most recent med list.                   Brand Name Dispense Instructions for use Diagnosis    ADVIL 200 MG tablet   Generic drug:  ibuprofen     120    Reported on 5/2/2017        aspirin 81 MG tablet     30 tablet    Take by mouth daily        clobetasol 0.05 % cream    TEMOVATE    60 g    Small amount to affected areas BID    Yeast dermatitis       desoximetasone 0.25 % cream    TOPICORT    100 g    use as directed to legs    Rash       ezetimibe 10 MG tablet    ZETIA    90 tablet    Take 1 tablet (10 mg) by mouth daily    Hyperlipidemia LDL goal <130       finasteride 5 MG tablet    PROSCAR    90 tablet    Take 1 tablet (5 mg) by mouth daily    Benign prostatic hyperplasia with urinary frequency       losartan-hydrochlorothiazide 100-25 MG per tablet    HYZAAR    90 tablet    Take 1 tablet by mouth daily    Essential hypertension with goal blood pressure less than 140/90       metFORMIN 500 MG tablet    GLUCOPHAGE    180 tablet    Take 1 tablet (500 mg) by mouth 2 times daily (with meals)    Controlled type 2 diabetes mellitus without complication, without long-term current use of insulin (H)       metoprolol  succinate 100 MG 24 hr tablet    TOPROL-XL    90 tablet    Take 1 tablet (100 mg) by mouth daily    Essential hypertension with goal blood pressure less than 140/90       NIFEdipine ER 60 MG 24 hr tablet    ADALAT CC    90 tablet    Take 1 tablet (60 mg) by mouth daily    Benign essential hypertension       order for DME     1 Units    Equipment being ordered: CPAP and related hardware, mask and tubing as well heated humidity equipment.    LUCAS (obstructive sleep apnea), Morbid obesity with BMI of 45.0-49.9, adult (H)       TYLENOL PO      As needed        vitamin D 2000 units tablet      Take 1 tablet by mouth daily.        vitamin E 400 UNIT capsule

## 2018-07-21 ENCOUNTER — HOSPITAL ENCOUNTER (OUTPATIENT)
Dept: MRI IMAGING | Facility: CLINIC | Age: 73
Discharge: HOME | End: 2018-07-21
Attending: PHYSICIAN ASSISTANT | Admitting: PHYSICIAN ASSISTANT
Payer: MEDICARE

## 2018-07-21 DIAGNOSIS — S83.241A ACUTE MEDIAL MENISCUS TEAR OF RIGHT KNEE, INITIAL ENCOUNTER: ICD-10-CM

## 2018-07-21 PROCEDURE — 73721 MRI JNT OF LWR EXTRE W/O DYE: CPT | Mod: RT

## 2018-08-01 ENCOUNTER — OFFICE VISIT (OUTPATIENT)
Dept: FAMILY MEDICINE | Facility: OTHER | Age: 73
End: 2018-08-01
Payer: COMMERCIAL

## 2018-08-01 ENCOUNTER — OFFICE VISIT (OUTPATIENT)
Dept: ORTHOPEDICS | Facility: OTHER | Age: 73
End: 2018-08-01
Attending: PHYSICIAN ASSISTANT
Payer: COMMERCIAL

## 2018-08-01 ENCOUNTER — RADIANT APPOINTMENT (OUTPATIENT)
Dept: GENERAL RADIOLOGY | Facility: OTHER | Age: 73
End: 2018-08-01
Attending: ORTHOPAEDIC SURGERY
Payer: COMMERCIAL

## 2018-08-01 VITALS
BODY MASS INDEX: 43.49 KG/M2 | DIASTOLIC BLOOD PRESSURE: 76 MMHG | OXYGEN SATURATION: 98 % | SYSTOLIC BLOOD PRESSURE: 122 MMHG | HEART RATE: 73 BPM | TEMPERATURE: 97.7 F | WEIGHT: 286 LBS

## 2018-08-01 VITALS
SYSTOLIC BLOOD PRESSURE: 131 MMHG | DIASTOLIC BLOOD PRESSURE: 81 MMHG | BODY MASS INDEX: 43.35 KG/M2 | HEIGHT: 68 IN | WEIGHT: 286 LBS

## 2018-08-01 DIAGNOSIS — I10 ESSENTIAL HYPERTENSION WITH GOAL BLOOD PRESSURE LESS THAN 140/90: ICD-10-CM

## 2018-08-01 DIAGNOSIS — Z01.818 PREOP GENERAL PHYSICAL EXAM: Primary | ICD-10-CM

## 2018-08-01 DIAGNOSIS — S83.231A COMPLEX TEAR OF MEDIAL MENISCUS OF RIGHT KNEE AS CURRENT INJURY, INITIAL ENCOUNTER: ICD-10-CM

## 2018-08-01 DIAGNOSIS — M25.561 RIGHT KNEE PAIN: ICD-10-CM

## 2018-08-01 DIAGNOSIS — G47.33 OSA (OBSTRUCTIVE SLEEP APNEA): ICD-10-CM

## 2018-08-01 DIAGNOSIS — N40.0 BENIGN NON-NODULAR PROSTATIC HYPERPLASIA: ICD-10-CM

## 2018-08-01 DIAGNOSIS — M17.11 PRIMARY OSTEOARTHRITIS OF RIGHT KNEE: Primary | ICD-10-CM

## 2018-08-01 DIAGNOSIS — E66.01 MORBID OBESITY WITH BMI OF 45.0-49.9, ADULT (H): ICD-10-CM

## 2018-08-01 LAB
ALBUMIN UR-MCNC: ABNORMAL MG/DL
APPEARANCE UR: CLEAR
BACTERIA #/AREA URNS HPF: ABNORMAL /HPF
BILIRUB UR QL STRIP: NEGATIVE
COLOR UR AUTO: YELLOW
ERYTHROCYTE [DISTWIDTH] IN BLOOD BY AUTOMATED COUNT: 12.2 % (ref 10–15)
GLUCOSE UR STRIP-MCNC: NEGATIVE MG/DL
HCT VFR BLD AUTO: 44.3 % (ref 40–53)
HGB BLD-MCNC: 15.3 G/DL (ref 13.3–17.7)
HGB UR QL STRIP: NEGATIVE
KETONES UR STRIP-MCNC: NEGATIVE MG/DL
LEUKOCYTE ESTERASE UR QL STRIP: NEGATIVE
MCH RBC QN AUTO: 31.7 PG (ref 26.5–33)
MCHC RBC AUTO-ENTMCNC: 34.5 G/DL (ref 31.5–36.5)
MCV RBC AUTO: 92 FL (ref 78–100)
MUCOUS THREADS #/AREA URNS LPF: PRESENT /LPF
NITRATE UR QL: NEGATIVE
PH UR STRIP: 6 PH (ref 5–7)
PLATELET # BLD AUTO: 293 10E9/L (ref 150–450)
RBC # BLD AUTO: 4.82 10E12/L (ref 4.4–5.9)
RBC #/AREA URNS AUTO: ABNORMAL /HPF
SOURCE: ABNORMAL
SP GR UR STRIP: >1.03 (ref 1–1.03)
UROBILINOGEN UR STRIP-ACNC: 1 EU/DL (ref 0.2–1)
WBC # BLD AUTO: 12.5 10E9/L (ref 4–11)
WBC #/AREA URNS AUTO: ABNORMAL /HPF

## 2018-08-01 PROCEDURE — 93000 ELECTROCARDIOGRAM COMPLETE: CPT | Performed by: INTERNAL MEDICINE

## 2018-08-01 PROCEDURE — 99215 OFFICE O/P EST HI 40 MIN: CPT | Performed by: INTERNAL MEDICINE

## 2018-08-01 PROCEDURE — 81001 URINALYSIS AUTO W/SCOPE: CPT | Performed by: INTERNAL MEDICINE

## 2018-08-01 PROCEDURE — 85027 COMPLETE CBC AUTOMATED: CPT | Performed by: INTERNAL MEDICINE

## 2018-08-01 PROCEDURE — 99213 OFFICE O/P EST LOW 20 MIN: CPT | Performed by: ORTHOPAEDIC SURGERY

## 2018-08-01 PROCEDURE — 40000869 ZZHCL STATISTIC MRSA/MSSA PRESURGICAL SCREEN CULTURE: Performed by: INTERNAL MEDICINE

## 2018-08-01 PROCEDURE — 36415 COLL VENOUS BLD VENIPUNCTURE: CPT | Performed by: INTERNAL MEDICINE

## 2018-08-01 PROCEDURE — 40000868 ZZHCL STATISTIC MRSA/MSSA PRESURGICAL SCREEN ID: Performed by: INTERNAL MEDICINE

## 2018-08-01 PROCEDURE — 73564 X-RAY EXAM KNEE 4 OR MORE: CPT | Mod: RT

## 2018-08-01 ASSESSMENT — PAIN SCALES - GENERAL
PAINLEVEL: MILD PAIN (2)
PAINLEVEL: MODERATE PAIN (4)

## 2018-08-01 NOTE — PROGRESS NOTES
"Office Visit-Follow up    Chief Complaint: Ben Ceron is a 72 year old male who is being seen for No chief complaint on file.      History of Present Illness:   Seen previously for left knee, presents today for right knee  Ben Ceron is a 72 year old male who is seen in consultation at the request of Dr. Tran for evaluation of right knee pain.  States pain started to right knee, mostly medial, sharp burning non-radiating pain that started to notice it about 2-3 weeks ago after sleeping funny then the next day played 18 holes of golf and started to have much worsening pain. Unsure if swelling.  States the pain is similar but much more intense than previous pain on the left.  States walking, and especially twisting worsens the pain.  Cannot take ibuprofen due to blood pressure issues. Tylenol helps some, has been using crutches, ice, and resting without much better.  Has a previous hx of similar issues to left knee, had an arthroscopy with partial medial meniscectomy performed 2 years and this resolved all the symptoms. Hx of diabetes. Last A1c 5.7.  Normally very active but has been unable to play golf, engage in much physical activity       REVIEW OF SYSTEMS  General: negative for, night sweats, dizziness, fatigue  Resp: No shortness of breath and no cough  CV: negative for chest pain, syncope or near-syncope  GI: negative for nausea, vomiting and diarrhea  : negative for dysuria and hematuria  Musculoskeletal: as above  Neurologic: negative for syncope   Hematologic: negative for bleeding disorder    Physical Exam:  Vitals: /81  Ht 1.727 m (5' 8\")  Wt 129.7 kg (286 lb)  BMI 43.49 kg/m2  BMI= Body mass index is 43.49 kg/(m^2).  Constitutional: healthy, alert and no acute distress   Psychiatric: mentation appears normal and affect normal/bright  NEURO: no focal deficits  RESP: Normal with easy respirations and no use of accessory muscles to breathe, no audible wheezing or retractions  CV: RLE: no " edema         Regular rate and rhythm by palpation  SKIN: No erythema, rashes, excoriation, or breakdown. No evidence of infection.   JOINT/EXTREMITIES:right Knee Exam: Inspection: mild effusion, no deformity.  Tender to medial joint line. No other focal tenderness.Twisting and Digna's recreates the pain. No instability, no pain with valgus/varus stressing.  AROM 0-110.  Distal neurovascular grossly intact.   Lymph: no appreciated lymphedema to RLE   GAIT: antalgic            Diagnostic Modalities:  right knee X-ray: moderate joint space narrowing to medial joint space with osteophytes. Mild joint space narrowing with osteophyte formation. Lateral joint space no significant narrowing.  Right knee MRI: limited by motion, suspect posterior horn medial meniscus tear. Grade 3 chondromalacia in medial compartment and grade 2 throughout rest of knee.  Independent visualization of the images was performed.      Impression: right knee medial mensicus tear with underlying OA    Plan:  All of the above pertinent physical exam and imaging modalities findings was reviewed with Ben and his spouse.  Discussed at length osteoarthritis and meniscus tear as well as treatment options.  Offered conservative care including steroid injection.  Because the symptoms were similar to an episode 2 years ago that resolved with knee scope and medial partial meniscectomy he is most interested in that options.  Did discuss at length limitations of knee arthroscopy and menisectomy with the underlying OA and a knee arthroscopy could further flair up the osteoarthritis.  Discussed that could not guarantee any symptomatic relief or improvement.  Patient would like to proceed with knee arthroscopy.     The patient has attempted conservative treatments that include Tylenol, activity modifications, rest, crutches and partial non-weight bearing yet still continues to have significant issues. These issues include pain at rest, increased pain with  activity, pain that is inhibiting physical activity including golf. Secondary to these reasons I have offered surgery in the form of left knee arthroscopy with partial medial meniscectomy.    Risks, benefits, complications, limitations, and anticipated postoperative course were discussed. Risks including infections (requiring possible repeat surgery and antibiotics), bleeding, blood clots, cartilage degeneration (arthritis), scar, scar tenderness, stiffness, skin problems and failure to relieve all symptoms were reviewed.   Generally most patients are able to return to desk work in one week and active work in 3-4 weeks. All question were answered, surgery is planned in the near future.       Return to clinic 10 days post-operatively. , or sooner as needed for changes.  Re-x-ray on return: No    Scribed by:  NELLY Lane, CNP, DNP  4:34 PM  8/1/2018    I attest I have seen and evaluated the patient.  I agree with above impression and plan.  Chris Dominguez D.O.

## 2018-08-01 NOTE — LETTER
August 10, 2018      Ben Ceron  [address] [facility] [address]        Dear ,    We are writing to inform you of your test results.    The urine sample is negative.   The blood cell count shows an elevated white count at 12.5.  This is essentially unchanged from a year ago.  We will want to check this sometime in the future.     Resulted Orders   MRSA MSSA Presurgical Screen   Result Value Ref Range    Specimen Description Nose     Culture Micro No Staphylococcus aureus isolated     Culture Micro No MRSA isolated    CBC with platelets   Result Value Ref Range    WBC 12.5 (H) 4.0 - 11.0 10e9/L    RBC Count 4.82 4.4 - 5.9 10e12/L    Hemoglobin 15.3 13.3 - 17.7 g/dL    Hematocrit 44.3 40.0 - 53.0 %    MCV 92 78 - 100 fl    MCH 31.7 26.5 - 33.0 pg    MCHC 34.5 31.5 - 36.5 g/dL    RDW 12.2 10.0 - 15.0 %    Platelet Count 293 150 - 450 10e9/L   UA reflex to Microscopic   Result Value Ref Range    Color Urine Yellow     Appearance Urine Clear     Glucose Urine Negative NEG^Negative mg/dL    Bilirubin Urine Negative NEG^Negative    Ketones Urine Negative NEG^Negative mg/dL    Specific Gravity Urine >1.030 1.003 - 1.035    Blood Urine Negative NEG^Negative    pH Urine 6.0 5.0 - 7.0 pH    Protein Albumin Urine Trace (A) NEG^Negative mg/dL    Urobilinogen Urine 1.0 0.2 - 1.0 EU/dL    Nitrite Urine Negative NEG^Negative    Leukocyte Esterase Urine Negative NEG^Negative    Source Midstream Urine    Urine Microscopic   Result Value Ref Range    WBC Urine 0 - 5 OTO5^0 - 5 /HPF    RBC Urine O - 2 OTO2^O - 2 /HPF    Bacteria Urine Few (A) NEG^Negative /HPF    Mucous Urine Present (A) NEG^Negative /LPF       If you have any questions or concerns, please call the clinic at the number listed above.       Sincerely,        Mina Walters, DO

## 2018-08-01 NOTE — LETTER
"    8/1/2018         RE: Ben Ceron  [address] [facility] [address]        Dear Colleague,    Thank you for referring your patient, Ben Ceron, to the [facility]. Please see a copy of my visit note below.    Office Visit-Follow up    Chief Complaint: Ben Ceron is a 72 year old male who is being seen for No chief complaint on file.      History of Present Illness:   Seen previously for left knee, presents today for right knee  Ben Ceron is a 72 year old male who is seen in consultation at the request of Dr. Tran for evaluation of right knee pain.  States pain started to right knee, mostly medial, sharp burning non-radiating pain that started to notice it about 2-3 weeks ago after sleeping funny then the next day played 18 holes of golf and started to have much worsening pain. Unsure if swelling.  States the pain is similar but much more intense than previous pain on the left.  States walking, and especially twisting worsens the pain.  Cannot take ibuprofen due to blood pressure issues. Tylenol helps some, has been using crutches, ice, and resting without much better.  Has a previous hx of similar issues to left knee, had an arthroscopy with partial medial meniscectomy performed 2 years and this resolved all the symptoms. Hx of diabetes. Last A1c 5.7.  Normally very active but has been unable to play golf, engage in much physical activity       REVIEW OF SYSTEMS  General: negative for, night sweats, dizziness, fatigue  Resp: No shortness of breath and no cough  CV: negative for chest pain, syncope or near-syncope  GI: negative for nausea, vomiting and diarrhea  : negative for dysuria and hematuria  Musculoskeletal: as above  Neurologic: negative for syncope   Hematologic: negative for bleeding disorder    Physical Exam:  Vitals: /81  Ht 1.727 m (5' 8\")  Wt 129.7 kg (286 lb)  BMI 43.49 kg/m2  BMI= Body mass index is 43.49 kg/(m^2).  Constitutional: healthy, alert and no acute " distress   Psychiatric: mentation appears normal and affect normal/bright  NEURO: no focal deficits  RESP: Normal with easy respirations and no use of accessory muscles to breathe, no audible wheezing or retractions  CV: RLE: no edema         Regular rate and rhythm by palpation  SKIN: No erythema, rashes, excoriation, or breakdown. No evidence of infection.   JOINT/EXTREMITIES:right Knee Exam: Inspection: mild effusion, no deformity.  Tender to medial joint line. No other focal tenderness.Twisting and Digna's recreates the pain. No instability, no pain with valgus/varus stressing.  AROM 0-110.  Distal neurovascular grossly intact.   Lymph: no appreciated lymphedema to RLE   GAIT: antalgic            Diagnostic Modalities:  right knee X-ray: moderate joint space narrowing to medial joint space with osteophytes. Mild joint space narrowing with osteophyte formation. Lateral joint space no significant narrowing.  Right knee MRI: limited by motion, suspect posterior horn medial meniscus tear. Grade 3 chondromalacia in medial compartment and grade 2 throughout rest of knee.  Independent visualization of the images was performed.      Impression: right knee medial mensicus tear with underlying OA    Plan:  All of the above pertinent physical exam and imaging modalities findings was reviewed with Ben and his spouse.  Discussed at length osteoarthritis and meniscus tear as well as treatment options.  Offered conservative care including steroid injection.  Because the symptoms were similar to an episode 2 years ago that resolved with knee scope and medial partial meniscectomy he is most interested in that options.  Did discuss at length limitations of knee arthroscopy and menisectomy with the underlying OA and a knee arthroscopy could further flair up the osteoarthritis.  Discussed that could not guarantee any symptomatic relief or improvement.  Patient would like to proceed with knee arthroscopy.     The patient has  attempted conservative treatments that include Tylenol, activity modifications, rest, crutches and partial non-weight bearing yet still continues to have significant issues. These issues include pain at rest, increased pain with activity, pain that is inhibiting physical activity including golf. Secondary to these reasons I have offered surgery in the form of left knee arthroscopy with partial medial meniscectomy.    Risks, benefits, complications, limitations, and anticipated postoperative course were discussed. Risks including infections (requiring possible repeat surgery and antibiotics), bleeding, blood clots, cartilage degeneration (arthritis), scar, scar tenderness, stiffness, skin problems and failure to relieve all symptoms were reviewed.   Generally most patients are able to return to desk work in one week and active work in 3-4 weeks. All question were answered, surgery is planned in the near future.       Return to clinic 10 days post-operatively. , or sooner as needed for changes.  Re-x-ray on return: No    Scribed by:  NELLY Lane, CNP, DNP  4:34 PM  8/1/2018    I attest I have seen and evaluated the patient.  I agree with above impression and plan.  Chris Dominguez D.O.            Again, thank you for allowing me to participate in the care of your patient.        Sincerely,        Ramana Dominguez, DO

## 2018-08-01 NOTE — PROGRESS NOTES
[facility]  [address] [facility] [address]  [phone]  Dept: [phone]    PRE-OP EVALUATION:  Today's date: 2018    Ben Ceron (: 1945) presents for pre-operative evaluation assessment as requested by Dr. Dominguez.  He requires evaluation and anesthesia risk assessment prior to undergoing surgery/procedure for treatment of Knee pain .    Primary Physician: Mina Walters  Type of Anesthesia Anticipated: General    Patient has a Health Care Directive or Living Will:  YES on file    Preop Questions 2018   Who is doing your surgery? john   What are you having done? rt knee arthroscopy   Date of Surgery/Procedure: 8 3 2018   Facility or Hospital where procedure/surgery will be performed: [facility]   1.  Do you have a history of Heart attack, stroke, stent, coronary bypass surgery, or other heart surgery? No   2.  Do you ever have any pain or discomfort in your chest? No   3.  Do you have a history of  Heart Failure? No   4.   Are you troubled by shortness of breath when:  walking on a level surface, or up a slight hill, or at night? No   5.  Do you currently have a cold, bronchitis or other respiratory infection? No   6.  Do you have a cough, shortness of breath, or wheezing? No   7.  Do you sometimes get pains in the calves of your legs when you walk? No   8. Do you or anyone in your family have previous history of blood clots? No   9.  Do you or does anyone in your family have a serious bleeding problem such as prolonged bleeding following surgeries or cuts? No   10. Have you ever had problems with anemia or been told to take iron pills? No   11. Have you had any abnormal blood loss such as black, tarry or bloody stools? No   12. Have you ever had a blood transfusion? No   13. Have you or any of your relatives ever had problems with anesthesia? No   14. Do you have sleep apnea, excessive snoring or daytime drowsiness? YES -    15. Do you have any  prosthetic heart valves? No   16. Do you have prosthetic joints? No         HPI:     HPI related to upcoming procedure: Patient has severe progressive pain in the left knee.  Ambulation is possible quite difficult.  Patient does not remember any specific trauma, this has been slowly progressing over an extended period of time.      See problem list for active medical problems.  Problems all longstanding and stable, except as noted/documented.  See ROS for pertinent symptoms related to these conditions.                                                                                                                                                          .    MEDICAL HISTORY:     Patient Active Problem List    Diagnosis Date Noted     Benign prostatic hyperplasia with urinary frequency 04/27/2018     Priority: Medium     Hyperlipidemia LDL goal <100 05/03/2017     Priority: Medium     Primary osteoarthritis of left knee 12/15/2016     Priority: Medium     Benign non-nodular prostatic hyperplasia, presence of lower urinary tract symptoms unspecified 05/31/2016     Priority: Medium     Morbid obesity with BMI of 45.0-49.9, adult (H) 04/04/2016     Priority: Medium     Family history of colon cancer 04/04/2016     Priority: Medium     brother with colonoscopy       Rash 04/04/2016     Priority: Medium     LUCAS (obstructive sleep apnea) 04/04/2016     Priority: Medium     Essential hypertension with goal blood pressure less than 140/90 08/27/2013     Priority: Medium     Advance Care Planning 11/18/2011     Priority: Medium     Advance Care Planning: Receipt of ACP document:  Received: Health Care Directive which was witnessed or notarized on 12-22-10.  Document previously scanned on 11-15-11.  Validation form completed and sent to be scanned.  Code Status should state FULL code.  Confirmed/documented designated decision maker(s). See permanent comments section of demographics in clinical tab. View document(s) and details  by clicking on code status. Added by Meena Prescott RN, System ACP Coordinator [employer] on 12/3/2014.  11/18/11Received outside Health Care Directive. Previously signed by patient and notary on 12/22/2010.  scanned and placed behind media tab on 11/15/11.  Please see Health Care Directive for specifics. Code status updated...Marylu Cruz RN          Past Medical History:   Diagnosis Date     Benign non-nodular prostatic hyperplasia, presence of lower urinary tract symptoms unspecified 5/31/2016     Colonic polyps      Family history of colon cancer 4/4/2016    brother with colonoscopy      Hyperlipidemia LDL goal <100 5/3/2017     Hyperlipidemia LDL goal <130 12/8/2016     LUCAS (obstructive sleep apnea) 4/4/2016     Rash 4/4/2016     Past Surgical History:   Procedure Laterality Date     ARTHROSCOPY KNEE WITH MEDIAL MENISCECTOMY  6/6/2014    Procedure: ARTHROSCOPY KNEE WITH MEDIAL MENISCECTOMY;  Surgeon: Ramana Dominguez DO;  Location: [facility] OR     COLONOSCOPY  11/29/2011    Polypectomy.     COLONOSCOPY N/A 11/12/2014    Procedure: COLONOSCOPY;  Surgeon: Brooks Burris MD;  Location: [facility] GI     HC COLONOSCOPY W BIOPSY  11/24/08     HC COLONOSCOPY W/WO BRUSH/WASH  11/21/2005    Polypectomy.     HC VASECTOMY UNILAT/BILAT W POSTOP SEMEN  1977    Vasectomy     Current Outpatient Prescriptions   Medication Sig Dispense Refill     Acetaminophen (TYLENOL PO) As needed       ADVIL 200 MG OR TABS Reported on 5/2/2017 120 0     aspirin 81 MG tablet Take by mouth daily 30 tablet      Cholecalciferol (VITAMIN D) 2000 UNITS tablet Take 1 tablet by mouth daily.       clobetasol (TEMOVATE) 0.05 % cream Small amount to affected areas BID 60 g 1     desoximetasone (TOPICORT) 0.25 % cream use as directed to legs 100 g 1     ezetimibe (ZETIA) 10 MG tablet Take 1 tablet (10 mg) by mouth daily 90 tablet 3     finasteride (PROSCAR) 5 MG tablet Take 1 tablet (5 mg) by mouth daily 90 tablet 3      losartan-hydrochlorothiazide (HYZAAR) 100-25 MG per tablet Take 1 tablet by mouth daily 90 tablet 3     metFORMIN (GLUCOPHAGE) 500 MG tablet Take 1 tablet (500 mg) by mouth 2 times daily (with meals) 180 tablet 3     metoprolol succinate (TOPROL-XL) 100 MG 24 hr tablet Take 1 tablet (100 mg) by mouth daily 90 tablet 3     NIFEdipine ER (ADALAT CC) 60 MG 24 hr tablet Take 1 tablet (60 mg) by mouth daily 90 tablet 3     order for DME Equipment being ordered: CPAP and related hardware, mask and tubing as well heated humidity equipment. 1 Units 0     VITAMIN E 400 UNIT OR CAPS   0     OTC products: None, except as noted above    Allergies   Allergen Reactions     Norvasc [Amlodipine] Muscle Pain (Myalgia)     Simvastatin Rash     Rash and edema      Latex Allergy: NO    Social History   Substance Use Topics     Smoking status: Never Smoker     Smokeless tobacco: Never Used     Alcohol use 0.0 oz/week     0 Standard drinks or equivalent per week      Comment: occasional     History   Drug Use No       REVIEW OF SYSTEMS:   CONSTITUTIONAL: NEGATIVE for fever, chills, change in weight  INTEGUMENTARY/SKIN: NEGATIVE for worrisome rashes, moles or lesions  EYES: NEGATIVE for vision changes or irritation  ENT/MOUTH: NEGATIVE for ear, mouth and throat problems  RESP: NEGATIVE for significant cough or SOB  CV: NEGATIVE for chest pain, palpitations or peripheral edema  GI: NEGATIVE for nausea, abdominal pain, heartburn, or change in bowel habits  : NEGATIVE for frequency, dysuria, or hematuria  MUSCULOSKELETAL: Increased left knee pain with any walking is noted.  No other joint inflammation is present.  NEURO: NEGATIVE for weakness, dizziness or paresthesias  ENDOCRINE: NEGATIVE for temperature intolerance, skin/hair changes  HEME: NEGATIVE for bleeding problems  PSYCHIATRIC: NEGATIVE for changes in mood or affect    EXAM:   /76 (BP Location: Left arm, Patient Position: Chair, Cuff Size: Adult Large)  Pulse 73  Temp 97.7   F (36.5  C) (Temporal)  Wt 286 lb (129.7 kg)  SpO2 98%  BMI 43.49 kg/m2    GENERAL APPEARANCE: healthy, alert and no distress     EYES: EOMI,  PERRL     HENT: ear canals and TM's normal and nose and mouth without ulcers or lesions     NECK: no adenopathy, no asymmetry, masses, or scars and thyroid normal to palpation     RESP: lungs clear to auscultation - no rales, rhonchi or wheezes     CV: regular rates and rhythm, normal S1 S2, no S3 or S4 and no murmur, click or rub     ABDOMEN:  soft, nontender, no HSM or masses and bowel sounds normal.  Abdomen is obese.     MS: Coarse crepitance with any motion is noted.  Increased pain with activity.  Synovial thickening is palpable.     SKIN: no suspicious lesions or rashes     NEURO: Normal strength and tone, sensory exam grossly normal, mentation intact and speech normal     PSYCH: mentation appears normal. and affect normal/bright     LYMPHATICS: No cervical adenopathy    DIAGNOSTICS:   EKG: appears normal, NSR, normal axis, normal intervals, no acute ST/T changes c/w ischemia, no LVH by voltage criteria, unchanged from previous tracings  Component Value Flag Ref Range Units Status Collected Lab   WBC Urine 0 - 5  OTO5^0 - 5 /HPF Final 08/01/2018  4:17 PM fn [facility] lab   RBC Urine O - 2  OTO2^O - 2 /HPF Final 08/01/2018  4:17 PM [facility] lab   Bacteria Urine Few (A) NEG^Negative /HPF Final 08/01/2018  4:17 PM [facility] lab   Mucous Urine Present (A) NEG^Negative /LPF Final 08/01/2018  4:17 PM [facility]     Component Value Flag Ref Range Units Status Collected Lab   WBC 12.5 (H) 4.0 - 11.0 10e9/L Final 08/01/2018  4:13 PM fn [facility]   RBC Count 4.82  4.4 - 5.9 10e12/L Final 08/01/2018  4:13 PM [facility]   Hemoglobin 15.3  13.3 - 17.7 g/dL Final 08/01/2018  4:13 PM fn [facility]   Hematocrit 44.3  40.0 - 53.0 % Final 08/01/2018  4:13 PM [facility]   MCV 92  78 - 100 fl Final 08/01/2018  4:13 PM fn [facility]   MCH 31.7  26.5 - 33.0 pg Final 08/01/2018  4:13 PM fn [facility] lab   [facility]  34.5  31.5 - 36.5 g/dL Final 08/01/2018  4:13 PM fn [facility] lab   RDW 12.2  10.0 - 15.0 % Final 08/01/2018  4:13 PM fn [facility] lab   Platelet Count 293  150 - 450 10e9/L Final 08/01/2018  4:13 PM fn [facility] lab         IMPRESSION:   Reason for surgery/procedure: Patient has severe right knee pain secondary to a complex tear of the medial meniscus.  Diagnosis/reason for consult: Perioperative risk assessment in a pleasant 72-year-old male with:  1. Complex tear of medial meniscus of right knee as current injury, initial encounter    2. Preop general physical exam  - MRSA MSSA Presurgical Screen  - CBC with platelets  - UA reflex to Microscopic  - EKG 12-lead complete w/read - Clinics  - Urine Microscopic    3. Essential hypertension with goal blood pressure less than 140/90    4. LUCAS (obstructive sleep apnea)    5. Morbid obesity with BMI of 45.0-49.9, adult (H)    6. Benign non-nodular prostatic hyperplasia    The proposed surgical procedure is considered LOW risk.    REVISED CARDIAC RISK INDEX  The patient has the following serious cardiovascular risks for perioperative complications such as (MI, PE, VFib and 3  AV Block):  No serious cardiac risks  INTERPRETATION: 1 risks: Class II (low risk - 0.9% complication rate)    The patient has the following additional risks for perioperative complications:  No identified additional risks      ICD-10-CM    1. Preop general physical exam Z01.818        RECOMMENDATIONS:   The patient has obstructive sleep apnea and should not be left in the supine position unattended following anesthesia.      --Patient is to take all scheduled medications on the day of surgery EXCEPT for modifications listed below.  Patient has already discontinued aspirin.  Patient will not take metformin the morning of the procedure  Patient will not take losartan combination the morning of the procedure  Patient will continue all other medications as current  Discussed with patient in detail      APPROVAL GIVEN to  proceed with proposed procedure, without further diagnostic evaluation       Signed Electronically by: Mina Walters DO    Copy of this evaluation report is provided to requesting physician.    [place] Preop Guidelines    Revised Cardiac Risk Index

## 2018-08-01 NOTE — MR AVS SNAPSHOT
After Visit Summary   8/1/2018    Ben Ceron    MRN: [id number]           Patient Information     Date Of Birth          1945        Visit Information        Provider Department      8/1/2018 3:20 PM Mina Walters DO [facility]        Today's Diagnoses     Preop general physical exam    -  1    Complex tear of medial meniscus of right knee as current injury, initial encounter        Essential hypertension with goal blood pressure less than 140/90        LUCAS (obstructive sleep apnea)        Morbid obesity with BMI of 45.0-49.9, adult (H)        Benign non-nodular prostatic hyperplasia          Care Instructions      Before Your Surgery      Call your surgeon if there is any change in your health. This includes signs of a cold or flu (such as a sore throat, runny nose, cough, rash or fever).    Do not smoke, drink alcohol or take over the counter medicine (unless your surgeon or primary care doctor tells you to) for the 24 hours before and after surgery.    If you take prescribed drugs: Follow your doctor s orders about which medicines to take and which to stop until after surgery.    Eating and drinking prior to surgery: follow the instructions from your surgeon    Take a shower or bath the night before surgery. Use the soap your surgeon gave you to gently clean your skin. If you do not have soap from your surgeon, use your regular soap. Do not shave or scrub the surgery site.  Wear clean pajamas and have clean sheets on your bed.           Follow-ups after your visit        Your next 10 appointments already scheduled     Aug 03, 2018   Procedure with Ramana Dominguez DO   [facility] ([facility])    [address] [place] [address]   [phone]           From [address]: Exit at [address] on [address] side of [place]. Turn right on [address]. Turn left at stoplight on [address] [facility]  will be in view two blocks ahead            Aug 13, 2018  2:10 PM CDT   Return Visit with Rusty Ayala MD   [facility] ([facility])    [address] [facility] [address] [address] [phone]              Who to contact     If you have questions or need follow up information about today's clinic visit or your schedule please contact [facility] directly at [phone].  Normal or non-critical lab and imaging results will be communicated to you by MyChart, letter or phone within 4 business days after the clinic has received the results. If you do not hear from us within 7 days, please contact the clinic through [employer] or phone. If you have a critical or abnormal lab result, we will notify you by phone as soon as possible.  Submit refill requests through [employer] or call your pharmacy and they will forward the refill request to us. Please allow 3 business days for your refill to be completed.          Additional Information About Your Visit        Care EveryWhere ID     This is your Care EveryWhere ID. This could be used by other organizations to access your [facility] medical records  [id number]        Your Vitals Were     Pulse Temperature Pulse Oximetry BMI (Body Mass Index)          73 97.7  F (36.5  C) (Temporal) 98% 43.49 kg/m2         Blood Pressure from Last 3 Encounters:   08/01/18 122/76   08/01/18 131/81   07/20/18 138/80    Weight from Last 3 Encounters:   08/01/18 286 lb (129.7 kg)   08/01/18 286 lb (129.7 kg)   07/20/18 293 lb 3.2 oz (133 kg)              We Performed the Following     CBC with platelets     EKG 12-lead complete w/read - Clinics     MRSA MSSA Presurgical Screen     UA reflex to Microscopic     Urine Microscopic        Primary Care Provider Office Phone # Fax #    Mina Brad Walters [phone]       [address] [facility] [address]        Equal Access to Services     REMY SANTAMARIA : Tyrell barber  Clara, waalfredoda luqadaha, qaybta kaalmada ariel, blanche lewis phyllishank laTrippjunior agusto. So [facility] [phone].    ATENCIÓN: Si jason sinha, tiene a morales disposición servicios gratuitos de asistencia lingüística. Aleyda al [phone].    We comply with applicable federal civil rights laws and [place] laws. We do not discriminate on the basis of race, color, national origin, age, disability, sex, sexual orientation, or gender identity.            Thank you!     Thank you for choosing [facility]  for your care. Our goal is always to provide you with excellent care. Hearing back from our patients is one way we can continue to improve our services. Please take a few minutes to complete the written survey that you may receive in the mail after your visit with us. Thank you!             Your Updated Medication List - Protect others around you: Learn how to safely use, store and throw away your medicines at www.disposemymeds.org.          This list is accurate as of 8/1/18 11:59 PM.  Always use your most recent med list.                   Brand Name Dispense Instructions for use Diagnosis    ADVIL 200 MG tablet   Generic drug:  ibuprofen     120    Reported on 5/2/2017        aspirin 81 MG tablet     30 tablet    Take by mouth daily        clobetasol 0.05 % cream    TEMOVATE    60 g    Small amount to affected areas BID    Yeast dermatitis       desoximetasone 0.25 % cream    TOPICORT    100 g    use as directed to legs    Rash       ezetimibe 10 MG tablet    ZETIA    90 tablet    Take 1 tablet (10 mg) by mouth daily    Hyperlipidemia LDL goal <130       finasteride 5 MG tablet    PROSCAR    90 tablet    Take 1 tablet (5 mg) by mouth daily    Benign prostatic hyperplasia with urinary frequency       losartan-hydrochlorothiazide 100-25 MG per tablet    HYZAAR    90 tablet    Take 1 tablet by mouth daily    Essential hypertension with goal blood pressure less than 140/90       metFORMIN 500 MG tablet     GLUCOPHAGE    180 tablet    Take 1 tablet (500 mg) by mouth 2 times daily (with meals)    Controlled type 2 diabetes mellitus without complication, without long-term current use of insulin (H)       metoprolol succinate 100 MG 24 hr tablet    TOPROL-XL    90 tablet    Take 1 tablet (100 mg) by mouth daily    Essential hypertension with goal blood pressure less than 140/90       NIFEdipine ER 60 MG 24 hr tablet    ADALAT CC    90 tablet    Take 1 tablet (60 mg) by mouth daily    Benign essential hypertension       order for DME     1 Units    Equipment being ordered: CPAP and related hardware, mask and tubing as well heated humidity equipment.    LUCAS (obstructive sleep apnea), Morbid obesity with BMI of 45.0-49.9, adult (H)       TYLENOL PO      As needed        vitamin D 2000 units tablet      Take 1 tablet by mouth daily.        vitamin E 400 UNIT capsule

## 2018-08-01 NOTE — MR AVS SNAPSHOT
"              After Visit Summary   8/1/2018    Ben Ceron    MRN: [id number]           Patient Information     Date Of Birth          1945        Visit Information        Provider Department      8/1/2018 1:40 PM Ramana Dominguez DO [facility]        Today's Diagnoses     Right knee pain    -  1       Follow-ups after your visit        Who to contact     If you have questions or need follow up information about today's clinic visit or your schedule please contact [facility] directly at [phone].  Normal or non-critical lab and imaging results will be communicated to you by MyChart, letter or phone within 4 business days after the clinic has received the results. If you do not hear from us within 7 days, please contact the clinic through MyChart or phone. If you have a critical or abnormal lab result, we will notify you by phone as soon as possible.  Submit refill requests through [employer] or call your pharmacy and they will forward the refill request to us. Please allow 3 business days for your refill to be completed.          Additional Information About Your Visit        Care EveryWhere ID     This is your Care EveryWhere ID. This could be used by other organizations to access your [place] medical records  [id number]        Your Vitals Were     Height BMI (Body Mass Index)                1.727 m (5' 8\") 43.49 kg/m2           Blood Pressure from Last 3 Encounters:   08/01/18 131/81   07/20/18 138/80   04/27/18 148/82    Weight from Last 3 Encounters:   08/01/18 129.7 kg (286 lb)   07/20/18 133 kg (293 lb 3.2 oz)   04/27/18 128.8 kg (284 lb)               Primary Care Provider Office Phone # Fax #    Mina Brad Walters [phone]       [address] [facility] [address]        Equal Access to Services     REMY SANTAMARIA AH: Tyrell Elizabeth, wamary ballard, qaybta kaalchristopher george, blanche liz. " So [facility] [phone].    ATENCIÓN: Si jason sinha, tiene a morales disposición servicios gratuitos de asistencia lingüística. Aleyda christie [phone].    We comply with applicable federal civil rights laws and [place] laws. We do not discriminate on the basis of race, color, national origin, age, disability, sex, sexual orientation, or gender identity.            Thank you!     Thank you for choosing [facility]  for your care. Our goal is always to provide you with excellent care. Hearing back from our patients is one way we can continue to improve our services. Please take a few minutes to complete the written survey that you may receive in the mail after your visit with us. Thank you!             Your Updated Medication List - Protect others around you: Learn how to safely use, store and throw away your medicines at www.disposemymeds.org.          This list is accurate as of 8/1/18  2:08 PM.  Always use your most recent med list.                   Brand Name Dispense Instructions for use Diagnosis    ADVIL 200 MG tablet   Generic drug:  ibuprofen     120    Reported on 5/2/2017        aspirin 81 MG tablet     30 tablet    Take by mouth daily        clobetasol 0.05 % cream    TEMOVATE    60 g    Small amount to affected areas BID    Yeast dermatitis       desoximetasone 0.25 % cream    TOPICORT    100 g    use as directed to legs    Rash       ezetimibe 10 MG tablet    ZETIA    90 tablet    Take 1 tablet (10 mg) by mouth daily    Hyperlipidemia LDL goal <130       finasteride 5 MG tablet    PROSCAR    90 tablet    Take 1 tablet (5 mg) by mouth daily    Benign prostatic hyperplasia with urinary frequency       losartan-hydrochlorothiazide 100-25 MG per tablet    HYZAAR    90 tablet    Take 1 tablet by mouth daily    Essential hypertension with goal blood pressure less than 140/90       metFORMIN 500 MG tablet    GLUCOPHAGE    180 tablet    Take 1 tablet (500 mg) by mouth 2 times daily (with meals)     Controlled type 2 diabetes mellitus without complication, without long-term current use of insulin (H)       metoprolol succinate 100 MG 24 hr tablet    TOPROL-XL    90 tablet    Take 1 tablet (100 mg) by mouth daily    Essential hypertension with goal blood pressure less than 140/90       NIFEdipine ER 60 MG 24 hr tablet    ADALAT CC    90 tablet    Take 1 tablet (60 mg) by mouth daily    Benign essential hypertension       order for DME     1 Units    Equipment being ordered: CPAP and related hardware, mask and tubing as well heated humidity equipment.    LUCAS (obstructive sleep apnea), Morbid obesity with BMI of 45.0-49.9, adult (H)       TYLENOL PO      As needed        vitamin D 2000 units tablet      Take 1 tablet by mouth daily.        vitamin E 400 UNIT capsule

## 2018-08-02 ENCOUNTER — TELEPHONE (OUTPATIENT)
Dept: ORTHOPEDICS | Facility: OTHER | Age: 73
End: 2018-08-02

## 2018-08-02 ENCOUNTER — ANESTHESIA EVENT (OUTPATIENT)
Dept: SURGERY | Facility: CLINIC | Age: 73
End: 2018-08-02
Payer: MEDICARE

## 2018-08-02 LAB
BACTERIA SPEC CULT: NORMAL
BACTERIA SPEC CULT: NORMAL
SPECIMEN SOURCE: NORMAL

## 2018-08-02 NOTE — TELEPHONE ENCOUNTER
Type of surgery: Right knee arthroscopy with partial meniscectomy  Location of surgery: [facility]  Date and time of surgery: 08/03/18   Surgeon: Dr Dominguez  Pre-Op Appt Date: 08/01/18 w/Dr Walters  Post-Op Appt Date: 08/13/18 w/ Dr Ayala    Packet sent out: given to patient in the office  Pre-cert/Authorization completed:  Yes  Date: 08/02/18

## 2018-08-03 ENCOUNTER — HOSPITAL ENCOUNTER (OUTPATIENT)
Facility: CLINIC | Age: 73
Discharge: HOME | End: 2018-08-03
Attending: ORTHOPAEDIC SURGERY | Admitting: ORTHOPAEDIC SURGERY
Payer: MEDICARE

## 2018-08-03 ENCOUNTER — SURGERY (OUTPATIENT)
Age: 73
End: 2018-08-03

## 2018-08-03 ENCOUNTER — ANESTHESIA (OUTPATIENT)
Dept: SURGERY | Facility: CLINIC | Age: 73
End: 2018-08-03
Payer: MEDICARE

## 2018-08-03 VITALS
RESPIRATION RATE: 16 BRPM | OXYGEN SATURATION: 95 % | DIASTOLIC BLOOD PRESSURE: 72 MMHG | SYSTOLIC BLOOD PRESSURE: 134 MMHG | TEMPERATURE: 98.1 F

## 2018-08-03 DIAGNOSIS — S83.231A COMPLEX TEAR OF MEDIAL MENISCUS OF RIGHT KNEE AS CURRENT INJURY, INITIAL ENCOUNTER: Primary | ICD-10-CM

## 2018-08-03 PROCEDURE — 36000056 ZZH SURGERY LEVEL 3 1ST 30 MIN: Performed by: ORTHOPAEDIC SURGERY

## 2018-08-03 PROCEDURE — 71000027 ZZH RECOVERY PHASE 2 EACH 15 MINS: Performed by: ORTHOPAEDIC SURGERY

## 2018-08-03 PROCEDURE — 29881 ARTHRS KNE SRG MNISECTMY M/L: CPT | Mod: RT | Performed by: ORTHOPAEDIC SURGERY

## 2018-08-03 PROCEDURE — 71000014 ZZH RECOVERY PHASE 1 LEVEL 2 FIRST HR: Performed by: ORTHOPAEDIC SURGERY

## 2018-08-03 PROCEDURE — 25000128 H RX IP 250 OP 636: Performed by: NURSE ANESTHETIST, CERTIFIED REGISTERED

## 2018-08-03 PROCEDURE — 36000058 ZZH SURGERY LEVEL 3 EA 15 ADDTL MIN: Performed by: ORTHOPAEDIC SURGERY

## 2018-08-03 PROCEDURE — 37000009 ZZH ANESTHESIA TECHNICAL FEE, EACH ADDTL 15 MIN: Performed by: ORTHOPAEDIC SURGERY

## 2018-08-03 PROCEDURE — 25000566 ZZH SEVOFLURANE, EA 15 MIN: Performed by: ORTHOPAEDIC SURGERY

## 2018-08-03 PROCEDURE — 25000128 H RX IP 250 OP 636: Performed by: ORTHOPAEDIC SURGERY

## 2018-08-03 PROCEDURE — 37000008 ZZH ANESTHESIA TECHNICAL FEE, 1ST 30 MIN: Performed by: ORTHOPAEDIC SURGERY

## 2018-08-03 PROCEDURE — 25000125 ZZHC RX 250: Performed by: NURSE ANESTHETIST, CERTIFIED REGISTERED

## 2018-08-03 PROCEDURE — 27210794 ZZH OR GENERAL SUPPLY STERILE: Performed by: ORTHOPAEDIC SURGERY

## 2018-08-03 PROCEDURE — 25000125 ZZHC RX 250: Performed by: ORTHOPAEDIC SURGERY

## 2018-08-03 PROCEDURE — 40000306 ZZH STATISTIC PRE PROC ASSESS II: Performed by: ORTHOPAEDIC SURGERY

## 2018-08-03 RX ORDER — EPHEDRINE SULFATE 50 MG/ML
INJECTION, SOLUTION INTRAMUSCULAR; INTRAVENOUS; SUBCUTANEOUS PRN
Status: DISCONTINUED | OUTPATIENT
Start: 2018-08-03 | End: 2018-08-03

## 2018-08-03 RX ORDER — PROPOFOL 10 MG/ML
INJECTION, EMULSION INTRAVENOUS PRN
Status: DISCONTINUED | OUTPATIENT
Start: 2018-08-03 | End: 2018-08-03

## 2018-08-03 RX ORDER — MEPERIDINE HYDROCHLORIDE 25 MG/ML
25-50 INJECTION INTRAMUSCULAR; INTRAVENOUS; SUBCUTANEOUS
Status: DISCONTINUED | OUTPATIENT
Start: 2018-08-03 | End: 2018-08-03 | Stop reason: HOSPADM

## 2018-08-03 RX ORDER — BUPIVACAINE HYDROCHLORIDE AND EPINEPHRINE 2.5; 5 MG/ML; UG/ML
INJECTION, SOLUTION EPIDURAL; INFILTRATION; INTRACAUDAL; PERINEURAL PRN
Status: DISCONTINUED | OUTPATIENT
Start: 2018-08-03 | End: 2018-08-03 | Stop reason: HOSPADM

## 2018-08-03 RX ORDER — METOPROLOL TARTRATE 1 MG/ML
5 INJECTION, SOLUTION INTRAVENOUS EVERY 5 MIN PRN
Status: DISCONTINUED | OUTPATIENT
Start: 2018-08-03 | End: 2018-08-03 | Stop reason: HOSPADM

## 2018-08-03 RX ORDER — ONDANSETRON 2 MG/ML
4 INJECTION INTRAMUSCULAR; INTRAVENOUS EVERY 30 MIN PRN
Status: DISCONTINUED | OUTPATIENT
Start: 2018-08-03 | End: 2018-08-03 | Stop reason: HOSPADM

## 2018-08-03 RX ORDER — DIMENHYDRINATE 50 MG/ML
25 INJECTION, SOLUTION INTRAMUSCULAR; INTRAVENOUS
Status: DISCONTINUED | OUTPATIENT
Start: 2018-08-03 | End: 2018-08-03 | Stop reason: HOSPADM

## 2018-08-03 RX ORDER — OXYCODONE HYDROCHLORIDE 5 MG/1
5-10 TABLET ORAL EVERY 6 HOURS PRN
Qty: 20 TABLET | Refills: 0 | Status: SHIPPED | OUTPATIENT
Start: 2018-08-03 | End: 2018-09-17

## 2018-08-03 RX ORDER — CEFAZOLIN SODIUM 1 G/3ML
1 INJECTION, POWDER, FOR SOLUTION INTRAMUSCULAR; INTRAVENOUS SEE ADMIN INSTRUCTIONS
Status: DISCONTINUED | OUTPATIENT
Start: 2018-08-03 | End: 2018-08-03 | Stop reason: HOSPADM

## 2018-08-03 RX ORDER — FENTANYL CITRATE 50 UG/ML
25-50 INJECTION, SOLUTION INTRAMUSCULAR; INTRAVENOUS
Status: DISCONTINUED | OUTPATIENT
Start: 2018-08-03 | End: 2018-08-03 | Stop reason: HOSPADM

## 2018-08-03 RX ORDER — MEPERIDINE HYDROCHLORIDE 25 MG/ML
12.5 INJECTION INTRAMUSCULAR; INTRAVENOUS; SUBCUTANEOUS
Status: DISCONTINUED | OUTPATIENT
Start: 2018-08-03 | End: 2018-08-03 | Stop reason: HOSPADM

## 2018-08-03 RX ORDER — ALBUTEROL SULFATE 0.83 MG/ML
2.5 SOLUTION RESPIRATORY (INHALATION) EVERY 4 HOURS PRN
Status: DISCONTINUED | OUTPATIENT
Start: 2018-08-03 | End: 2018-08-03 | Stop reason: HOSPADM

## 2018-08-03 RX ORDER — METHOCARBAMOL 750 MG/1
750 TABLET, FILM COATED ORAL EVERY 6 HOURS PRN
Qty: 30 TABLET | Refills: 1 | Status: SHIPPED | OUTPATIENT
Start: 2018-08-03 | End: 2018-09-21

## 2018-08-03 RX ORDER — LIDOCAINE HYDROCHLORIDE 20 MG/ML
INJECTION, SOLUTION INFILTRATION; PERINEURAL PRN
Status: DISCONTINUED | OUTPATIENT
Start: 2018-08-03 | End: 2018-08-03

## 2018-08-03 RX ORDER — OXYCODONE HYDROCHLORIDE 5 MG/1
5 TABLET ORAL
Status: DISCONTINUED | OUTPATIENT
Start: 2018-08-03 | End: 2018-08-03 | Stop reason: HOSPADM

## 2018-08-03 RX ORDER — SODIUM CHLORIDE, SODIUM LACTATE, POTASSIUM CHLORIDE, CALCIUM CHLORIDE 600; 310; 30; 20 MG/100ML; MG/100ML; MG/100ML; MG/100ML
INJECTION, SOLUTION INTRAVENOUS CONTINUOUS
Status: DISCONTINUED | OUTPATIENT
Start: 2018-08-03 | End: 2018-08-03 | Stop reason: HOSPADM

## 2018-08-03 RX ORDER — LIDOCAINE 40 MG/G
CREAM TOPICAL
Status: DISCONTINUED | OUTPATIENT
Start: 2018-08-03 | End: 2018-08-03 | Stop reason: HOSPADM

## 2018-08-03 RX ORDER — HYDROXYZINE HYDROCHLORIDE 25 MG/1
25 TABLET, FILM COATED ORAL
Status: DISCONTINUED | OUTPATIENT
Start: 2018-08-03 | End: 2018-08-03 | Stop reason: HOSPADM

## 2018-08-03 RX ORDER — ONDANSETRON 2 MG/ML
INJECTION INTRAMUSCULAR; INTRAVENOUS PRN
Status: DISCONTINUED | OUTPATIENT
Start: 2018-08-03 | End: 2018-08-03

## 2018-08-03 RX ORDER — HYDRALAZINE HYDROCHLORIDE 20 MG/ML
10 INJECTION INTRAMUSCULAR; INTRAVENOUS EVERY 5 MIN PRN
Status: DISCONTINUED | OUTPATIENT
Start: 2018-08-03 | End: 2018-08-03 | Stop reason: HOSPADM

## 2018-08-03 RX ORDER — HYDROMORPHONE HYDROCHLORIDE 1 MG/ML
.3-.5 INJECTION, SOLUTION INTRAMUSCULAR; INTRAVENOUS; SUBCUTANEOUS EVERY 10 MIN PRN
Status: DISCONTINUED | OUTPATIENT
Start: 2018-08-03 | End: 2018-08-03 | Stop reason: HOSPADM

## 2018-08-03 RX ORDER — FENTANYL CITRATE 50 UG/ML
INJECTION, SOLUTION INTRAMUSCULAR; INTRAVENOUS PRN
Status: DISCONTINUED | OUTPATIENT
Start: 2018-08-03 | End: 2018-08-03

## 2018-08-03 RX ORDER — CEFAZOLIN SODIUM 1 G/50ML
3 SOLUTION INTRAVENOUS
Status: COMPLETED | OUTPATIENT
Start: 2018-08-03 | End: 2018-08-03

## 2018-08-03 RX ORDER — NALOXONE HYDROCHLORIDE 0.4 MG/ML
.1-.4 INJECTION, SOLUTION INTRAMUSCULAR; INTRAVENOUS; SUBCUTANEOUS
Status: DISCONTINUED | OUTPATIENT
Start: 2018-08-03 | End: 2018-08-03 | Stop reason: HOSPADM

## 2018-08-03 RX ORDER — DEXAMETHASONE SODIUM PHOSPHATE 10 MG/ML
INJECTION, SOLUTION INTRAMUSCULAR; INTRAVENOUS PRN
Status: DISCONTINUED | OUTPATIENT
Start: 2018-08-03 | End: 2018-08-03

## 2018-08-03 RX ORDER — ONDANSETRON 4 MG/1
4 TABLET, ORALLY DISINTEGRATING ORAL EVERY 30 MIN PRN
Status: DISCONTINUED | OUTPATIENT
Start: 2018-08-03 | End: 2018-08-03 | Stop reason: HOSPADM

## 2018-08-03 RX ORDER — LIDOCAINE HYDROCHLORIDE AND EPINEPHRINE 10; 10 MG/ML; UG/ML
INJECTION, SOLUTION INFILTRATION; PERINEURAL PRN
Status: DISCONTINUED | OUTPATIENT
Start: 2018-08-03 | End: 2018-08-03 | Stop reason: HOSPADM

## 2018-08-03 RX ORDER — MORPHINE SULFATE 0.5 MG/ML
INJECTION, SOLUTION EPIDURAL; INTRATHECAL; INTRAVENOUS PRN
Status: DISCONTINUED | OUTPATIENT
Start: 2018-08-03 | End: 2018-08-03 | Stop reason: HOSPADM

## 2018-08-03 RX ORDER — BUPIVACAINE HYDROCHLORIDE 2.5 MG/ML
INJECTION, SOLUTION INFILTRATION; PERINEURAL PRN
Status: DISCONTINUED | OUTPATIENT
Start: 2018-08-03 | End: 2018-08-03 | Stop reason: HOSPADM

## 2018-08-03 RX ORDER — HYDROXYZINE HYDROCHLORIDE 50 MG/ML
50 INJECTION, SOLUTION INTRAMUSCULAR EVERY 6 HOURS PRN
Status: DISCONTINUED | OUTPATIENT
Start: 2018-08-03 | End: 2018-08-03 | Stop reason: HOSPADM

## 2018-08-03 RX ADMIN — LIDOCAINE HYDROCHLORIDE AND EPINEPHRINE 30 ML: 10; 10 INJECTION, SOLUTION INFILTRATION; PERINEURAL at 10:34

## 2018-08-03 RX ADMIN — LIDOCAINE HYDROCHLORIDE 0.1 ML: 10 INJECTION, SOLUTION EPIDURAL; INFILTRATION; INTRACAUDAL; PERINEURAL at 09:17

## 2018-08-03 RX ADMIN — PROPOFOL 200 MG: 10 INJECTION, EMULSION INTRAVENOUS at 10:10

## 2018-08-03 RX ADMIN — MORPHINE SULFATE 10 MG: 0.5 INJECTION, SOLUTION EPIDURAL; INTRATHECAL; INTRAVENOUS at 10:39

## 2018-08-03 RX ADMIN — MIDAZOLAM 2 MG: 1 INJECTION INTRAMUSCULAR; INTRAVENOUS at 10:00

## 2018-08-03 RX ADMIN — FENTANYL CITRATE 50 MCG: 50 INJECTION, SOLUTION INTRAMUSCULAR; INTRAVENOUS at 10:14

## 2018-08-03 RX ADMIN — BUPIVACAINE HYDROCHLORIDE AND EPINEPHRINE BITARTRATE 30 ML: 2.5; .0091 INJECTION, SOLUTION EPIDURAL; INFILTRATION; INTRACAUDAL; PERINEURAL at 10:33

## 2018-08-03 RX ADMIN — LIDOCAINE HYDROCHLORIDE 80 MG: 20 INJECTION, SOLUTION INFILTRATION; PERINEURAL at 10:10

## 2018-08-03 RX ADMIN — Medication 10 MG: at 10:20

## 2018-08-03 RX ADMIN — BUPIVACAINE HYDROCHLORIDE 30 ML: 2.5 INJECTION, SOLUTION EPIDURAL; INFILTRATION; INTRACAUDAL; PERINEURAL at 10:44

## 2018-08-03 RX ADMIN — DEXAMETHASONE SODIUM PHOSPHATE 10 MG: 10 INJECTION, SOLUTION INTRAMUSCULAR; INTRAVENOUS at 10:14

## 2018-08-03 RX ADMIN — SODIUM CHLORIDE, POTASSIUM CHLORIDE, SODIUM LACTATE AND CALCIUM CHLORIDE: 600; 310; 30; 20 INJECTION, SOLUTION INTRAVENOUS at 09:17

## 2018-08-03 RX ADMIN — CEFAZOLIN SODIUM 3 G: 1 INJECTION, POWDER, FOR SOLUTION INTRAMUSCULAR; INTRAVENOUS at 10:12

## 2018-08-03 RX ADMIN — ONDANSETRON 4 MG: 2 INJECTION INTRAMUSCULAR; INTRAVENOUS at 10:14

## 2018-08-03 NOTE — BRIEF OP NOTE
[facility] Brief Operative Note    Pre-operative diagnosis: Right knee meniscus tear and chondromalacia   Post-operative diagnosis: right knee medial menicus tear and chondromalacia    Procedure: Procedure(s):  ARTHROSCOPY KNEE WITH MEDIAL MENISCECTOMY   Surgeon: Ramana Dominguez DO   Assistant(s): NELLY Lane, CNP, DNP   Estimated blood loss:  Fluids: Less than 10 ml  800 ml Crystalloids   Specimens: None   Findings: See dictated operative report for full details [id number]     Chris Dominguez D.O.

## 2018-08-03 NOTE — H&P (VIEW-ONLY)
[facility]  [address] [facility] [address]  [phone]  Dept: [phone]    PRE-OP EVALUATION:  Today's date: 2018    Ben Ceron (: 1945) presents for pre-operative evaluation assessment as requested by Dr. Dominguez.  He requires evaluation and anesthesia risk assessment prior to undergoing surgery/procedure for treatment of Knee pain .    Primary Physician: Mina Walters  Type of Anesthesia Anticipated: General    Patient has a Health Care Directive or Living Will:  YES on file    Preop Questions 2018   Who is doing your surgery? john   What are you having done? rt knee arthroscopy   Date of Surgery/Procedure: 8 3 2018   Facility or Hospital where procedure/surgery will be performed: [facility]   1.  Do you have a history of Heart attack, stroke, stent, coronary bypass surgery, or other heart surgery? No   2.  Do you ever have any pain or discomfort in your chest? No   3.  Do you have a history of  Heart Failure? No   4.   Are you troubled by shortness of breath when:  walking on a level surface, or up a slight hill, or at night? No   5.  Do you currently have a cold, bronchitis or other respiratory infection? No   6.  Do you have a cough, shortness of breath, or wheezing? No   7.  Do you sometimes get pains in the calves of your legs when you walk? No   8. Do you or anyone in your family have previous history of blood clots? No   9.  Do you or does anyone in your family have a serious bleeding problem such as prolonged bleeding following surgeries or cuts? No   10. Have you ever had problems with anemia or been told to take iron pills? No   11. Have you had any abnormal blood loss such as black, tarry or bloody stools? No   12. Have you ever had a blood transfusion? No   13. Have you or any of your relatives ever had problems with anesthesia? No   14. Do you have sleep apnea, excessive snoring or daytime drowsiness? YES -    15. Do you have any  prosthetic heart valves? No   16. Do you have prosthetic joints? No         HPI:     HPI related to upcoming procedure: Patient has severe progressive pain in the left knee.  Ambulation is possible quite difficult.  Patient does not remember any specific trauma, this has been slowly progressing over an extended period of time.      See problem list for active medical problems.  Problems all longstanding and stable, except as noted/documented.  See ROS for pertinent symptoms related to these conditions.                                                                                                                                                          .    MEDICAL HISTORY:     Patient Active Problem List    Diagnosis Date Noted     Benign prostatic hyperplasia with urinary frequency 04/27/2018     Priority: Medium     Hyperlipidemia LDL goal <100 05/03/2017     Priority: Medium     Primary osteoarthritis of left knee 12/15/2016     Priority: Medium     Benign non-nodular prostatic hyperplasia, presence of lower urinary tract symptoms unspecified 05/31/2016     Priority: Medium     Morbid obesity with BMI of 45.0-49.9, adult (H) 04/04/2016     Priority: Medium     Family history of colon cancer 04/04/2016     Priority: Medium     brother with colonoscopy       Rash 04/04/2016     Priority: Medium     LUCAS (obstructive sleep apnea) 04/04/2016     Priority: Medium     Essential hypertension with goal blood pressure less than 140/90 08/27/2013     Priority: Medium     Advance Care Planning 11/18/2011     Priority: Medium     Advance Care Planning: Receipt of ACP document:  Received: Health Care Directive which was witnessed or notarized on 12-22-10.  Document previously scanned on 11-15-11.  Validation form completed and sent to be scanned.  Code Status should state FULL code.  Confirmed/documented designated decision maker(s). See permanent comments section of demographics in clinical tab. View document(s) and details  by clicking on code status. Added by Meena Prescott RN, System ACP Coordinator [employer] on 12/3/2014.  11/18/11Received outside Health Care Directive. Previously signed by patient and notary on 12/22/2010.  scanned and placed behind media tab on 11/15/11.  Please see Health Care Directive for specifics. Code status updated...Marylu Cruz RN          Past Medical History:   Diagnosis Date     Benign non-nodular prostatic hyperplasia, presence of lower urinary tract symptoms unspecified 5/31/2016     Colonic polyps      Family history of colon cancer 4/4/2016    brother with colonoscopy      Hyperlipidemia LDL goal <100 5/3/2017     Hyperlipidemia LDL goal <130 12/8/2016     LUCAS (obstructive sleep apnea) 4/4/2016     Rash 4/4/2016     Past Surgical History:   Procedure Laterality Date     ARTHROSCOPY KNEE WITH MEDIAL MENISCECTOMY  6/6/2014    Procedure: ARTHROSCOPY KNEE WITH MEDIAL MENISCECTOMY;  Surgeon: Ramana Dominguez DO;  Location: [facility] OR     COLONOSCOPY  11/29/2011    Polypectomy.     COLONOSCOPY N/A 11/12/2014    Procedure: COLONOSCOPY;  Surgeon: Brooks Burris MD;  Location: [facility] GI     HC COLONOSCOPY W BIOPSY  11/24/08     HC COLONOSCOPY W/WO BRUSH/WASH  11/21/2005    Polypectomy.     HC VASECTOMY UNILAT/BILAT W POSTOP SEMEN  1977    Vasectomy     Current Outpatient Prescriptions   Medication Sig Dispense Refill     Acetaminophen (TYLENOL PO) As needed       ADVIL 200 MG OR TABS Reported on 5/2/2017 120 0     aspirin 81 MG tablet Take by mouth daily 30 tablet      Cholecalciferol (VITAMIN D) 2000 UNITS tablet Take 1 tablet by mouth daily.       clobetasol (TEMOVATE) 0.05 % cream Small amount to affected areas BID 60 g 1     desoximetasone (TOPICORT) 0.25 % cream use as directed to legs 100 g 1     ezetimibe (ZETIA) 10 MG tablet Take 1 tablet (10 mg) by mouth daily 90 tablet 3     finasteride (PROSCAR) 5 MG tablet Take 1 tablet (5 mg) by mouth daily 90 tablet 3      losartan-hydrochlorothiazide (HYZAAR) 100-25 MG per tablet Take 1 tablet by mouth daily 90 tablet 3     metFORMIN (GLUCOPHAGE) 500 MG tablet Take 1 tablet (500 mg) by mouth 2 times daily (with meals) 180 tablet 3     metoprolol succinate (TOPROL-XL) 100 MG 24 hr tablet Take 1 tablet (100 mg) by mouth daily 90 tablet 3     NIFEdipine ER (ADALAT CC) 60 MG 24 hr tablet Take 1 tablet (60 mg) by mouth daily 90 tablet 3     order for DME Equipment being ordered: CPAP and related hardware, mask and tubing as well heated humidity equipment. 1 Units 0     VITAMIN E 400 UNIT OR CAPS   0     OTC products: None, except as noted above    Allergies   Allergen Reactions     Norvasc [Amlodipine] Muscle Pain (Myalgia)     Simvastatin Rash     Rash and edema      Latex Allergy: NO    Social History   Substance Use Topics     Smoking status: Never Smoker     Smokeless tobacco: Never Used     Alcohol use 0.0 oz/week     0 Standard drinks or equivalent per week      Comment: occasional     History   Drug Use No       REVIEW OF SYSTEMS:   CONSTITUTIONAL: NEGATIVE for fever, chills, change in weight  INTEGUMENTARY/SKIN: NEGATIVE for worrisome rashes, moles or lesions  EYES: NEGATIVE for vision changes or irritation  ENT/MOUTH: NEGATIVE for ear, mouth and throat problems  RESP: NEGATIVE for significant cough or SOB  CV: NEGATIVE for chest pain, palpitations or peripheral edema  GI: NEGATIVE for nausea, abdominal pain, heartburn, or change in bowel habits  : NEGATIVE for frequency, dysuria, or hematuria  MUSCULOSKELETAL: Increased left knee pain with any walking is noted.  No other joint inflammation is present.  NEURO: NEGATIVE for weakness, dizziness or paresthesias  ENDOCRINE: NEGATIVE for temperature intolerance, skin/hair changes  HEME: NEGATIVE for bleeding problems  PSYCHIATRIC: NEGATIVE for changes in mood or affect    EXAM:   /76 (BP Location: Left arm, Patient Position: Chair, Cuff Size: Adult Large)  Pulse 73  Temp 97.7   F (36.5  C) (Temporal)  Wt 286 lb (129.7 kg)  SpO2 98%  BMI 43.49 kg/m2    GENERAL APPEARANCE: healthy, alert and no distress     EYES: EOMI,  PERRL     HENT: ear canals and TM's normal and nose and mouth without ulcers or lesions     NECK: no adenopathy, no asymmetry, masses, or scars and thyroid normal to palpation     RESP: lungs clear to auscultation - no rales, rhonchi or wheezes     CV: regular rates and rhythm, normal S1 S2, no S3 or S4 and no murmur, click or rub     ABDOMEN:  soft, nontender, no HSM or masses and bowel sounds normal.  Abdomen is obese.     MS: Coarse crepitance with any motion is noted.  Increased pain with activity.  Synovial thickening is palpable.     SKIN: no suspicious lesions or rashes     NEURO: Normal strength and tone, sensory exam grossly normal, mentation intact and speech normal     PSYCH: mentation appears normal. and affect normal/bright     LYMPHATICS: No cervical adenopathy    DIAGNOSTICS:   EKG: appears normal, NSR, normal axis, normal intervals, no acute ST/T changes c/w ischemia, no LVH by voltage criteria, unchanged from previous tracings  Component Value Flag Ref Range Units Status Collected Lab   WBC Urine 0 - 5  OTO5^0 - 5 /HPF Final 08/01/2018  4:17 PM fn [facility] lab   RBC Urine O - 2  OTO2^O - 2 /HPF Final 08/01/2018  4:17 PM fn [facility] lab   Bacteria Urine Few (A) NEG^Negative /HPF Final 08/01/2018  4:17 PM fn [facility] lab   Mucous Urine Present (A) NEG^Negative /LPF Final 08/01/2018  4:17 PM fn [facility]     Component Value Flag Ref Range Units Status Collected Lab   WBC 12.5 (H) 4.0 - 11.0 10e9/L Final 08/01/2018  4:13 PM fn [facility]   RBC Count 4.82  4.4 - 5.9 10e12/L Final 08/01/2018  4:13 PM fn [facility]   Hemoglobin 15.3  13.3 - 17.7 g/dL Final 08/01/2018  4:13 PM fn [facility]   Hematocrit 44.3  40.0 - 53.0 % Final 08/01/2018  4:13 PM fn [facility]   MCV 92  78 - 100 fl Final 08/01/2018  4:13 PM fn [facility]   MCH 31.7  26.5 - 33.0 pg Final 08/01/2018  4:13 PM fn [facility] lab   [facility]  34.5  31.5 - 36.5 g/dL Final 08/01/2018  4:13 PM fn [facility] lab   RDW 12.2  10.0 - 15.0 % Final 08/01/2018  4:13 PM fn [facility] lab   Platelet Count 293  150 - 450 10e9/L Final 08/01/2018  4:13 PM fn [facility] lab         IMPRESSION:   Reason for surgery/procedure: Patient has severe right knee pain secondary to a complex tear of the medial meniscus.  Diagnosis/reason for consult: Perioperative risk assessment in a pleasant 72-year-old male with:  1. Complex tear of medial meniscus of right knee as current injury, initial encounter    2. Preop general physical exam  - MRSA MSSA Presurgical Screen  - CBC with platelets  - UA reflex to Microscopic  - EKG 12-lead complete w/read - Clinics  - Urine Microscopic    3. Essential hypertension with goal blood pressure less than 140/90    4. LUCAS (obstructive sleep apnea)    5. Morbid obesity with BMI of 45.0-49.9, adult (H)    6. Benign non-nodular prostatic hyperplasia    The proposed surgical procedure is considered LOW risk.    REVISED CARDIAC RISK INDEX  The patient has the following serious cardiovascular risks for perioperative complications such as (MI, PE, VFib and 3  AV Block):  No serious cardiac risks  INTERPRETATION: 1 risks: Class II (low risk - 0.9% complication rate)    The patient has the following additional risks for perioperative complications:  No identified additional risks      ICD-10-CM    1. Preop general physical exam Z01.818        RECOMMENDATIONS:   The patient has obstructive sleep apnea and should not be left in the supine position unattended following anesthesia.      --Patient is to take all scheduled medications on the day of surgery EXCEPT for modifications listed below.  Patient has already discontinued aspirin.  Patient will not take metformin the morning of the procedure  Patient will not take losartan combination the morning of the procedure  Patient will continue all other medications as current  Discussed with patient in detail      APPROVAL GIVEN to  proceed with proposed procedure, without further diagnostic evaluation       Signed Electronically by: Mina Walters DO    Copy of this evaluation report is provided to requesting physician.    [facility] Preop Guidelines    Revised Cardiac Risk Index

## 2018-08-03 NOTE — IP AVS SNAPSHOT
MRN:[id number]                      After Visit Summary   8/3/2018    Ben Ceron    MRN: [id number]           Thank you!     Thank you for choosing [place] for your care. Our goal is always to provide you with excellent care. Hearing back from our patients is one way we can continue to improve our services. Please take a few minutes to complete the written survey that you may receive in the mail after you visit with us. Thank you!        Patient Information     Date Of Birth          1945        About your hospital stay     You were admitted on:  August 3, 2018 You last received care in the:  [facility]    You were discharged on:  August 3, 2018       Who to Call     For medical emergencies, please call 911.  For non-urgent questions about your medical care, please call your primary care provider or clinic, [phone]  For questions related to your surgery, please call your surgery clinic        Attending Provider     Provider Ramana Garcia DO Orthopedics       Primary Care Provider Office Phone # Fax #    Ckwfcrly Brad Walters [phone]      After Care Instructions      Diet as Tolerated       Return to diet before surgery, unless instructed otherwise.            Discharge Instructions       Review outpatient procedure discharge instructions with patient as directed by Provider            Discharge Instructions - Lifting Limit (specify)       Lifting limit  5 pounds until seen at Post-op follow up appointment.            Dressing Change       Change dressing on third day after surgery.            Ice to affected area       Ice pack to surgical site every 15 minutes per hour for 24 hours            No Alcohol       For 24 hours post procedure or if taking pain medications            No driving or operating machinery        until further notice            Notify Provider       For signs and symptoms of infection: Fever  greater than 101, redness, swelling, heat at site, drainage, pus.            Return to clinic       Return to clinic in 10 days            Weight bearing - As tolerated           Wound care       Do not immerse wound in water until sutures removed                  Your next 10 appointments already scheduled     Aug 13, 2018  2:10 PM CDT   [facility]    [address] [id number] [phone]              Further instructions from your care team       General Knee Arthroscopy Discharge Instructions                                       [phone]  Bone and Joint Service Line for issues or concerns        General Care:  After surgery you may feel tired/sleepy. This is normal. Please have someone stay with you for 24 hours after surgery. You should avoid driving for 1-2 days after surgery, as your reaction time may be slow. You should not drive at all if you have had surgery on your arms, right leg and/or are taking narcotic pain medications until released by your doctor. If you have any question along the way please contact the office. If you feel it is an issue cannot wait for normal office hours, contact the on-call physician.  Elevate your leg with a couple of pillows placed under your ankle/calf area. Do this for the first couple days frequently.     Bandages:   Change your bandage after the first 72 hours. You may use Band-Aids or sterile gauze with a small amount of tape. It s normal to have some blood-tinged fluid on your bandages, this will usually continue for the first day or two. Keep the area clean and dry. Do not apply any ointments. Use the ACE wrap from your foot to thigh until you are seen at your follow up.     Bathing:  Do not submerge your incision in water such as a bath or pool. It is ok to shower after removing your initial bandage after the first 2 days from surgery. Avoid any  excessively hot showers, baths, or hot tubes after surgery.     Follow up:  Your follow up appointment should already be scheduled. If its not, please call the office to verify an appointment 10 days after surgery.     Diet:  Start with non-alcoholic liquids at first, particularly water or sports drinks after surgery. Progress to bland foods such as crackers and bread and finally to your normal diet if you have no problems.     Pain control:  Take your pain medications as prescribed. These medications may make you sleepy. Do not drive, operate equipment, or drink alcohol when taking these.  You may take Tylenol (Generic name is acetaminophen) or NSAIDs (Motrin, Ibuprofen, Aleve, Naproxen) as directed on the bottle for additional relief or in place of the prescribed pain medications as your pain gets better. If the medications cause a reaction such as nausea or skin rash, stop taking them and contact your doctor. Please plan accordingly, pain medications will not be re-filled on the weekends or at night. Call the office during the day if you need more medications.    Crutches:  Use crutches/walker/cane only if needed after surgery. You can stop using these when you feel stable on your feet.     Braces:  Some surgeries will require the use of a brace. Use this brace as directed.         Physical Therapy:  Depending on your surgery, physical therapy may start within a few days or be delayed 4-6 weeks. At your first post-operative visit, your doctor will direct you on your personal therapy program if needed.     Activity:  Unless otherwise instructed, you can weight bear as tolerated. While laying or sitting down you should straighten your knee all the way out and then gently work on bending the knee back. Do not worry at first if your knee feels stiff and will not bend like normal, this will get better.     Normal findings after surgery:  Numbness and tenderness around the incisions is normal.  You may have bruising  around the incisions.  Your knee will be swollen for 3 weeks after surgery. It will feel  tight  to move.   Low grade fevers less than 100.5 degrees Fahrenheit are normal.       When to call the Office:  [phone]  Temperature greater than 101.5 degrees Fahreheit.  Fever, chills, and increasing pain in the knee.  Excessive drainage from the incisions that include bright red blood.  Drainage from the incisions sites that appear yellow, pus-like, or foul smelling.  Increasing pain the knee not relieved by the prescribed pain medications or ice.  Pain or swelling in your calf area (in back above your ankle)  Any other effects you feel are significant.    Same-Day Surgery Anesthesia  Adult Discharge Orders & Instructions     For 24 hours after surgery    1. Get plenty of rest.  A responsible adult must stay with you for at least 24 hours after you leave the hospital.   2. Do not drive or use heavy equipment.  If you have weakness or tingling, don't drive or use heavy equipment until this feeling goes away.  3. Do not drink alcohol.  4. Avoid strenuous or risky activities.  Ask for help when climbing stairs.   5. You may feel lightheaded.  If so, sit for a few minutes before standing.  Have someone help you get up.   6. You may have a slight fever. Call the doctor if your fever is over 100 F (37.7 C) (taken under the tongue) or lasts longer than 24 hours.  7. You may have a dry mouth, a sore throat, muscle aches or trouble sleeping.  These should go away after 24 hours.  8. Do not make important or legal decisions.  Based on the surgery/procedure that you had today, we do not expect that you will have any problems.  However, we want you to know what to do if you have pain, nausea, bleeding,or infection:  To control pain:  Take medicines your physician has prescribed or or over-the counter medicine he or she advises.  Ice packs and periods of rest are often helpful.  For surgery on an arm or leg, raise it on a pillow  to ease swelling.  If your pain is not managed with the above methods, contact your physician.  To control nausea:  Take anti-nausea medicine approved by your physician.  Drink clear liquids such as apple juice, ginger ale, broth or 7-Up. Be sure to drink enough fluids.  Move to a regular diet as you feel able.  Rest may also help.  Bleeding:  You may see a little blood on your dressing, about the size of a quarter in the first 24 hours.  If you see this, there is no reason to be alarmed.  However, if this continues to increase in size, apply pressure if able, and notify your physician.  Infection: Please contact your physician if you have any of the following signs:  redness, swelling, heat, increasing pain or foul-smelling drainage at your surgery site, fever or chills,     Call your doctor for any of the followin.  It has been over 8 to 10 hours since surgery and you are still not able to urinate (pass water).    2.  Headache for over 24 hours.    3.  Numbness, tingling or weakness in your legs the day after surgery (if you had spinal anesthesia).    Nurse advice line: [phone]      Pending Results     No orders found from 2018 to 2018.            Admission Information     Date & Time Provider Department Dept. Phone    8/3/2018 Ramana Dominguez DO [facility] Phase [phone]      Your Vitals Were     Blood Pressure Temperature Respirations Pulse Oximetry          134/70 98.1  F (36.7  C) (Oral) 16 95%        Care EveryWhere ID     This is your Care EveryWhere ID. This could be used by other organizations to access your [place] medical records  [id number]        Equal Access to Services     [facility]: Hadii princess stein hadasho Soomaali, waaxda luqadaha, qaybta kaalmada adehannah, blanche liz. So [facility] [phone].    ATENCIÓN: Si habla español, tiene a morales disposición servicios gratuitos de asistencia lingüística. Llame al [phone].    We  comply with applicable federal civil rights laws and [place] laws. We do not discriminate on the basis of race, color, national origin, age, disability, sex, sexual orientation, or gender identity.               Review of your medicines      START taking        Dose / Directions    methocarbamol 750 MG tablet   Commonly known as:  ROBAXIN   Used for:  Complex tear of medial meniscus of right knee as current injury, initial encounter        Dose:  750 mg   Take 1 tablet (750 mg) by mouth every 6 hours as needed for muscle spasms (muscle spasm)   Quantity:  30 tablet   Refills:  1       oxyCODONE IR 5 MG tablet   Commonly known as:  ROXICODONE   Used for:  Complex tear of medial meniscus of right knee as current injury, initial encounter        Dose:  5-10 mg   Take 1-2 tablets (5-10 mg) by mouth every 6 hours as needed for pain or other (Moderate to Severe)   Quantity:  20 tablet   Refills:  0         CONTINUE these medicines which have NOT CHANGED        Dose / Directions    ADVIL 200 MG tablet   Generic drug:  ibuprofen        Reported on 5/2/2017   Quantity:  120   Refills:  0       aspirin 81 MG tablet        Take by mouth daily   Quantity:  30 tablet   Refills:  0       clobetasol 0.05 % cream   Commonly known as:  TEMOVATE   Used for:  Yeast dermatitis        Small amount to affected areas BID   Quantity:  60 g   Refills:  1       desoximetasone 0.25 % cream   Commonly known as:  TOPICORT   Used for:  Rash        use as directed to legs   Quantity:  100 g   Refills:  1       ezetimibe 10 MG tablet   Commonly known as:  ZETIA   Used for:  Hyperlipidemia LDL goal <130        Dose:  10 mg   Take 1 tablet (10 mg) by mouth daily   Quantity:  90 tablet   Refills:  3       finasteride 5 MG tablet   Commonly known as:  PROSCAR   Used for:  Benign prostatic hyperplasia with urinary frequency        Dose:  5 mg   Take 1 tablet (5 mg) by mouth daily   Quantity:  90 tablet   Refills:  3       losartan-hydrochlorothiazide  100-25 MG per tablet   Commonly known as:  HYZAAR   Used for:  Essential hypertension with goal blood pressure less than 140/90        Dose:  1 tablet   Take 1 tablet by mouth daily   Quantity:  90 tablet   Refills:  3       metFORMIN 500 MG tablet   Commonly known as:  GLUCOPHAGE   Used for:  Controlled type 2 diabetes mellitus without complication, without long-term current use of insulin (H)        Dose:  500 mg   Take 1 tablet (500 mg) by mouth 2 times daily (with meals)   Quantity:  180 tablet   Refills:  3       metoprolol succinate 100 MG 24 hr tablet   Commonly known as:  TOPROL-XL   Used for:  Essential hypertension with goal blood pressure less than 140/90        Dose:  100 mg   Take 1 tablet (100 mg) by mouth daily   Quantity:  90 tablet   Refills:  3       NIFEdipine ER 60 MG 24 hr tablet   Commonly known as:  ADALAT CC   Used for:  Benign essential hypertension        Dose:  60 mg   Take 1 tablet (60 mg) by mouth daily   Quantity:  90 tablet   Refills:  3       order for DME   Used for:  LUCAS (obstructive sleep apnea), Morbid obesity with BMI of 45.0-49.9, adult (H)        Equipment being ordered: CPAP and related hardware, mask and tubing as well heated humidity equipment.   Quantity:  1 Units   Refills:  0       TYLENOL PO        As needed   Refills:  0       vitamin D 2000 units tablet        Dose:  1 tablet   Take 1 tablet by mouth daily.   Refills:  0       vitamin E 400 UNIT capsule        Refills:  0            Where to get your medicines      Some of these will need a paper prescription and others can be bought over the counter. Ask your nurse if you have questions.     Bring a paper prescription for each of these medications     methocarbamol 750 MG tablet    oxyCODONE IR 5 MG tablet                Protect others around you: Learn how to safely use, store and throw away your medicines at www.disposemymeds.org.        Information about OPIOIDS     PRESCRIPTION OPIOIDS: WHAT YOU NEED TO KNOW   We  gave you an opioid (narcotic) pain medicine. It is important to manage your pain, but opioids are not always the best choice. You should first try all the other options your care team gave you. Take this medicine for as short a time (and as few doses) as possible.     These medicines have risks:    DO NOT drive when on new or higher doses of pain medicine. These medicines can affect your alertness and reaction times, and you could be arrested for driving under the influence (DUI). If you need to use opioids long-term, talk to your care team about driving.    DO NOT operate heave machinery    DO NOT do any other dangerous activities while taking these medicines.     DO NOT drink any alcohol while taking these medicines.      If the opioid prescribed includes acetaminophen, DO NOT take with any other medicines that contain acetaminophen. Read all labels carefully. Look for the word  acetaminophen  or  Tylenol.  Ask your pharmacist if you have questions or are unsure.    You can get addicted to pain medicines, especially if you have a history of addiction (chemical, alcohol or substance dependence). Talk to your care team about ways to reduce this risk.    Store your pills in a secure place, locked if possible. We will not replace any lost or stolen medicine. If you don t finish your medicine, please throw away (dispose) as directed by your pharmacist. The [place] [employer]ution [employer] has more information about safe disposal: https://www.pca.[facility].[place].us/living-green/managing-unwanted-medications.     All opioids tend to cause constipation. Drink plenty of water and eat foods that have a lot of fiber, such as fruits, vegetables, prune juice, apple juice and high-fiber cereal. Take a laxative (Miralax, milk of magnesia, Colace, Senna) if you don t move your bowels at least every other day.              Medication List: This is a list of all your medications and when to take them. Check marks below indicate your  daily home schedule. Keep this list as a reference.      Medications           Morning Afternoon Evening Bedtime As Needed    ADVIL 200 MG tablet   Reported on 5/2/2017   Generic drug:  ibuprofen                                aspirin 81 MG tablet   Take by mouth daily                                clobetasol 0.05 % cream   Commonly known as:  TEMOVATE   Small amount to affected areas BID                                desoximetasone 0.25 % cream   Commonly known as:  TOPICORT   use as directed to legs                                ezetimibe 10 MG tablet   Commonly known as:  ZETIA   Take 1 tablet (10 mg) by mouth daily                                finasteride 5 MG tablet   Commonly known as:  PROSCAR   Take 1 tablet (5 mg) by mouth daily                                losartan-hydrochlorothiazide 100-25 MG per tablet   Commonly known as:  HYZAAR   Take 1 tablet by mouth daily                                metFORMIN 500 MG tablet   Commonly known as:  GLUCOPHAGE   Take 1 tablet (500 mg) by mouth 2 times daily (with meals)                                methocarbamol 750 MG tablet   Commonly known as:  ROBAXIN   Take 1 tablet (750 mg) by mouth every 6 hours as needed for muscle spasms (muscle spasm)                                metoprolol succinate 100 MG 24 hr tablet   Commonly known as:  TOPROL-XL   Take 1 tablet (100 mg) by mouth daily                                NIFEdipine ER 60 MG 24 hr tablet   Commonly known as:  ADALAT CC   Take 1 tablet (60 mg) by mouth daily                                order for DME   Equipment being ordered: CPAP and related hardware, mask and tubing as well heated humidity equipment.                                oxyCODONE IR 5 MG tablet   Commonly known as:  ROXICODONE   Take 1-2 tablets (5-10 mg) by mouth every 6 hours as needed for pain or other (Moderate to Severe)                                TYLENOL PO   As needed                                vitamin D 2000 units  tablet   Take 1 tablet by mouth daily.                                vitamin E 400 UNIT capsule

## 2018-08-03 NOTE — DISCHARGE INSTRUCTIONS
General Knee Arthroscopy Discharge Instructions                                       [phone]  B[facility] and Joint Service Line for issues or concerns        General Care:  After surgery you may feel tired/sleepy. This is normal. Please have someone stay with you for 24 hours after surgery. You should avoid driving for 1-2 days after surgery, as your reaction time may be slow. You should not drive at all if you have had surgery on your arms, right leg and/or are taking narcotic pain medications until released by your doctor. If you have any question along the way please contact the office. If you feel it is an issue cannot wait for normal office hours, contact the on-call physician.  Elevate your leg with a couple of pillows placed under your ankle/calf area. Do this for the first couple days frequently.     Bandages:   Change your bandage after the first 72 hours. You may use Band-Aids or sterile gauze with a small amount of tape. It s normal to have some blood-tinged fluid on your bandages, this will usually continue for the first day or two. Keep the area clean and dry. Do not apply any ointments. Use the ACE wrap from your foot to thigh until you are seen at your follow up.     Bathing:  Do not submerge your incision in water such as a bath or pool. It is ok to shower after removing your initial bandage after the first 2 days from surgery. Avoid any excessively hot showers, baths, or hot tubes after surgery.     Follow up:  Your follow up appointment should already be scheduled. If its not, please call the office to verify an appointment 10 days after surgery.     Diet:  Start with non-alcoholic liquids at first, particularly water or sports drinks after surgery. Progress to bland foods such as crackers and bread and finally to your normal diet if you have no problems.     Pain control:  Take your pain medications as prescribed. These medications may make you sleepy. Do not drive, operate equipment, or drink  alcohol when taking these.  You may take Tylenol (Generic name is acetaminophen) or NSAIDs (Motrin, Ibuprofen, Aleve, Naproxen) as directed on the bottle for additional relief or in place of the prescribed pain medications as your pain gets better. If the medications cause a reaction such as nausea or skin rash, stop taking them and contact your doctor. Please plan accordingly, pain medications will not be re-filled on the weekends or at night. Call the office during the day if you need more medications.    Crutches:  Use crutches/walker/cane only if needed after surgery. You can stop using these when you feel stable on your feet.     Braces:  Some surgeries will require the use of a brace. Use this brace as directed.         Physical Therapy:  Depending on your surgery, physical therapy may start within a few days or be delayed 4-6 weeks. At your first post-operative visit, your doctor will direct you on your personal therapy program if needed.     Activity:  Unless otherwise instructed, you can weight bear as tolerated. While laying or sitting down you should straighten your knee all the way out and then gently work on bending the knee back. Do not worry at first if your knee feels stiff and will not bend like normal, this will get better.     Normal findings after surgery:  Numbness and tenderness around the incisions is normal.  You may have bruising around the incisions.  Your knee will be swollen for 3 weeks after surgery. It will feel  tight  to move.   Low grade fevers less than 100.5 degrees Fahrenheit are normal.       When to call the Office:  [phone]  Temperature greater than 101.5 degrees Fahreheit.  Fever, chills, and increasing pain in the knee.  Excessive drainage from the incisions that include bright red blood.  Drainage from the incisions sites that appear yellow, pus-like, or foul smelling.  Increasing pain the knee not relieved by the prescribed pain medications or ice.  Pain or swelling in  your calf area (in back above your ankle)  Any other effects you feel are significant.    Same-Day Surgery Anesthesia  Adult Discharge Orders & Instructions     For 24 hours after surgery    1. Get plenty of rest.  A responsible adult must stay with you for at least 24 hours after you leave the hospital.   2. Do not drive or use heavy equipment.  If you have weakness or tingling, don't drive or use heavy equipment until this feeling goes away.  3. Do not drink alcohol.  4. Avoid strenuous or risky activities.  Ask for help when climbing stairs.   5. You may feel lightheaded.  If so, sit for a few minutes before standing.  Have someone help you get up.   6. You may have a slight fever. Call the doctor if your fever is over 100 F (37.7 C) (taken under the tongue) or lasts longer than 24 hours.  7. You may have a dry mouth, a sore throat, muscle aches or trouble sleeping.  These should go away after 24 hours.  8. Do not make important or legal decisions.  Based on the surgery/procedure that you had today, we do not expect that you will have any problems.  However, we want you to know what to do if you have pain, nausea, bleeding,or infection:  To control pain:  Take medicines your physician has prescribed or or over-the counter medicine he or she advises.  Ice packs and periods of rest are often helpful.  For surgery on an arm or leg, raise it on a pillow to ease swelling.  If your pain is not managed with the above methods, contact your physician.  To control nausea:  Take anti-nausea medicine approved by your physician.  Drink clear liquids such as apple juice, ginger ale, broth or 7-Up. Be sure to drink enough fluids.  Move to a regular diet as you feel able.  Rest may also help.  Bleeding:  You may see a little blood on your dressing, about the size of a quarter in the first 24 hours.  If you see this, there is no reason to be alarmed.  However, if this continues to increase in size, apply pressure if able, and  notify your physician.  Infection: Please contact your physician if you have any of the following signs:  redness, swelling, heat, increasing pain or foul-smelling drainage at your surgery site, fever or chills,     Call your doctor for any of the followin.  It has been over 8 to 10 hours since surgery and you are still not able to urinate (pass water).    2.  Headache for over 24 hours.    3.  Numbness, tingling or weakness in your legs the day after surgery (if you had spinal anesthesia).    Nurse advice line: [phone]

## 2018-08-03 NOTE — ADDENDUM NOTE
Addendum  created 08/03/18 1107 by Lea Mcdaniels APRN CRNA    Anesthesia Review and Sign - Ready for Procedure, Anesthesia Review and Sign - Signed

## 2018-08-03 NOTE — ANESTHESIA POSTPROCEDURE EVALUATION
Patient: Ben Ceron    Procedure(s):  Right knee arthroscopy with partial meniscectomy - Wound Class: I-Clean    Diagnosis:Right knee meniscus tear  Diagnosis Additional Information: No value filed.    Anesthesia Type:  LMA, General    Note:  Anesthesia Post Evaluation    Patient location during evaluation: PACU  Patient participation: Able to fully participate in evaluation  Level of consciousness: awake and alert  Pain management: adequate  Airway patency: patent  Cardiovascular status: acceptable  Respiratory status: acceptable  Hydration status: acceptable  PONV: none             Last vitals:  Vitals:    08/03/18 0927 08/03/18 1054   BP: 135/77 110/69   Resp: 20 20   Temp: 98.1  F (36.7  C)    SpO2: 97%          Electronically Signed By: NELLY Hurd CRNA  August 3, 2018  10:59 AM

## 2018-08-03 NOTE — ANESTHESIA PREPROCEDURE EVALUATION
Anesthesia Evaluation     . Pt has had prior anesthetic. Type: General and MAC    No history of anesthetic complications          ROS/MED HX    ENT/Pulmonary:     (+)sleep apnea, uses CPAP , . .    Neurologic:  - neg neurologic ROS     Cardiovascular:     (+) Dyslipidemia, hypertension-range: BP<140/90, ---. : . . . :. . Previous cardiac testing date:results:date: results:ECG reviewed date:8/1/18 results:Sinus Rhythm date: results:          METS/Exercise Tolerance:  >4 METS   Hematologic:         Musculoskeletal:   (+) arthritis (left knee pain), , , - osteoarthritis      GI/Hepatic:  - neg GI/hepatic ROS       Renal/Genitourinary:  - ROS Renal section negative       Endo:     (+) Obesity, .      Psychiatric:  - neg psychiatric ROS       Infectious Disease:  - neg infectious disease ROS       Malignancy:      - no malignancy   Other:    (+) C-spine cleared: N/A, no H/O Chronic Pain,  - neg other ROS                 Physical Exam  Normal systems: cardiovascular, pulmonary and dental    Airway   Mallampati: II  TM distance: >3 FB  Neck ROM: full    Dental     Cardiovascular   Rhythm and rate: regular and normal      Pulmonary    breath sounds clear to auscultation    Other findings: Aspirin taken Monday 7/30/18; Losartan/HCTZ, metformin, Nifedipine taken yesterday 8/2/18; Metoprolol taken this AM 8/3/18                Anesthesia Plan      History & Physical Review  History and physical reviewed and following examination; no interval change.    ASA Status:  2 .    NPO Status:  > 8 hours    Plan for LMA and General with Intravenous and Propofol induction. Maintenance will be Inhalation and Balanced.    PONV prophylaxis:  Ondansetron (or other 5HT-3) and Dexamethasone or Solumedrol       Postoperative Care  Postoperative pain management:  IV analgesics.      Consents  Anesthetic plan, risks, benefits and alternatives discussed with:  Patient..                          .

## 2018-08-03 NOTE — ANESTHESIA CARE TRANSFER NOTE
Patient: Ben Ceron    Procedure(s):  Right knee arthroscopy with partial meniscectomy - Wound Class: I-Clean    Diagnosis: Right knee meniscus tear  Diagnosis Additional Information: No value filed.    Anesthesia Type:   LMA, General     Note:  Airway :Face Mask  Patient transferred to:PACU  Handoff Report: Identifed the Patient, Identified the Reponsible Provider, Reviewed the pertinent medical history, Discussed the surgical course, Reviewed Intra-OP anesthesia mangement and issues during anesthesia, Set expectations for post-procedure period and Allowed opportunity for questions and acknowledgement of understanding      Vitals: (Last set prior to Anesthesia Care Transfer)    CRNA VITALS  8/3/2018 1021 - 8/3/2018 1058      8/3/2018             Pulse: 68    SpO2: 98 %                Electronically Signed By: NELLY Hurd CRNA  August 3, 2018  10:58 AM

## 2018-08-03 NOTE — INTERVAL H&P NOTE
This H&P has been reviewed and there are no clinically significant changes in the patient s condition.  The patient was evaluated by myself as well as Dr. Tristan Walters prior to surgery. The Patient is approved for the surgery and the stated surgical procedure is still clinically indicated.

## 2018-08-03 NOTE — IP AVS SNAPSHOT
[facility]    [address]     [place] [address]    Phone:  [phone]                                       After Visit Summary   8/3/2018    Ben Ceron    MRN: [id number]           After Visit Summary Signature Page     I have received my discharge instructions, and my questions have been answered. I have discussed any challenges I see with this plan with the nurse or doctor.    ..........................................................................................................................................  Patient/Patient Representative Signature      ..........................................................................................................................................  Patient Representative Print Name and Relationship to Patient    ..................................................               ................................................  Date                                            Time    ..........................................................................................................................................  Reviewed by Signature/Title    ...................................................              ..............................................  Date                                                            Time

## 2018-08-03 NOTE — OP NOTE
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      PROCEDURE:  Right knee arthroscopy with arthroscopic partial medial meniscectomy.      SURGEON:  Ramana Dominguez DO      ASSISTANT:  NELLY Lane, CNP      ANESTHESIA:  General.      COMPLICATIONS:  None.      ESTIMATED BLOOD LOSS:  Less than 10 mL.      FLUIDS:  800 mL crystalloids.      COUNTS:  Correct.      DISPOSITION:  PACU in stable condition.      GROSS FINDINGS:  The patellofemoral compartment had some grade 3 chondromalacia, particularly along the trochlea as well as grade 2 changes of the patella.  Medial compartment showed some grade 2, grade 3 changes with a complex tear of the posterior horn of medial meniscus.  ACL was stable with probing.  Lateral compartment just showed some softening and fissuring of the lateral compartment articular cartilage with no full-thickness defects.  The meniscus was intact.      INDICATIONS:  This is a 72-year-old male with complaints of right knee pain.  Well known to myself as I performed a knee arthroscopy to his contralateral knee.  Complains of a medial sharp pain.  Similar to his contralateral side but more intense.  He had attempted some rest, Tylenol, ice, crutches with no improvement.  MRI as well as regular radiographs demonstrate some degenerative changes with some grade 3 chondromalacia of the medial compartment associated with a meniscus tear.  I discussed the diagnosis of chondromalacia with meniscus tear and reviewed the options.  We discussed conservative including corticosteroid injections, physical therapy.  Once I reviewed this, he wanted to proceed surgically.  It felt similar to his other knee and had done very well after surgery.  I had a lengthy discussion regarding limitations of surgery in the setting of this chondromalacia.  I reviewed the  potential to worsen the progression of arthritis.  However, once the full risks and benefits were discussed of both surgery and conservative, he opted to proceed surgically.      DETAILS OF PROCEDURE:  He was met preoperatively, again informed consent was verified, appropriate site was marked.  He was wheeled to operative suite #1.  Transferred to the OR table without any issues.  When deemed appropriate by Anesthesia, the right lower extremity was sterilely prepped and draped in normal manner.  Prior to incision, a timeout was performed.  Again, the appropriate patient, surgery and extremity were verified and antibiotics administered.  A lateral portal was established, the trocar was gradually easily introduced intraarticular with no significant resistance.  The above gross finding changes were noted.  I utilized a combination of biter and a shaver to smooth the medial meniscus to a stable margin.  The knee was carefully probed in all 3 compartments and as noted above there was no other meniscus tear.  There were no loose flaps of articular cartilage.  The knee was copiously irrigated, suctioned dry, instruments were removed.  Portal sites were closed with nylon and a sterile bandage was applied.  He subsequently transferred to the PACU in stable condition.  He will be discharged home with oxycodone and Robaxin for pain.  He already has Tylenol at home.  Followup appointment has been scheduled, discharge instructions provided.         LULA WALKER DO             D: 2018   T: 2018   MT: CRISELDA      Name:     ISABEL ZAMAN   MRN:      [id number]        Account:        [id number]   :      1945           Procedure Date: 2018      Document: [id number]

## 2018-08-10 NOTE — PROGRESS NOTES
Please contact the patient and notify him of the following:  The urine sample is negative.  The blood cell count shows an elevated white count at 12.5.  This is essentially unchanged from a year ago.  We will want to check this sometime in the future.      Thank you.   DO ELIZABETH Tran

## 2018-08-13 ENCOUNTER — OFFICE VISIT (OUTPATIENT)
Dept: ORTHOPEDICS | Facility: CLINIC | Age: 73
End: 2018-08-13
Payer: COMMERCIAL

## 2018-08-13 VITALS
BODY MASS INDEX: 43.35 KG/M2 | WEIGHT: 286 LBS | HEART RATE: 68 BPM | HEIGHT: 68 IN | DIASTOLIC BLOOD PRESSURE: 74 MMHG | SYSTOLIC BLOOD PRESSURE: 116 MMHG

## 2018-08-13 DIAGNOSIS — Z98.890 S/P ARTHROSCOPY OF RIGHT KNEE: Primary | ICD-10-CM

## 2018-08-13 DIAGNOSIS — S83.231A COMPLEX TEAR OF MEDIAL MENISCUS OF RIGHT KNEE AS CURRENT INJURY, INITIAL ENCOUNTER: ICD-10-CM

## 2018-08-13 PROCEDURE — 99024 POSTOP FOLLOW-UP VISIT: CPT | Performed by: PHYSICIAN ASSISTANT

## 2018-08-13 ASSESSMENT — PAIN SCALES - GENERAL: PAINLEVEL: NO PAIN (0)

## 2018-08-13 NOTE — PROGRESS NOTES
"HISTORY OF PRESENT ILLNESS:    Ben Ceron is a 72 year old male who is seen in follow up for   Chief Complaint   Patient presents with     MICHELLE Dominguez patient; Right knee arthroscopy with partial meniscectomy; DOS; 8/3/18, 10 days post op.     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 PROCEDURE:  Right knee arthroscopy with arthroscopic partial medial meniscectomy.    Interval: Patient reports his right knee is doing quite well postoperatively.  He states he is surprised how well he is doing.  He has good return of range of motion.  Patient has limited swelling from the surgery, and he reports he is at baseline.  Patient states he stopped using crutches about 5 days ago  Pain control: No pain since surgery.  Patient evaluation done with Dr. Ayala    Physical Exam:  Vitals: /74 (BP Location: Left arm, Patient Position: Chair, Cuff Size: Adult Large)  Pulse 68  Ht 1.727 m (5' 8\")  Wt 129.7 kg (286 lb)  BMI 43.49 kg/m2  BMI= Body mass index is 43.49 kg/(m^2).  Constitutional: healthy, alert and no acute distress   Psychiatric: mentation appears normal and affect normal/bright  NEURO: no focal deficits  Location of surgery: Right knee, bilateral lower extremities have edema present.  Patient reports this is consistent with prior to surgery baseline.  Incision sites: Sutures removed today  Range of motion: Full extension and flexion 120  plus  Patient ambulation without a limp    IMAGING INTERPRETATION:  Intra-op pictures were reviewed with patient and a copy given to him.  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  Other areas of knee with age-appropriate degeneration     ASSESSMENT:    Chief Complaint   Patient presents with     RECHECK     Dr. Dominguez patient; Right knee arthroscopy with partial meniscectomy; DOS; 8/3/18, 10 " days post op.     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 PROCEDURE:  Right knee arthroscopy with arthroscopic partial medial meniscectomy.        ICD-10-CM    1. S/P arthroscopy of right knee Z98.890    2. Complex tear of medial meniscus of right knee as current injury, initial encounter S83.231A      Patient is 10 days status post right knee arthroscopy with medial meniscus debridement.  Patient is progressing as expected and doing quite well.    Plan:   Sutures removed this visit.  Refill pain medication: Not utilizing.  Physical Therapy: No formal therapy needed at this time.  Patient is shown how to do a one legged squat exercise that he should do every other day to regain his strength.  Continue elevation of affected extremity as needed for swelling  Return to clinic 4 weeks if he has any further questions or concerns.    BP Readings from Last 1 Encounters:   08/13/18 116/74     BP noted to be well controlled today in office.     Mecca Lynch PA-C   8/13/2018  2:50 PM      I attest I have seen and evaluated the patient.  I agree with above impression and plan.    Rusty Ayala MD

## 2018-08-13 NOTE — LETTER
"    8/13/2018         RE: Ben Ceron  [address] [facility] [address]        Dear Colleague,    Thank you for referring your patient, Ben Ceron, to the [facility]. Please see a copy of my visit note below.    HISTORY OF PRESENT ILLNESS:    Ben Ceron is a 72 year old male who is seen in follow up for   Chief Complaint   Patient presents with     RECHECK     Dr. Dominguez patient; Right knee arthroscopy with partial meniscectomy; DOS; 8/3/18, 10 days post op.     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 PROCEDURE:  Right knee arthroscopy with arthroscopic partial medial meniscectomy.    Interval: Patient reports his right knee is doing quite well postoperatively.  He states he is surprised how well he is doing.  He has good return of range of motion.  Patient has limited swelling from the surgery, and he reports he is at baseline.  Patient states he stopped using crutches about 5 days ago  Pain control: No pain since surgery.  Patient evaluation done with Dr. Ayala    Physical Exam:  Vitals: /74 (BP Location: Left arm, Patient Position: Chair, Cuff Size: Adult Large)  Pulse 68  Ht 1.727 m (5' 8\")  Wt 129.7 kg (286 lb)  BMI 43.49 kg/m2  BMI= Body mass index is 43.49 kg/(m^2).  Constitutional: healthy, alert and no acute distress   Psychiatric: mentation appears normal and affect normal/bright  NEURO: no focal deficits  Location of surgery: Right knee, bilateral lower extremities have edema present.  Patient reports this is consistent with prior to surgery baseline.  Incision sites: Sutures removed today  Range of motion: Full extension and flexion 120  plus  Patient ambulation without a limp    IMAGING INTERPRETATION:  Intra-op pictures were reviewed with patient and a copy given to him.  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  Other areas of knee with age-appropriate degeneration     ASSESSMENT:    Chief Complaint   Patient presents with     RECHECK     Dr. Dominguez patient; Right knee arthroscopy with partial meniscectomy; DOS; 8/3/18, 10 days post op.     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 PROCEDURE:  Right knee arthroscopy with arthroscopic partial medial meniscectomy.        ICD-10-CM    1. S/P arthroscopy of right knee Z98.890    2. Complex tear of medial meniscus of right knee as current injury, initial encounter S83.231A      Patient is 10 days status post right knee arthroscopy with medial meniscus debridement.  Patient is progressing as expected and doing quite well.    Plan:   Sutures removed this visit.  Refill pain medication: Not utilizing.  Physical Therapy: No formal therapy needed at this time.  Patient is shown how to do a one legged squat exercise that he should do every other day to regain his strength.  Continue elevation of affected extremity as needed for swelling  Return to clinic 4 weeks if he has any further questions or concerns.    BP Readings from Last 1 Encounters:   08/13/18 116/74     BP noted to be well controlled today in office.     Mecca Lynch PA-C   8/13/2018  2:50 PM      I attest I have seen and evaluated the patient.  I agree with above impression and plan.    Rusty Ayala MD    Again, thank you for allowing me to participate in the care of your patient.        Sincerely,        Rusty Ayala MD

## 2018-08-13 NOTE — MR AVS SNAPSHOT
"              After Visit Summary   8/13/2018    Ben Ceron    MRN: [id number]           Patient Information     Date Of Birth          1945        Visit Information        Provider Department      8/13/2018 2:10 PM Rusty Ayala MD [facility]         Follow-ups after your visit        Your next 10 appointments already scheduled     Aug 13, 2018  2:10 PM CDT   Return Visit with Rusty Ayala MD   [facility] ([facility])    [address] [place] [id number] [phone]              Who to contact     If you have questions or need follow up information about today's clinic visit or your schedule please contact [facility] directly at [phone].  Normal or non-critical lab and imaging results will be communicated to you by MyChart, letter or phone within 4 business days after the clinic has received the results. If you do not hear from us within 7 days, please contact the clinic through MyChart or phone. If you have a critical or abnormal lab result, we will notify you by phone as soon as possible.  Submit refill requests through [employer] or call your pharmacy and they will forward the refill request to us. Please allow 3 business days for your refill to be completed.          Additional Information About Your Visit        Care EveryWhere ID     This is your Care EveryWhere ID. This could be used by other organizations to access your [place] medical records  [id number]        Your Vitals Were     Pulse Height BMI (Body Mass Index)             68 1.727 m (5' 8\") 43.49 kg/m2          Blood Pressure from Last 3 Encounters:   08/13/18 116/74   08/03/18 134/72   08/01/18 122/76    Weight from Last 3 Encounters:   08/13/18 129.7 kg (286 lb)   08/01/18 129.7 kg (286 lb)   08/01/18 129.7 kg (286 lb)              Today, you had the following     No orders found for display       Primary Care Provider Office Phone # Fax #    " Mina Brad Walters, [phone]       [address] [facility] [address]        Equal Access to Services     REMY SANTAMARIA : Hadii aad ku hadkevanloraine Marielaling, loco rylanerin, olivia kascottda ariel, blanche bernadettein hayaan sujitchad leger darrion liz. So [facility] [phone].    ATENCIÓN: Si habla español, tiene a morales disposición servicios gratuitos de asistencia lingüística. Llame al [phone].    We comply with applicable federal civil rights laws and [place] laws. We do not discriminate on the basis of race, color, national origin, age, disability, sex, sexual orientation, or gender identity.            Thank you!     Thank you for choosing [facility]  for your care. Our goal is always to provide you with excellent care. Hearing back from our patients is one way we can continue to improve our services. Please take a few minutes to complete the written survey that you may receive in the mail after your visit with us. Thank you!             Your Updated Medication List - Protect others around you: Learn how to safely use, store and throw away your medicines at www.disposemymeds.org.          This list is accurate as of 8/13/18  2:04 PM.  Always use your most recent med list.                   Brand Name Dispense Instructions for use Diagnosis    ADVIL 200 MG tablet   Generic drug:  ibuprofen     120    Reported on 5/2/2017        aspirin 81 MG tablet     30 tablet    Take by mouth daily        clobetasol 0.05 % cream    TEMOVATE    60 g    Small amount to affected areas BID    Yeast dermatitis       desoximetasone 0.25 % cream    TOPICORT    100 g    use as directed to legs    Rash       ezetimibe 10 MG tablet    ZETIA    90 tablet    Take 1 tablet (10 mg) by mouth daily    Hyperlipidemia LDL goal <130       finasteride 5 MG tablet    PROSCAR    90 tablet    Take 1 tablet (5 mg) by mouth daily    Benign prostatic hyperplasia with urinary frequency       losartan-hydrochlorothiazide  100-25 MG per tablet    HYZAAR    90 tablet    Take 1 tablet by mouth daily    Essential hypertension with goal blood pressure less than 140/90       metFORMIN 500 MG tablet    GLUCOPHAGE    180 tablet    Take 1 tablet (500 mg) by mouth 2 times daily (with meals)    Controlled type 2 diabetes mellitus without complication, without long-term current use of insulin (H)       methocarbamol 750 MG tablet    ROBAXIN    30 tablet    Take 1 tablet (750 mg) by mouth every 6 hours as needed for muscle spasms (muscle spasm)    Complex tear of medial meniscus of right knee as current injury, initial encounter       metoprolol succinate 100 MG 24 hr tablet    TOPROL-XL    90 tablet    Take 1 tablet (100 mg) by mouth daily    Essential hypertension with goal blood pressure less than 140/90       NIFEdipine ER 60 MG 24 hr tablet    ADALAT CC    90 tablet    Take 1 tablet (60 mg) by mouth daily    Benign essential hypertension       order for DME     1 Units    Equipment being ordered: CPAP and related hardware, mask and tubing as well heated humidity equipment.    LUCAS (obstructive sleep apnea), Morbid obesity with BMI of 45.0-49.9, adult (H)       oxyCODONE IR 5 MG tablet    ROXICODONE    20 tablet    Take 1-2 tablets (5-10 mg) by mouth every 6 hours as needed for pain or other (Moderate to Severe)    Complex tear of medial meniscus of right knee as current injury, initial encounter       TYLENOL PO      As needed        vitamin D 2000 units tablet      Take 1 tablet by mouth daily.        vitamin E 400 UNIT capsule

## 2018-09-17 ENCOUNTER — OFFICE VISIT (OUTPATIENT)
Dept: ORTHOPEDICS | Facility: CLINIC | Age: 73
End: 2018-09-17
Payer: COMMERCIAL

## 2018-09-17 ENCOUNTER — OFFICE VISIT (OUTPATIENT)
Dept: FAMILY MEDICINE | Facility: OTHER | Age: 73
End: 2018-09-17
Payer: COMMERCIAL

## 2018-09-17 ENCOUNTER — RADIANT APPOINTMENT (OUTPATIENT)
Dept: GENERAL RADIOLOGY | Facility: CLINIC | Age: 73
End: 2018-09-17
Attending: ORTHOPAEDIC SURGERY
Payer: COMMERCIAL

## 2018-09-17 ENCOUNTER — OFFICE VISIT (OUTPATIENT)
Dept: CARDIOLOGY | Facility: CLINIC | Age: 73
End: 2018-09-17
Payer: COMMERCIAL

## 2018-09-17 VITALS
DIASTOLIC BLOOD PRESSURE: 72 MMHG | OXYGEN SATURATION: 96 % | BODY MASS INDEX: 45.27 KG/M2 | SYSTOLIC BLOOD PRESSURE: 134 MMHG | HEART RATE: 74 BPM | HEIGHT: 68 IN | WEIGHT: 298.7 LBS

## 2018-09-17 VITALS
DIASTOLIC BLOOD PRESSURE: 70 MMHG | BODY MASS INDEX: 45.01 KG/M2 | OXYGEN SATURATION: 96 % | TEMPERATURE: 97.8 F | RESPIRATION RATE: 16 BRPM | SYSTOLIC BLOOD PRESSURE: 114 MMHG | WEIGHT: 296 LBS | HEART RATE: 72 BPM

## 2018-09-17 VITALS
HEIGHT: 68 IN | DIASTOLIC BLOOD PRESSURE: 70 MMHG | WEIGHT: 296 LBS | BODY MASS INDEX: 44.86 KG/M2 | SYSTOLIC BLOOD PRESSURE: 114 MMHG

## 2018-09-17 DIAGNOSIS — S46.211A BICEPS MUSCLE TEAR, RIGHT, INITIAL ENCOUNTER: Primary | ICD-10-CM

## 2018-09-17 DIAGNOSIS — I10 ESSENTIAL HYPERTENSION WITH GOAL BLOOD PRESSURE LESS THAN 140/90: ICD-10-CM

## 2018-09-17 DIAGNOSIS — I10 BENIGN ESSENTIAL HYPERTENSION: ICD-10-CM

## 2018-09-17 DIAGNOSIS — S46.211A RUPTURE OF RIGHT DISTAL BICEPS TENDON, INITIAL ENCOUNTER: Primary | ICD-10-CM

## 2018-09-17 DIAGNOSIS — M25.511 SHOULDER PAIN, RIGHT: ICD-10-CM

## 2018-09-17 PROCEDURE — 99213 OFFICE O/P EST LOW 20 MIN: CPT | Performed by: INTERNAL MEDICINE

## 2018-09-17 PROCEDURE — 99214 OFFICE O/P EST MOD 30 MIN: CPT | Mod: 24 | Performed by: ORTHOPAEDIC SURGERY

## 2018-09-17 PROCEDURE — 73030 X-RAY EXAM OF SHOULDER: CPT | Mod: TC

## 2018-09-17 RX ORDER — LOSARTAN POTASSIUM AND HYDROCHLOROTHIAZIDE 25; 100 MG/1; MG/1
1 TABLET ORAL DAILY
Qty: 90 TABLET | Refills: 3 | Status: SHIPPED | OUTPATIENT
Start: 2018-09-17 | End: 2019-05-08

## 2018-09-17 RX ORDER — METOPROLOL SUCCINATE 100 MG/1
100 TABLET, EXTENDED RELEASE ORAL DAILY
Qty: 90 TABLET | Refills: 3 | Status: SHIPPED | OUTPATIENT
Start: 2018-09-17 | End: 2019-03-15

## 2018-09-17 ASSESSMENT — PAIN SCALES - GENERAL: PAINLEVEL: NO PAIN (0)

## 2018-09-17 NOTE — MR AVS SNAPSHOT
"              After Visit Summary   9/17/2018    Ben Ceron    MRN: [id number]           Patient Information     Date Of Birth          1945        Visit Information        Provider Department      9/17/2018 1:30 PM Brad Mcginnis MD [facility]        Today's Diagnoses     Essential hypertension with goal blood pressure less than 140/90        Benign essential hypertension           Follow-ups after your visit        Who to contact     If you have questions or need follow up information about today's clinic visit or your schedule please contact [facility] directly at [phone].  Normal or non-critical lab and imaging results will be communicated to you by MyChart, letter or phone within 4 business days after the clinic has received the results. If you do not hear from us within 7 days, please contact the clinic through MyChart or phone. If you have a critical or abnormal lab result, we will notify you by phone as soon as possible.  Submit refill requests through [employer] or call your pharmacy and they will forward the refill request to us. Please allow 3 business days for your refill to be completed.          Additional Information About Your Visit        Care EveryWhere ID     This is your Care EveryWhere ID. This could be used by other organizations to access your [place] medical records  [id number]        Your Vitals Were     Pulse Height Pulse Oximetry BMI (Body Mass Index)          74 1.727 m (5' 8\") 96% 45.42 kg/m2         Blood Pressure from Last 3 Encounters:   09/17/18 114/70   09/17/18 134/72   09/17/18 114/70    Weight from Last 3 Encounters:   09/17/18 134.3 kg (296 lb)   09/17/18 135.5 kg (298 lb 11.2 oz)   09/17/18 134.3 kg (296 lb)              Today, you had the following     No orders found for display         Where to get your medicines      These medications were sent to [facility] " [id number] - [place], [place] - [address]  [address], [facility] [address]     Phone:  [phone]     losartan-hydrochlorothiazide 100-25 MG per tablet    metoprolol succinate 100 MG 24 hr tablet    NIFEdipine ER 60 MG 24 hr tablet          Primary Care Provider Office Phone # Fax #    Mina Waltesr [phone]       [address] [facility] [address]        Equal Access to Services     REMY SANTAMARIA : Hadii aad ku hadasho Soomaali, waaxda luqadaha, qaybta kaalmada adeegyada, waxay idiin hayaan adeeg kharash la'junior . So [facility] [phone].    ATENCIÓN: Si habla español, tiene a morales disposición servicios gratuitos de asistencia lingüística. [facility] [phone].    We comply with applicable federal civil rights laws and [place] laws. We do not discriminate on the basis of race, color, national origin, age, disability, sex, sexual orientation, or gender identity.            Thank you!     Thank you for choosing [facility]  for your care. Our goal is always to provide you with excellent care. Hearing back from our patients is one way we can continue to improve our services. Please take a few minutes to complete the written survey that you may receive in the mail after your visit with us. Thank you!             Your Updated Medication List - Protect others around you: Learn how to safely use, store and throw away your medicines at www.disposemymeds.org.          This list is accurate as of 9/17/18  2:05 PM.  Always use your most recent med list.                   Brand Name Dispense Instructions for use Diagnosis    ADVIL 200 MG tablet   Generic drug:  ibuprofen     120    Reported on 5/2/2017        aspirin 81 MG tablet     30 tablet    Take by mouth daily        clobetasol 0.05 % cream    TEMOVATE    60 g    Small amount to affected areas BID    Yeast dermatitis       desoximetasone 0.25 % cream    TOPICORT    100 g    use as directed to legs     Rash       ezetimibe 10 MG tablet    ZETIA    90 tablet    Take 1 tablet (10 mg) by mouth daily    Hyperlipidemia LDL goal <130       finasteride 5 MG tablet    PROSCAR    90 tablet    Take 1 tablet (5 mg) by mouth daily    Benign prostatic hyperplasia with urinary frequency       losartan-hydrochlorothiazide 100-25 MG per tablet    HYZAAR    90 tablet    Take 1 tablet by mouth daily    Essential hypertension with goal blood pressure less than 140/90       metFORMIN 500 MG tablet    GLUCOPHAGE    180 tablet    Take 1 tablet (500 mg) by mouth 2 times daily (with meals)    Controlled type 2 diabetes mellitus without complication, without long-term current use of insulin (H)       methocarbamol 750 MG tablet    ROBAXIN    30 tablet    Take 1 tablet (750 mg) by mouth every 6 hours as needed for muscle spasms (muscle spasm)    Complex tear of medial meniscus of right knee as current injury, initial encounter       metoprolol succinate 100 MG 24 hr tablet    TOPROL-XL    90 tablet    Take 1 tablet (100 mg) by mouth daily    Essential hypertension with goal blood pressure less than 140/90       NIFEdipine ER 60 MG 24 hr tablet    ADALAT CC    90 tablet    Take 1 tablet (60 mg) by mouth daily    Benign essential hypertension       order for DME     1 Units    Equipment being ordered: CPAP and related hardware, mask and tubing as well heated humidity equipment.    LUCAS (obstructive sleep apnea), Morbid obesity with BMI of 45.0-49.9, adult (H)       TYLENOL PO      As needed        vitamin D 2000 units tablet      Take 1 tablet by mouth daily.        vitamin E 400 UNIT capsule

## 2018-09-17 NOTE — LETTER
9/17/2018         RE: Ben Ceron  [address] [facility] [address]        Dear Colleague,    Thank you for referring your patient, Ben Ceron, to the [facility]. Please see a copy of my visit note below.    Ben Ceron is a 72 year old male who is seen in consultation at the request of Dr. Walters History of Present illness:  Ben presents for evaluation of:  1.) rt shoulder pain bicep pain   Onset: last week   Symptoms brought on by: bowling.   Character:  swelling.    Progression of symptoms:  better  Previous similar pain: no .   Pain Level:  5/10.   Previous treatments:  ice and acetaminophen.  Currently on Blood thinners? No  Diagnosis of Diabetes? 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  He enjoys bowling and golfing.  He bowls in a leage.    He is being seen for   Chief Complaints and History of Present Illnesses   Patient presents with     Consult     rt shoulder pain per Dr. Walters          History of Present Illness:   Ben Ceron is a 72 year old male who is seen in consultation at the request of Dr. Walters History of Present illness:  Ben presents for evaluation of:  1.) rt shoulder pain bicep pain, the pain is just proximal to his elbow.  Onset: last week, the date of injury is 9/11/2018.  Patient was bowling and threw the ball and heard a pop.  Symptoms brought on by: bowling.   Character:  swelling.   Patient has also noticed some bruising and also that his biceps looks different on the right side than it does on the left side.  He feels it is bunched up.  Patient denies any numbness or tingling.  Progression of symptoms:  better  Previous similar pain: no patient has never had any.  Problems with his right upper extremity such as fracture surgery or trauma.  Pain Level:  5/10.   Previous treatments:  ice and acetaminophen.  Patient has had no further  treatments as of yet.  He has been cautious with it so he did not do more damage to it.  Currently on Blood thinners? No  Diagnosis of Diabetes? Yes     Additional History: Patient does bowl in a coffee league.  He also enjoys golfing.  He is concerned if he will have good function down the road.  He does not want to do further damage.    Patient's past medical, surgical, social and family histories reviewed.     Past Medical History:   Diagnosis Date     Benign non-nodular prostatic hyperplasia, presence of lower urinary tract symptoms unspecified 5/31/2016     Colonic polyps      Family history of colon cancer 4/4/2016    brother with colonoscopy      Hyperlipidemia LDL goal <100 5/3/2017     Hyperlipidemia LDL goal <130 12/8/2016     LUCAS (obstructive sleep apnea) 4/4/2016     Rash 4/4/2016         Past Surgical History:   Procedure Laterality Date     ARTHROSCOPY KNEE WITH MEDIAL MENISCECTOMY  6/6/2014    Procedure: ARTHROSCOPY KNEE WITH MEDIAL MENISCECTOMY;  Surgeon: Ramana Dominguez DO;  Location: [facility] OR     ARTHROSCOPY KNEE WITH MEDIAL MENISCECTOMY Right 8/3/2018    Procedure: ARTHROSCOPY KNEE WITH MEDIAL MENISCECTOMY;  Right knee arthroscopy with partial meniscectomy;  Surgeon: Ramana Dominguez DO;  Location: [facility] OR     COLONOSCOPY  11/29/2011    Polypectomy.     COLONOSCOPY N/A 11/12/2014    Procedure: COLONOSCOPY;  Surgeon: Brooks Burris MD;  Location:  GI     HC COLONOSCOPY W BIOPSY  11/24/08     HC COLONOSCOPY W/WO BRUSH/WASH  11/21/2005    Polypectomy.     HC VASECTOMY UNILAT/BILAT W POSTOP SEMEN  1977    Vasectomy       Medications:    Current Outpatient Prescriptions on File Prior to Visit:  Acetaminophen (TYLENOL PO) As needed   ADVIL 200 MG OR TABS Reported on 5/2/2017   aspirin 81 MG tablet Take by mouth daily   Cholecalciferol (VITAMIN D) 2000 UNITS tablet Take 1 tablet by mouth daily.   clobetasol (TEMOVATE) 0.05 % cream Small amount to affected areas BID   desoximetasone  (TOPICORT) 0.25 % cream use as directed to legs   ezetimibe (ZETIA) 10 MG tablet Take 1 tablet (10 mg) by mouth daily   finasteride (PROSCAR) 5 MG tablet Take 1 tablet (5 mg) by mouth daily   losartan-hydrochlorothiazide (HYZAAR) 100-25 MG per tablet Take 1 tablet by mouth daily   metFORMIN (GLUCOPHAGE) 500 MG tablet Take 1 tablet (500 mg) by mouth 2 times daily (with meals)   methocarbamol (ROBAXIN) 750 MG tablet Take 1 tablet (750 mg) by mouth every 6 hours as needed for muscle spasms (muscle spasm)   metoprolol succinate (TOPROL-XL) 100 MG 24 hr tablet Take 1 tablet (100 mg) by mouth daily   NIFEdipine ER (ADALAT CC) 60 MG 24 hr tablet Take 1 tablet (60 mg) by mouth daily   order for DME Equipment being ordered: CPAP and related hardware, mask and tubing as well heated humidity equipment.   VITAMIN E 400 UNIT OR CAPS    [DISCONTINUED] losartan-hydrochlorothiazide (HYZAAR) 100-25 MG per tablet Take 1 tablet by mouth daily   [DISCONTINUED] metoprolol succinate (TOPROL-XL) 100 MG 24 hr tablet Take 1 tablet (100 mg) by mouth daily   [DISCONTINUED] NIFEdipine ER (ADALAT CC) 60 MG 24 hr tablet Take 1 tablet (60 mg) by mouth daily     No current facility-administered medications on file prior to visit.     Allergies   Allergen Reactions     Norvasc [Amlodipine] Muscle Pain (Myalgia)     Simvastatin Rash     Rash and edema       Social History     Occupational History     [employer]     Social History Main Topics     Smoking status: Never Smoker     Smokeless tobacco: Never Used     Alcohol use 0.0 oz/week     0 Standard drinks or equivalent per week      Comment: occasional     Drug use: No     Sexual activity: Yes     Partners: Female       Family History   Problem Relation Age of Onset     Cancer Mother      Lung Cancer     Cancer - colorectal Father      Stomach Cancer     Cancer Brother      REVIEW OF SYSTEMS  10 point review systems performed otherwise negative as noted as per history  "of present illness.    Physical Exam:  Vitals: /70  Ht 1.727 m (5' 8\")  Wt 134.3 kg (296 lb)  BMI 45.01 kg/m2  BMI= Body mass index is 45.01 kg/(m^2).    Constitutional: healthy, alert and no acute distress   Psychiatric: mentation appears normal and affect normal/bright  NEURO: no focal deficits, CMS intact right upper extremity  RESP: Normal with easy respirations and no use of accessory muscles to breathe, no audible wheezing or retractions  CV: +2 radial pulse and his hand is warm to palpation.   SKIN: No erythema, rashes, excoriation, or breakdown. No evidence of infection.   MUSCULOSKELETAL:    INSPECTION of right elbow: We do notice ecchymosis just proximal to the elbow around the mid arm anteriorly.  It does look like there is a Marciano deformity when compared to the contralateral side.  There is slight swelling also.  No other gross deformities, erythema, edema, ecchymosis, atrophy or fasciculations.     PALPATION: Slight tenderness to palpation over the distal biceps and just proximal to that.  There is no tenderness to palpation over the proximal biceps or shoulder area.  No increased warmth noted.  I do feel that I am able to palpate the distal biceps tendon.    ROM: Patient has full flexion and full extension of the elbow.  He is able to supinate and pronate 90  there is no catching or locking with the range of motion today.     STRENGTH: Negative 4 out of 5 supination strength which causes pain at the elbow today.  Patient has 5 out of 5 pronation strength without any pain.  Patient has 5 out of 5 triceps strength and 5 out of 5 biceps strength with the elbow flexed at approximately 120  but if the elbow is flexed less than 90  there is pain at the elbow with strength testing in the strength is only about negative 4 out of 5.    SPECIAL TEST: None  GAIT: non-antalgic  Lymph: no palpable lymph nodes    Diagnostic Modalities:  X-rays today 3 views of the right shoulder show no fracture no " dislocation no tumor.  There is slight degenerative joint disease in the glenohumeral joint and AC joint.  Slight downsloping acromion.    Independent visualization of the images was performed.    Impression: 1.  6 days status post suspect partial right distal biceps tear    Plan:  All of the above pertinent physical exam and imaging modalities findings was reviewed with Ben.                                          CONSERVATIVE CARE:    Patient Instructions:  1. Xray: your shoulder xrays look good, no fracture, dislocation or tumor. We do see some arthritis at your ac joint and in the shoulder socket.   2. Exam: we can see that your distal bicep is torn, we think partially torn.  3. We can either do non operative treatment or operative treatment.  4. If we do not do surgery, if it is a partial tear, you often get back to full function with slight weakness possibly.  5. If we did surgery, if its a full tear, then it would be 6 weeks in a brace or splint, then 3 months before bowling and 5 months for full recovery.  You should be able to get tearful strength back after this.  6.  We do not feel that this will affect your golf but it is possible it could affect your bowling although you do more of pronation when you spend of onset of a supination so this is good.  7.  We decided to get an MRI to get further clarity of the extent of the tear of the biceps.   8. We have ordered an MRI for you.  Please call [phone] to schedule your MRI.  You will also need to schedule a follow up visit for the results from your MRI with Dr. Montoya.  You may call us at [phone] to schedule this appointment.  Please make this appointment for at least  2-3 days after your MRI.  Re-x-ray on return: Yes on return we should get 3 views of the right elbow despite getting the MRI it is better to keep be thorough and get x-rays also.    BP Readings from Last 1 Encounters:   09/17/18 114/70       BP noted to be well controlled today  in office.      Patient does not use Tobacco products.    Scribed by Brad Ivan PA-C on 9/17/2018 at 2:41 PM, based on Dr. Mahnaz Montoya's statements to me.    This note was dictated with [facility].    JARED Stacy MD      Again, thank you for allowing me to participate in the care of your patient.        Sincerely,        Mahnaz Montoya MD

## 2018-09-17 NOTE — PROGRESS NOTES
Ben Ceron is a 72 year old male who is seen in consultation at the request of Dr. Walters History of Present illness:  Ben presents for evaluation of:  1.) rt shoulder pain bicep pain   Onset: last week   Symptoms brought on by: renetta.   Character:  swelling.    Progression of symptoms:  better  Previous similar pain: no .   Pain Level:  5/10.   Previous treatments:  ice and acetaminophen.  Currently on Blood thinners? No  Diagnosis of Diabetes? Yes

## 2018-09-17 NOTE — PROGRESS NOTES
Chief Complaint   Patient presents with     Musculoskeletal Problem     possible tear     CHIEF COMPLAINT:    The patient is a pleasant 72-year-old gentleman who was bowling the other night when he felt a sharp stabbing pop sensation in his mid biceps on the right. He has some concurrent bruising in the mid bicipital area as well. He has difficulty with raising his arm and a flexion pattern for him. He has no difficulty with flexing the elbow. When he does flex his biceps, the mass of the muscle has not migrated upward suggesting that he did not tear the insertion tendon.                         PAST, FAMILY,SOCIAL HISTORY:     Medical  History:   has a past medical history of Benign non-nodular prostatic hyperplasia, presence of lower urinary tract symptoms unspecified (5/31/2016); Colonic polyps; Family history of colon cancer (4/4/2016); Hyperlipidemia LDL goal <100 (5/3/2017); Hyperlipidemia LDL goal <130 (12/8/2016); LUCAS (obstructive sleep apnea) (4/4/2016); and Rash (4/4/2016).     Surgical History:   has a past surgical history that includes VASECTOMY UNILAT/BILAT W POSTOP SEMEN (1977); Colonoscopy w/wo [place] **Performed** (11/21/2005); COLONOSCOPY W BIOPSY (11/24/08); colonoscopy (11/29/2011); Arthroscopy knee with medial meniscectomy (6/6/2014); Colonoscopy (N/A, 11/12/2014); and Arthroscopy knee with medial meniscectomy (Right, 8/3/2018).     Social History:   reports that he has never smoked. He has never used smokeless tobacco. He reports that he drinks alcohol. He reports that he does not use illicit drugs.     Family History:  family history includes Cancer in his brother and mother; Cancer - colorectal in his father.            MEDICATIONS  Current Outpatient Prescriptions   Medication Sig Dispense Refill     Acetaminophen (TYLENOL PO) As needed       ADVIL 200 MG OR TABS Reported on 5/2/2017 120 0     aspirin 81 MG tablet Take by mouth daily 30 tablet      Cholecalciferol (VITAMIN D) 2000 UNITS  tablet Take 1 tablet by mouth daily.       clobetasol (TEMOVATE) 0.05 % cream Small amount to affected areas BID 60 g 1     desoximetasone (TOPICORT) 0.25 % cream use as directed to legs 100 g 1     ezetimibe (ZETIA) 10 MG tablet Take 1 tablet (10 mg) by mouth daily 90 tablet 3     finasteride (PROSCAR) 5 MG tablet Take 1 tablet (5 mg) by mouth daily 90 tablet 3     losartan-hydrochlorothiazide (HYZAAR) 100-25 MG per tablet Take 1 tablet by mouth daily 90 tablet 3     metFORMIN (GLUCOPHAGE) 500 MG tablet Take 1 tablet (500 mg) by mouth 2 times daily (with meals) 180 tablet 3     methocarbamol (ROBAXIN) 750 MG tablet Take 1 tablet (750 mg) by mouth every 6 hours as needed for muscle spasms (muscle spasm) 30 tablet 1     metoprolol succinate (TOPROL-XL) 100 MG 24 hr tablet Take 1 tablet (100 mg) by mouth daily 90 tablet 3     NIFEdipine ER (ADALAT CC) 60 MG 24 hr tablet Take 1 tablet (60 mg) by mouth daily 90 tablet 3     order for DME Equipment being ordered: CPAP and related hardware, mask and tubing as well heated humidity equipment. 1 Units 0     VITAMIN E 400 UNIT OR CAPS   0         --------------------------------------------------------------------------------------------------------------------                          REVIEW OF SYSTEMS:         LUNGS: Pt denies: cough,excess sputum, hemoptysis, or shortness of breath.   HEART: Pt denies: chest pain, arrythmia, syncope, tachy or bradyarrhythmia or excess edema.   GI: Pt denies: nausea, vomitting, diarrhea, constipation, melena, or hematochezia.   NEURO: Pt denies: seizures, strokes, diplopia, weakness, paraesthesias, or paralysis.                          EXAMINATION:         /70 (BP Location: Left arm, Patient Position: Chair, Cuff Size: Adult Large)  Pulse 72  Temp 97.8  F (36.6  C) (Temporal)  Resp 16  Wt 296 lb (134.3 kg)  SpO2 96%  BMI 45.01 kg/m2   Constitutional: The patient appears to be in no acute distress. The patient appears to be  adequately hydrated. No acute respiratory or hemodynamic distress is noted at this time.   LUNGS: clear bilaterally, airflow is brisk, no intercostal retraction or stridor is noted. No coughing is noted during visit.   HEART:  regular without rubs, clicks, gallops, or murmurs. PMI is nondisplaced. Upstrokes are brisk. S1,S2 are heard.   MS: Minimal crepitance is noted in the extremities. No deformity is present. Muscle strength is decreased in the right arm with flexion.. No acute joint erythema or swelling is present.                        DECISION MAKIN. Biceps muscle tear, right, initial encounter    - ORTHOPEDICS ADULT REFERRAL                             FOLLOW UP    I have asked the patient to make an appointment for follow up with me as indicated and for follow-up of his diabetes and medical problems            I have carefully explained the diagnosis and treatment options with the patient. The patient has displayed an understanding of the above, and all subsequent questions were answered.             DO ELIZABETH Tran    Portions of this note were produced using [employer]  Although every attempt at real-time proof reading has been made, occasional grammar/syntax errors may have been missed.

## 2018-09-17 NOTE — PATIENT INSTRUCTIONS
Encounter Diagnosis   Name Primary?     possible partial Rupture of right distal biceps tendon, initial encounter Yes     Rest, ice and elevate above heart level as needed for pain control  1. Xray: your shoulder xrays look good, no fracture, dislocation or tumor. We do see some arthritis at your ac joint and in the shoulder socket.   2. Exam: we can see that your distal bicep is torn, we think partially torn.  3. We can either do non operative treatment or operative treatment.  4. If we do not do surgery, if it is a partial tear, you often get back to full function with slight weakness possibly.  5. If we did surgery, if its a full tear, then it would be 6 weeks in a brace or splint, then 3 months before bowling and 5 months for full recovery.  You should be able to get tearful strength back after this.  6.  We do not feel that this will affect your golf but it is possible it could affect your bowling although you do more of pronation when you spend of onset of a supination so this is good.  7.  We decided to get an MRI to get further clarity of the extent of the tear of the biceps.   8. We have ordered an MRI for you.  Please call [phone] to schedule your MRI.  You will also need to schedule a follow up visit for the results from your MRI with Dr. Montoya.  You may call us at [phone] to schedule this appointment.  Please make this appointment for at least  2-3 days after your MRI.  [email or web address] and [email or web address] may offer reliable information regarding your diagnosis and treatment plan.    THANK YOU for coming in today. If you receive a survey via [employer] or mail please let us know if there was anything you especially appreciated today or if there is any way we can improve our clinic. We appreciate your input.    GENERAL INFORMATION:  Our hours are:  Monday :     Clinic 7:30 AM-430 PM ([facility])  Tuesday:      Operating Room All Day ([facility])  Wednesday: Clinic  7:30 AM - 11:15 AM ([facility])             Clinic 1:00 PM - 4:00PM ([facility])  Thursday:     Administrative Day  Friday:          Clinic 7:30 AM - 11:15 AM ([facility])            Clinic 1:00 PM - 4:00 PM ([facility])      [facility] for any issues or concerns: [phone]      We are not in the office Thursdays. Therefore non- urgent calls and medical messages received on Thursday will be addressed when we are back in the office on Wednesday. Urgent matters will be reviewed and addressed by one of our partners in the office as needed.    If lab work was done today as part of your evaluation you will generally be contacted via [employer], mail, or phone with the results within 1-5 days. If there is an alarming result we will contact you by phone. Lab results come back at varying times, I generally wait until all labs are resulted before making comments on results. Please note labs are automatically released to [employer] (if you have signed up for it) once available-at times you may see these prior to my having a chance to review them as well.    If you need refills please contact your pharmacist. They will send a refill request to me to review. Please allow 3 business days for us to process all refill requests. All narcotic refills should be handled in the clinic at the time of your visit.

## 2018-09-17 NOTE — PROGRESS NOTES
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  He enjoys bowling and golfing.  He bowls in a leage.    He is being seen for   Chief Complaints and History of Present Illnesses   Patient presents with     Consult     rt shoulder pain per Dr. Walters          History of Present Illness:   Ben Ceron is a 72 year old male who is seen in consultation at the request of Dr. Walters History of Present illness:  Ben presents for evaluation of:  1.) rt shoulder pain bicep pain, the pain is just proximal to his elbow.  Onset: last week, the date of injury is 9/11/2018.  Patient was bowling and threw the ball and heard a pop.  Symptoms brought on by: bowling.   Character:  swelling.   Patient has also noticed some bruising and also that his biceps looks different on the right side than it does on the left side.  He feels it is bunched up.  Patient denies any numbness or tingling.  Progression of symptoms:  better  Previous similar pain: no patient has never had any.  Problems with his right upper extremity such as fracture surgery or trauma.  Pain Level:  5/10.   Previous treatments:  ice and acetaminophen.  Patient has had no further treatments as of yet.  He has been cautious with it so he did not do more damage to it.  Currently on Blood thinners? No  Diagnosis of Diabetes? Yes     Additional History: Patient does bowl in a coffee league.  He also enjoys golfing.  He is concerned if he will have good function down the road.  He does not want to do further damage.    Patient's past medical, surgical, social and family histories reviewed.     Past Medical History:   Diagnosis Date     Benign non-nodular prostatic hyperplasia, presence of lower urinary tract symptoms unspecified 5/31/2016     Colonic polyps      Family history of colon cancer 4/4/2016    brother with colonoscopy      Hyperlipidemia LDL goal <100 5/3/2017      Hyperlipidemia LDL goal <130 12/8/2016     LUCAS (obstructive sleep apnea) 4/4/2016     Rash 4/4/2016         Past Surgical History:   Procedure Laterality Date     ARTHROSCOPY KNEE WITH MEDIAL MENISCECTOMY  6/6/2014    Procedure: ARTHROSCOPY KNEE WITH MEDIAL MENISCECTOMY;  Surgeon: Ramana Dominguez DO;  Location: [facility] OR     ARTHROSCOPY KNEE WITH MEDIAL MENISCECTOMY Right 8/3/2018    Procedure: ARTHROSCOPY KNEE WITH MEDIAL MENISCECTOMY;  Right knee arthroscopy with partial meniscectomy;  Surgeon: Ramana Dominguez DO;  Location: [facility] OR     COLONOSCOPY  11/29/2011    Polypectomy.     COLONOSCOPY N/A 11/12/2014    Procedure: COLONOSCOPY;  Surgeon: Brooks Burris MD;  Location: [facility] GI     HC COLONOSCOPY W BIOPSY  11/24/08     HC COLONOSCOPY W/WO BRUSH/WASH  11/21/2005    Polypectomy.     HC VASECTOMY UNILAT/BILAT W POSTOP SEMEN  1977    Vasectomy       Medications:    Current Outpatient Prescriptions on File Prior to Visit:  Acetaminophen (TYLENOL PO) As needed   ADVIL 200 MG OR TABS Reported on 5/2/2017   aspirin 81 MG tablet Take by mouth daily   Cholecalciferol (VITAMIN D) 2000 UNITS tablet Take 1 tablet by mouth daily.   clobetasol (TEMOVATE) 0.05 % cream Small amount to affected areas BID   desoximetasone (TOPICORT) 0.25 % cream use as directed to legs   ezetimibe (ZETIA) 10 MG tablet Take 1 tablet (10 mg) by mouth daily   finasteride (PROSCAR) 5 MG tablet Take 1 tablet (5 mg) by mouth daily   losartan-hydrochlorothiazide (HYZAAR) 100-25 MG per tablet Take 1 tablet by mouth daily   metFORMIN (GLUCOPHAGE) 500 MG tablet Take 1 tablet (500 mg) by mouth 2 times daily (with meals)   methocarbamol (ROBAXIN) 750 MG tablet Take 1 tablet (750 mg) by mouth every 6 hours as needed for muscle spasms (muscle spasm)   metoprolol succinate (TOPROL-XL) 100 MG 24 hr tablet Take 1 tablet (100 mg) by mouth daily   NIFEdipine ER (ADALAT CC) 60 MG 24 hr tablet Take 1 tablet (60 mg) by mouth daily   order for DME  "Equipment being ordered: CPAP and related hardware, mask and tubing as well heated humidity equipment.   VITAMIN E 400 UNIT OR CAPS    [DISCONTINUED] losartan-hydrochlorothiazide (HYZAAR) 100-25 MG per tablet Take 1 tablet by mouth daily   [DISCONTINUED] metoprolol succinate (TOPROL-XL) 100 MG 24 hr tablet Take 1 tablet (100 mg) by mouth daily   [DISCONTINUED] NIFEdipine ER (ADALAT CC) 60 MG 24 hr tablet Take 1 tablet (60 mg) by mouth daily     No current facility-administered medications on file prior to visit.     Allergies   Allergen Reactions     Norvasc [Amlodipine] Muscle Pain (Myalgia)     Simvastatin Rash     Rash and edema       Social History     Occupational History     [employer]     Social History Main Topics     Smoking status: Never Smoker     Smokeless tobacco: Never Used     Alcohol use 0.0 oz/week     0 Standard drinks or equivalent per week      Comment: occasional     Drug use: No     Sexual activity: Yes     Partners: Female       Family History   Problem Relation Age of Onset     Cancer Mother      Lung Cancer     Cancer - colorectal Father      Stomach Cancer     Cancer Brother      REVIEW OF SYSTEMS  10 point review systems performed otherwise negative as noted as per history of present illness.    Physical Exam:  Vitals: /70  Ht 1.727 m (5' 8\")  Wt 134.3 kg (296 lb)  BMI 45.01 kg/m2  BMI= Body mass index is 45.01 kg/(m^2).    Constitutional: healthy, alert and no acute distress   Psychiatric: mentation appears normal and affect normal/bright  NEURO: no focal deficits, CMS intact right upper extremity  RESP: Normal with easy respirations and no use of accessory muscles to breathe, no audible wheezing or retractions  CV: +2 radial pulse and his hand is warm to palpation.   SKIN: No erythema, rashes, excoriation, or breakdown. No evidence of infection.   MUSCULOSKELETAL:    INSPECTION of right elbow: We do notice ecchymosis just proximal to the elbow " around the mid arm anteriorly.  It does look like there is a Marciano deformity when compared to the contralateral side.  There is slight swelling also.  No other gross deformities, erythema, edema, ecchymosis, atrophy or fasciculations.     PALPATION: Slight tenderness to palpation over the distal biceps and just proximal to that.  There is no tenderness to palpation over the proximal biceps or shoulder area.  No increased warmth noted.  I do feel that I am able to palpate the distal biceps tendon.    ROM: Patient has full flexion and full extension of the elbow.  He is able to supinate and pronate 90  there is no catching or locking with the range of motion today.     STRENGTH: Negative 4 out of 5 supination strength which causes pain at the elbow today.  Patient has 5 out of 5 pronation strength without any pain.  Patient has 5 out of 5 triceps strength and 5 out of 5 biceps strength with the elbow flexed at approximately 120  but if the elbow is flexed less than 90  there is pain at the elbow with strength testing in the strength is only about negative 4 out of 5.    SPECIAL TEST: None  GAIT: non-antalgic  Lymph: no palpable lymph nodes    Diagnostic Modalities:  X-rays today 3 views of the right shoulder show no fracture no dislocation no tumor.  There is slight degenerative joint disease in the glenohumeral joint and AC joint.  Slight downsloping acromion.    Independent visualization of the images was performed.    Impression: 1.  6 days status post suspect partial right distal biceps tear    Plan:  All of the above pertinent physical exam and imaging modalities findings was reviewed with Ben.                                          CONSERVATIVE CARE:    Patient Instructions:  1. Xray: your shoulder xrays look good, no fracture, dislocation or tumor. We do see some arthritis at your ac joint and in the shoulder socket.   2. Exam: we can see that your distal bicep is torn, we think partially torn.  3. We can  either do non operative treatment or operative treatment.  4. If we do not do surgery, if it is a partial tear, you often get back to full function with slight weakness possibly.  5. If we did surgery, if its a full tear, then it would be 6 weeks in a brace or splint, then 3 months before bowling and 5 months for full recovery.  You should be able to get tearful strength back after this.  6.  We do not feel that this will affect your golf but it is possible it could affect your bowling although you do more of pronation when you spend of onset of a supination so this is good.  7.  We decided to get an MRI to get further clarity of the extent of the tear of the biceps.   8. We have ordered an MRI for you.  Please call [phone] to schedule your MRI.  You will also need to schedule a follow up visit for the results from your MRI with Dr. Montoya.  You may call us at [phone] to schedule this appointment.  Please make this appointment for at least  2-3 days after your MRI.  Re-x-ray on return: Yes on return we should get 3 views of the right elbow despite getting the MRI it is better to keep be thorough and get x-rays also.    BP Readings from Last 1 Encounters:   09/17/18 114/70       BP noted to be well controlled today in office.      Patient does not use Tobacco products.    Scribed by Brad Ivan PA-C on 9/17/2018 at 2:41 PM, based on Dr. Mahnaz Montoya's statements to me.    This note was dictated with [employer].    JARED Stacy MD

## 2018-09-17 NOTE — PROGRESS NOTES
HISTORY:    Ben Ceron is a pleasant 72-year-old male with a history of hypertension, morbid obesity, hyperlipidemia, obstructive sleep apnea, and type 2 diabetes.    Ben has struggled with hypertension in recent years.  He is careful and checks his own blood pressure at home.  Initially the cough that he was reading was inaccurate and giving high values but he is replaced that and his current cough correlates with those that we get in the office.  He reports that his blood pressures typically in the 130, very infrequently greater than 140, and diastolic pressures are typically in the 80s.  He is using his medications as directed, including Hyzaar, metoprolol, and nifedipine, and tolerating them without side effects.    Unfortunately Ben has not tolerated statins.  He was on Lipitor for many years but developed a rash and was subsequently put on simvastatin and Crestor both of which is the same rash.  He was put back on Lipitor and his rash recurred.  For several years now he has been using ezetimibe with adequate control of his LDL.  He does not have documented vascular disease.    ASSESSMENT/PLAN:    1.  Hypertension.  Blood pressure well controlled on current medications, no changes needed.  2.  Hyperlipidemia.  Using Zetia, intolerant of multiple statins with very uncomfortable rash and itch, continue Zetia.  We discussed the fact that this has not been proven to decrease vascular events, but there are no better alternatives.  He recently tried using generic ezetimibe and developed a rash shortly after making that switch.  3.  Type 2 diabetes.  Cares per primary care.    Thank you for inviting me to participate in your patient's care.  He has no active cardiology problems so I suggested that he have his primary care physician continue to refill his antihypertensive medications in the long-term.  Of course I would be happy to see him should further cardiology issues arise.      No orders of the defined  types were placed in this encounter.    Orders Placed This Encounter   Medications     losartan-hydrochlorothiazide (HYZAAR) 100-25 MG per tablet     Sig: Take 1 tablet by mouth daily     Dispense:  90 tablet     Refill:  3     metoprolol succinate (TOPROL-XL) 100 MG 24 hr tablet     Sig: Take 1 tablet (100 mg) by mouth daily     Dispense:  90 tablet     Refill:  3     NIFEdipine ER (ADALAT CC) 60 MG 24 hr tablet     Sig: Take 1 tablet (60 mg) by mouth daily     Dispense:  90 tablet     Refill:  3     Medications Discontinued During This Encounter   Medication Reason     losartan-hydrochlorothiazide (HYZAAR) 100-25 MG per tablet Reorder     metoprolol succinate (TOPROL-XL) 100 MG 24 hr tablet Reorder     NIFEdipine ER (ADALAT CC) 60 MG 24 hr tablet Reorder       10 year ASCVD risk: The 10-year ASCVD risk score (Emerita FREEMAN Jr, et al., 2013) is: 33.8%    Values used to calculate the score:      Age: 72 years      Sex: Male      Is Non- : No      Diabetic: Yes      Tobacco smoker: No      Systolic Blood Pressure: 134 mmHg      Is BP treated: Yes      HDL Cholesterol: 51 mg/dL      Total Cholesterol: 147 mg/dL    Encounter Diagnoses   Name Primary?     Essential hypertension with goal blood pressure less than 140/90      Benign essential hypertension        CURRENT MEDICATIONS:  Current Outpatient Prescriptions   Medication Sig Dispense Refill     Acetaminophen (TYLENOL PO) As needed       ADVIL 200 MG OR TABS Reported on 5/2/2017 120 0     aspirin 81 MG tablet Take by mouth daily 30 tablet      Cholecalciferol (VITAMIN D) 2000 UNITS tablet Take 1 tablet by mouth daily.       clobetasol (TEMOVATE) 0.05 % cream Small amount to affected areas BID 60 g 1     desoximetasone (TOPICORT) 0.25 % cream use as directed to legs 100 g 1     ezetimibe (ZETIA) 10 MG tablet Take 1 tablet (10 mg) by mouth daily 90 tablet 3     finasteride (PROSCAR) 5 MG tablet Take 1 tablet (5 mg) by mouth daily 90 tablet 3      losartan-hydrochlorothiazide (HYZAAR) 100-25 MG per tablet Take 1 tablet by mouth daily 90 tablet 3     metFORMIN (GLUCOPHAGE) 500 MG tablet Take 1 tablet (500 mg) by mouth 2 times daily (with meals) 180 tablet 3     methocarbamol (ROBAXIN) 750 MG tablet Take 1 tablet (750 mg) by mouth every 6 hours as needed for muscle spasms (muscle spasm) 30 tablet 1     metoprolol succinate (TOPROL-XL) 100 MG 24 hr tablet Take 1 tablet (100 mg) by mouth daily 90 tablet 3     NIFEdipine ER (ADALAT CC) 60 MG 24 hr tablet Take 1 tablet (60 mg) by mouth daily 90 tablet 3     order for DME Equipment being ordered: CPAP and related hardware, mask and tubing as well heated humidity equipment. 1 Units 0     VITAMIN E 400 UNIT OR CAPS   0     [DISCONTINUED] losartan-hydrochlorothiazide (HYZAAR) 100-25 MG per tablet Take 1 tablet by mouth daily 90 tablet 3     [DISCONTINUED] metoprolol succinate (TOPROL-XL) 100 MG 24 hr tablet Take 1 tablet (100 mg) by mouth daily 90 tablet 3     [DISCONTINUED] NIFEdipine ER (ADALAT CC) 60 MG 24 hr tablet Take 1 tablet (60 mg) by mouth daily 90 tablet 3       ALLERGIES     Allergies   Allergen Reactions     Norvasc [Amlodipine] Muscle Pain (Myalgia)     Simvastatin Rash     Rash and edema       PAST MEDICAL HISTORY:  Past Medical History:   Diagnosis Date     Benign non-nodular prostatic hyperplasia, presence of lower urinary tract symptoms unspecified 5/31/2016     Colonic polyps      Family history of colon cancer 4/4/2016    brother with colonoscopy      Hyperlipidemia LDL goal <100 5/3/2017     Hyperlipidemia LDL goal <130 12/8/2016     LUCAS (obstructive sleep apnea) 4/4/2016     Rash 4/4/2016       PAST SURGICAL HISTORY:  Past Surgical History:   Procedure Laterality Date     ARTHROSCOPY KNEE WITH MEDIAL MENISCECTOMY  6/6/2014    Procedure: ARTHROSCOPY KNEE WITH MEDIAL MENISCECTOMY;  Surgeon: Ramana Dominguez DO;  Location: [facility]     ARTHROSCOPY KNEE WITH MEDIAL MENISCECTOMY Right 8/3/2018     Procedure: ARTHROSCOPY KNEE WITH MEDIAL MENISCECTOMY;  Right knee arthroscopy with partial meniscectomy;  Surgeon: Raamna Dominguez DO;  Location: [facility] OR     COLONOSCOPY  11/29/2011    Polypectomy.     COLONOSCOPY N/A 11/12/2014    Procedure: COLONOSCOPY;  Surgeon: Brooks Burris MD;  Location: [facility] GI     HC COLONOSCOPY W BIOPSY  11/24/08     HC COLONOSCOPY W/WO BRUSH/WASH  11/21/2005    Polypectomy.     HC VASECTOMY UNILAT/BILAT W POSTOP SEMEN  1977    Vasectomy       FAMILY HISTORY:  Family History   Problem Relation Age of Onset     Cancer Mother      Lung Cancer     Cancer - colorectal Father      Stomach Cancer     Cancer Brother        SOCIAL HISTORY:  Social History     Social History     Marital status:      Spouse name: Bia     Number of children: 2     Years of education: 16     Occupational History     [employer]     Social History Main Topics     Smoking status: Never Smoker     Smokeless tobacco: Never Used     Alcohol use 0.0 oz/week     0 Standard drinks or equivalent per week      Comment: occasional     Drug use: No     Sexual activity: Yes     Partners: Female     Other Topics Concern      Service No     Blood Transfusions No     Caffeine Concern No     Occupational Exposure Yes     Radiation,     Hobby Hazards Yes     hunting     Sleep Concern Yes     CPAP     Stress Concern Yes     job     Weight Concern Yes     would like to lose      Special Diet No     Back Care No     Exercise No     Bike Helmet No     Seat Belt Yes     Self-Exams Yes     occassional     Parent/Sibling W/ Cabg, Mi Or Angioplasty Before 65f 55m? No     Social History Narrative       Review of Systems:  Skin:        Eyes:  Positive for glasses  ENT:  Negative    Respiratory:  Negative    Cardiovascular:  Negative for;palpitations;chest pain;lightheadedness;dizziness Positive for;edema  Gastroenterology: Negative    Genitourinary:  Negative    Musculoskeletal:  Positive  "for arthritis  Neurologic:  Negative    Psychiatric:  Negative    Heme/Lymph/Imm:  Positive for allergies  Endocrine:  Negative      Physical Exam:  Vitals: /72 (BP Location: Left arm, Patient Position: Fowlers, Cuff Size: Adult Large)  Pulse 74  Ht 1.727 m (5' 8\")  Wt 135.5 kg (298 lb 11.2 oz)  SpO2 96%  BMI 45.42 kg/m2    Constitutional:  cooperative, alert and oriented, well developed, well nourished, in no acute distress obese      Skin:  warm and dry to the touch        Head:  normocephalic        Eyes:  no xanthalasma        ENT:  no pallor or cyanosis        Neck:  carotid pulses are full and equal bilaterally, JVP normal, no carotid bruit        Chest:  normal breath sounds, clear to auscultation, normal A-P diameter, normal symmetry, normal respiratory excursion, no use of accessory muscles        Cardiac: regular rhythm, normal S1/S2, no S3 or S4, apical impulse not displaced, no murmurs, gallops or rubs   distant heart sounds              Abdomen:  abdomen soft, non-tender, BS normoactive, no mass, no HSM, no bruits        Vascular: pulses full and equal                                      Extremities and Back:  no edema        Neurological:  no gross motor deficits          Recent Lab Results:  LIPID RESULTS:  Lab Results   Component Value Date    CHOL 147 04/27/2018    HDL 51 04/27/2018    LDL 64 04/27/2018    TRIG 162 (H) 04/27/2018    CHOLHDLRATIO 5.0 11/03/2014       LIVER ENZYME RESULTS:  Lab Results   Component Value Date    AST 12 05/02/2017    ALT 28 05/02/2017       CBC RESULTS:  Lab Results   Component Value Date    WBC 12.5 (H) 08/01/2018    RBC 4.82 08/01/2018    HGB 15.3 08/01/2018    HCT 44.3 08/01/2018    MCV 92 08/01/2018    MCH 31.7 08/01/2018    MCHC 34.5 08/01/2018    RDW 12.2 08/01/2018     08/01/2018       BMP RESULTS:  Lab Results   Component Value Date     04/27/2018    POTASSIUM 3.8 04/27/2018    CHLORIDE 106 04/27/2018    CO2 28 04/27/2018    ANIONGAP 6 " 04/27/2018    GLC 93 04/27/2018    BUN 18 04/27/2018    CR 1.01 04/27/2018    GFRESTIMATED 73 04/27/2018    GFRESTBLACK 88 04/27/2018    ALLEN 8.6 04/27/2018        A1C RESULTS:  Lab Results   Component Value Date    A1C 5.8 04/27/2018       INR RESULTS:  No results found for: INR      Brad Mcginnis MD, [facility]    CC  No referring provider defined for this encounter.

## 2018-09-17 NOTE — MR AVS SNAPSHOT
After Visit Summary   9/17/2018    Ben Ceron    MRN: [id number]           Patient Information     Date Of Birth          1945        Visit Information        Provider Department      9/17/2018 2:00 PM Mahnaz Montoya MD [facility]        Today's Diagnoses     possible partial Rupture of right distal biceps tendon, initial encounter    -  1      Care Instructions    Encounter Diagnosis   Name Primary?     possible partial Rupture of right distal biceps tendon, initial encounter Yes     Rest, ice and elevate above heart level as needed for pain control  1. Xray: your shoulder xrays look good, no fracture, dislocation or tumor. We do see some arthritis at your ac joint and in the shoulder socket.   2. Exam: we can see that your distal bicep is torn, we think partially torn.  3. We can either do non operative treatment or operative treatment.  4. If we do not do surgery, if it is a partial tear, you often get back to full function with slight weakness possibly.  5. If we did surgery, if its a full tear, then it would be 6 weeks in a brace or splint, then 3 months before bowling and 5 months for full recovery.  You should be able to get tearful strength back after this.  6.  We do not feel that this will affect your golf but it is possible it could affect your bowling although you do more of pronation when you spend of onset of a supination so this is good.  7.  We decided to get an MRI to get further clarity of the extent of the tear of the biceps.   8. We have ordered an MRI for you.  Please call [phone] to schedule your MRI.  You will also need to schedule a follow up visit for the results from your MRI with Dr. Montoya.  You may call us at [phone] to schedule this appointment.  Please make this appointment for at least  2-3 days after your MRI.  [facility] and [employer] may offer reliable information regarding your diagnosis and treatment plan.    THANK YOU for  coming in today. If you receive a survey via [employer] or mail please let us know if there was anything you especially appreciated today or if there is any way we can improve our clinic. We appreciate your input.    GENERAL INFORMATION:  Our hours are:  Monday :     Clinic 7:30 AM-430 PM ([facility])  Tuesday:      Operating Room All Day ([facility])  Wednesday: Clinic 7:30 AM - 11:15 AM ([facility])             Clinic 1:00 PM - 4:00PM ([facility])  Thursday:     Administrative Day  Friday:          Clinic 7:30 AM - 11:15 AM ([facility])            Clinic 1:00 PM - 4:00 PM ([facility])      [facility] for any issues or concerns: [phone]      We are not in the office Thursdays. Therefore non- urgent calls and medical messages received on Thursday will be addressed when we are back in the office on Wednesday. Urgent matters will be reviewed and addressed by one of our partners in the office as needed.    If lab work was done today as part of your evaluation you will generally be contacted via Bub[employer]li, mail, or phone with the results within 1-5 days. If there is an alarming result we will contact you by phone. Lab results come back at varying times, I generally wait until all labs are resulted before making comments on results. Please note labs are automatically released to B[employer] (if you have signed up for it) once available-at times you may see these prior to my having a chance to review them as well.    If you need refills please contact your pharmacist. They will send a refill request to me to review. Please allow 3 business days for us to process all refill requests. All narcotic refills should be handled in the clinic at the time of your visit.           Follow-ups after your visit        Who to contact     If you have questions or need follow up information about today's clinic visit or  "your schedule please contact [facility] directly at [phone].  Normal or non-critical lab and imaging results will be communicated to you by MyChart, letter or phone within 4 business days after the clinic has received the results. If you do not hear from us within 7 days, please contact the clinic through MyChart or phone. If you have a critical or abnormal lab result, we will notify you by phone as soon as possible.  Submit refill requests through [employer] or call your pharmacy and they will forward the refill request to us. Please allow 3 business days for your refill to be completed.          Additional Information About Your Visit        Care EveryWhere ID     This is your Care EveryWhere ID. This could be used by other organizations to access your [facility] medical records  [id number]        Your Vitals Were     Height BMI (Body Mass Index)                1.727 m (5' 8\") 45.01 kg/m2           Blood Pressure from Last 3 Encounters:   09/17/18 114/70   09/17/18 134/72   09/17/18 114/70    Weight from Last 3 Encounters:   09/17/18 134.3 kg (296 lb)   09/17/18 135.5 kg (298 lb 11.2 oz)   09/17/18 134.3 kg (296 lb)                 Where to get your medicines      These medications were sent to [facility] [address] [place] [address] [address]  [address] [facility] [address]     Phone:  [phone]     losartan-hydrochlorothiazide 100-25 MG per tablet    metoprolol succinate 100 MG 24 hr tablet    NIFEdipine ER 60 MG 24 hr tablet          Primary Care Provider Office Phone # Fax #    Minayisel Salinas [place],  [phone]       [address] [facility] [address]        Equal Access to Services     [facility] AH: Hadii princess Elizabeth, waaxda luqadaha, qaybta kaalmada adeegyada, lbanche liz. So [facility] [phone].    ATENCIÓN: Si habla español, tiene a morales disposición servicios gratuitos de asistencia lingüística. Llame al " [phone].    We comply with applicable federal civil rights laws and [place] laws. We do not discriminate on the basis of race, color, national origin, age, disability, sex, sexual orientation, or gender identity.            Thank you!     Thank you for choosing [facility]  for your care. Our goal is always to provide you with excellent care. Hearing back from our patients is one way we can continue to improve our services. Please take a few minutes to complete the written survey that you may receive in the mail after your visit with us. Thank you!             Your Updated Medication List - Protect others around you: Learn how to safely use, store and throw away your medicines at www.disposemymeds.org.          This list is accurate as of 9/17/18  2:57 PM.  Always use your most recent med list.                   Brand Name Dispense Instructions for use Diagnosis    ADVIL 200 MG tablet   Generic drug:  ibuprofen     120    Reported on 5/2/2017        aspirin 81 MG tablet     30 tablet    Take by mouth daily        clobetasol 0.05 % cream    TEMOVATE    60 g    Small amount to affected areas BID    Yeast dermatitis       desoximetasone 0.25 % cream    TOPICORT    100 g    use as directed to legs    Rash       ezetimibe 10 MG tablet    ZETIA    90 tablet    Take 1 tablet (10 mg) by mouth daily    Hyperlipidemia LDL goal <130       finasteride 5 MG tablet    PROSCAR    90 tablet    Take 1 tablet (5 mg) by mouth daily    Benign prostatic hyperplasia with urinary frequency       losartan-hydrochlorothiazide 100-25 MG per tablet    HYZAAR    90 tablet    Take 1 tablet by mouth daily    Essential hypertension with goal blood pressure less than 140/90       metFORMIN 500 MG tablet    GLUCOPHAGE    180 tablet    Take 1 tablet (500 mg) by mouth 2 times daily (with meals)    Controlled type 2 diabetes mellitus without complication, without long-term current use of insulin (H)       methocarbamol 750 MG  tablet    ROBAXIN    30 tablet    Take 1 tablet (750 mg) by mouth every 6 hours as needed for muscle spasms (muscle spasm)    Complex tear of medial meniscus of right knee as current injury, initial encounter       metoprolol succinate 100 MG 24 hr tablet    TOPROL-XL    90 tablet    Take 1 tablet (100 mg) by mouth daily    Essential hypertension with goal blood pressure less than 140/90       NIFEdipine ER 60 MG 24 hr tablet    ADALAT CC    90 tablet    Take 1 tablet (60 mg) by mouth daily    Benign essential hypertension       order for DME     1 Units    Equipment being ordered: CPAP and related hardware, mask and tubing as well heated humidity equipment.    LUCAS (obstructive sleep apnea), Morbid obesity with BMI of 45.0-49.9, adult (H)       TYLENOL PO      As needed        vitamin D 2000 units tablet      Take 1 tablet by mouth daily.        vitamin E 400 UNIT capsule

## 2018-09-17 NOTE — MR AVS SNAPSHOT
After Visit Summary   9/17/2018    Ben Ceron    MRN: [id number]           Patient Information     Date Of Birth          1945        Visit Information        Provider Department      9/17/2018 8:20 AM Mina Walters DO [facility]        Today's Diagnoses     Biceps muscle tear, right, initial encounter    -  1       Follow-ups after your visit        Additional Services     ORTHOPEDICS ADULT REFERRAL       Your provider has referred you to: [facility]: [facility] [phone]   [email or web address]  Rc, please    Please be aware that coverage of these services is subject to the terms and limitations of your health insurance plan.  Call member services at your health plan with any benefit or coverage questions.      Please bring the following to your appointment:    >>   Any x-rays, CTs or MRIs which have been performed.  Contact the facility where they were done to arrange for  prior to your scheduled appointment.    >>   List of current medications   >>   This referral request   >>   Any documents/labs given to you for this referral                  Follow-up notes from your care team     Return if symptoms worsen or fail to improve.      Who to contact     If you have questions or need follow up information about today's clinic visit or your schedule please contact [facility] directly at [phone].  Normal or non-critical lab and imaging results will be communicated to you by MyChart, letter or phone within 4 business days after the clinic has received the results. If you do not hear from us within 7 days, please contact the clinic through MyChart or phone. If you have a critical or abnormal lab result, we will notify you by phone as soon as possible.  Submit refill requests through [employer] or call your pharmacy and they will forward the refill request to us. Please allow 3 business  days for your refill to be completed.          Additional Information About Your Visit        Care EveryWhere ID     This is your Care EveryWhere ID. This could be used by other organizations to access your [place] medical records  [id number]        Your Vitals Were     Pulse Temperature Respirations Pulse Oximetry BMI (Body Mass Index)       72 97.8  F (36.6  C) (Temporal) 16 96% 45.01 kg/m2        Blood Pressure from Last 3 Encounters:   09/21/18 128/68   09/17/18 114/70   09/17/18 134/72    Weight from Last 3 Encounters:   09/21/18 298 lb (135.2 kg)   09/17/18 296 lb (134.3 kg)   09/17/18 298 lb 11.2 oz (135.5 kg)              We Performed the Following     ORTHOPEDICS ADULT REFERRAL          Where to get your medicines      These medications were sent to [facility] [address] [place] [address] [address]  [address], [facility] [address]     Phone:  [phone]     losartan-hydrochlorothiazide 100-25 MG per tablet    metoprolol succinate 100 MG 24 hr tablet    NIFEdipine ER 60 MG 24 hr tablet          Primary Care Provider Office Phone # Fax #    Mina Brad Walters, [phone]       [address] [facility] [address]        Equal Access to Services     REMY SANTAMARIA : Hadii princess ku hadasho Soomaali, waaxda luqadaha, qaybta kaalmada adeegyada, waxay karishma liz. So [facility] [phone].    ATENCIÓN: Si habla español, tiene a morales disposición servicios gratuitos de asistencia lingüística. Llame al [phone].    We comply with applicable federal civil rights laws and [place] laws. We do not discriminate on the basis of race, color, national origin, age, disability, sex, sexual orientation, or gender identity.            Thank you!     Thank you for choosing [facility]  for your care. Our goal is always to provide you with excellent care. Hearing back from our patients is one way we can continue to improve our services. Please take a few minutes to  complete the written survey that you may receive in the mail after your visit with us. Thank you!             Your Updated Medication List - Protect others around you: Learn how to safely use, store and throw away your medicines at www.disposemymeds.org.          This list is accurate as of 9/17/18 11:59 PM.  Always use your most recent med list.                   Brand Name Dispense Instructions for use Diagnosis    ADVIL 200 MG tablet   Generic drug:  ibuprofen     120    Reported on 5/2/2017        aspirin 81 MG tablet     30 tablet    Take by mouth daily        clobetasol 0.05 % cream    TEMOVATE    60 g    Small amount to affected areas BID    Yeast dermatitis       desoximetasone 0.25 % cream    TOPICORT    100 g    use as directed to legs    Rash       ezetimibe 10 MG tablet    ZETIA    90 tablet    Take 1 tablet (10 mg) by mouth daily    Hyperlipidemia LDL goal <130       finasteride 5 MG tablet    PROSCAR    90 tablet    Take 1 tablet (5 mg) by mouth daily    Benign prostatic hyperplasia with urinary frequency       losartan-hydrochlorothiazide 100-25 MG per tablet    HYZAAR    90 tablet    Take 1 tablet by mouth daily    Essential hypertension with goal blood pressure less than 140/90       metFORMIN 500 MG tablet    GLUCOPHAGE    180 tablet    Take 1 tablet (500 mg) by mouth 2 times daily (with meals)    Controlled type 2 diabetes mellitus without complication, without long-term current use of insulin (H)       metoprolol succinate 100 MG 24 hr tablet    TOPROL-XL    90 tablet    Take 1 tablet (100 mg) by mouth daily    Essential hypertension with goal blood pressure less than 140/90       NIFEdipine ER 60 MG 24 hr tablet    ADALAT CC    90 tablet    Take 1 tablet (60 mg) by mouth daily    Benign essential hypertension       order for DME     1 Units    Equipment being ordered: CPAP and related hardware, mask and tubing as well heated humidity equipment.    LUCAS (obstructive sleep apnea), Morbid obesity  with BMI of 45.0-49.9, adult (H)       TYLENOL PO      As needed        vitamin D 2000 units tablet      Take 1 tablet by mouth daily.        vitamin E 400 UNIT capsule

## 2018-09-17 NOTE — LETTER
9/17/2018    Mina Walters, DO  [address] [facility] [address]    RE: Ben Ceron       Dear Colleague,    I had the pleasure of seeing Ben Ceron in the [facility].    HISTORY:    Ben Ceron is a pleasant 72-year-old male with a history of hypertension, morbid obesity, hyperlipidemia, obstructive sleep apnea, and type 2 diabetes.    Ben has struggled with hypertension in recent years.  He is careful and checks his own blood pressure at home.  Initially the cough that he was reading was inaccurate and giving high values but he is replaced that and his current cough correlates with those that we get in the office.  He reports that his blood pressures typically in the 130, very infrequently greater than 140, and diastolic pressures are typically in the 80s.  He is using his medications as directed, including Hyzaar, metoprolol, and nifedipine, and tolerating them without side effects.    Unfortunately Ben has not tolerated statins.  He was on Lipitor for many years but developed a rash and was subsequently put on simvastatin and Crestor both of which is the same rash.  He was put back on Lipitor and his rash recurred.  For several years now he has been using ezetimibe with adequate control of his LDL.  He does not have documented vascular disease.    ASSESSMENT/PLAN:    1.  Hypertension.  Blood pressure well controlled on current medications, no changes needed.  2.  Hyperlipidemia.  Using Zetia, intolerant of multiple statins with very uncomfortable rash and itch, continue Zetia.  We discussed the fact that this has not been proven to decrease vascular events, but there are no better alternatives.  He recently tried using generic ezetimibe and developed a rash shortly after making that switch.  3.  Type 2 diabetes.  Cares per primary care.    Thank you for inviting me to participate in your patient's care.  He has no active cardiology problems so I suggested that  he have his primary care physician continue to refill his antihypertensive medications in the long-term.  Of course I would be happy to see him should further cardiology issues arise.      No orders of the defined types were placed in this encounter.    Orders Placed This Encounter   Medications     losartan-hydrochlorothiazide (HYZAAR) 100-25 MG per tablet     Sig: Take 1 tablet by mouth daily     Dispense:  90 tablet     Refill:  3     metoprolol succinate (TOPROL-XL) 100 MG 24 hr tablet     Sig: Take 1 tablet (100 mg) by mouth daily     Dispense:  90 tablet     Refill:  3     NIFEdipine ER (ADALAT CC) 60 MG 24 hr tablet     Sig: Take 1 tablet (60 mg) by mouth daily     Dispense:  90 tablet     Refill:  3     Medications Discontinued During This Encounter   Medication Reason     losartan-hydrochlorothiazide (HYZAAR) 100-25 MG per tablet Reorder     metoprolol succinate (TOPROL-XL) 100 MG 24 hr tablet Reorder     NIFEdipine ER (ADALAT CC) 60 MG 24 hr tablet Reorder       10 year ASCVD risk: The 10-year ASCVD risk score ([place] FREEMAN Jr, et al., 2013) is: 33.8%    Values used to calculate the score:      Age: 72 years      Sex: Male      Is Non- : No      Diabetic: Yes      Tobacco smoker: No      Systolic Blood Pressure: 134 mmHg      Is BP treated: Yes      HDL Cholesterol: 51 mg/dL      Total Cholesterol: 147 mg/dL    Encounter Diagnoses   Name Primary?     Essential hypertension with goal blood pressure less than 140/90      Benign essential hypertension        CURRENT MEDICATIONS:  Current Outpatient Prescriptions   Medication Sig Dispense Refill     Acetaminophen (TYLENOL PO) As needed       ADVIL 200 MG OR TABS Reported on 5/2/2017 120 0     aspirin 81 MG tablet Take by mouth daily 30 tablet      Cholecalciferol (VITAMIN D) 2000 UNITS tablet Take 1 tablet by mouth daily.       clobetasol (TEMOVATE) 0.05 % cream Small amount to affected areas BID 60 g 1     desoximetasone (TOPICORT) 0.25 %  cream use as directed to legs 100 g 1     ezetimibe (ZETIA) 10 MG tablet Take 1 tablet (10 mg) by mouth daily 90 tablet 3     finasteride (PROSCAR) 5 MG tablet Take 1 tablet (5 mg) by mouth daily 90 tablet 3     losartan-hydrochlorothiazide (HYZAAR) 100-25 MG per tablet Take 1 tablet by mouth daily 90 tablet 3     metFORMIN (GLUCOPHAGE) 500 MG tablet Take 1 tablet (500 mg) by mouth 2 times daily (with meals) 180 tablet 3     methocarbamol (ROBAXIN) 750 MG tablet Take 1 tablet (750 mg) by mouth every 6 hours as needed for muscle spasms (muscle spasm) 30 tablet 1     metoprolol succinate (TOPROL-XL) 100 MG 24 hr tablet Take 1 tablet (100 mg) by mouth daily 90 tablet 3     NIFEdipine ER (ADALAT CC) 60 MG 24 hr tablet Take 1 tablet (60 mg) by mouth daily 90 tablet 3     order for DME Equipment being ordered: CPAP and related hardware, mask and tubing as well heated humidity equipment. 1 Units 0     VITAMIN E 400 UNIT OR CAPS   0     [DISCONTINUED] losartan-hydrochlorothiazide (HYZAAR) 100-25 MG per tablet Take 1 tablet by mouth daily 90 tablet 3     [DISCONTINUED] metoprolol succinate (TOPROL-XL) 100 MG 24 hr tablet Take 1 tablet (100 mg) by mouth daily 90 tablet 3     [DISCONTINUED] NIFEdipine ER (ADALAT CC) 60 MG 24 hr tablet Take 1 tablet (60 mg) by mouth daily 90 tablet 3       ALLERGIES     Allergies   Allergen Reactions     Norvasc [Amlodipine] Muscle Pain (Myalgia)     Simvastatin Rash     Rash and edema       PAST MEDICAL HISTORY:  Past Medical History:   Diagnosis Date     Benign non-nodular prostatic hyperplasia, presence of lower urinary tract symptoms unspecified 5/31/2016     Colonic polyps      Family history of colon cancer 4/4/2016    brother with colonoscopy      Hyperlipidemia LDL goal <100 5/3/2017     Hyperlipidemia LDL goal <130 12/8/2016     LUCAS (obstructive sleep apnea) 4/4/2016     Rash 4/4/2016       PAST SURGICAL HISTORY:  Past Surgical History:   Procedure Laterality Date     ARTHROSCOPY KNEE  WITH MEDIAL MENISCECTOMY  6/6/2014    Procedure: ARTHROSCOPY KNEE WITH MEDIAL MENISCECTOMY;  Surgeon: Ramana Dominguez DO;  Location: [facility] OR     ARTHROSCOPY KNEE WITH MEDIAL MENISCECTOMY Right 8/3/2018    Procedure: ARTHROSCOPY KNEE WITH MEDIAL MENISCECTOMY;  Right knee arthroscopy with partial meniscectomy;  Surgeon: Ramana Dominguez DO;  Location: [facility] OR     COLONOSCOPY  11/29/2011    Polypectomy.     COLONOSCOPY N/A 11/12/2014    Procedure: COLONOSCOPY;  Surgeon: Brooks Burris MD;  Location: [facility] GI     HC COLONOSCOPY W BIOPSY  11/24/08     HC COLONOSCOPY W/WO BRUSH/WASH  11/21/2005    Polypectomy.     HC VASECTOMY UNILAT/BILAT W POSTOP SEMEN  1977    Vasectomy       FAMILY HISTORY:  Family History   Problem Relation Age of Onset     Cancer Mother      Lung Cancer     Cancer - colorectal Father      Stomach Cancer     Cancer Brother        SOCIAL HISTORY:  Social History     Social History     Marital status:      Spouse name: Bia     Number of children: 2     Years of education: 16     Occupational History     [employer]adi[employer]     Social History Main Topics     Smoking status: Never Smoker     Smokeless tobacco: Never Used     Alcohol use 0.0 oz/week     0 Standard drinks or equivalent per week      Comment: occasional     Drug use: No     Sexual activity: Yes     Partners: Female     Other Topics Concern      Service No     Blood Transfusions No     Caffeine Concern No     Occupational Exposure Yes     Radiation,     Hobby Hazards Yes     hunting     Sleep Concern Yes     CPAP     Stress Concern Yes     job     Weight Concern Yes     would like to lose      Special Diet No     Back Care No     Exercise No     Bike Helmet No     Seat Belt Yes     Self-Exams Yes     occassional     Parent/Sibling W/ Cabg, Mi Or Angioplasty Before 65f 55m? No     Social History Narrative       Review of Systems:  Skin:        Eyes:  Positive for glasses  ENT:  Negative   "  Respiratory:  Negative    Cardiovascular:  Negative for;palpitations;chest pain;lightheadedness;dizziness Positive for;edema  Gastroenterology: Negative    Genitourinary:  Negative    Musculoskeletal:  Positive for arthritis  Neurologic:  Negative    Psychiatric:  Negative    Heme/Lymph/Imm:  Positive for allergies  Endocrine:  Negative      Physical Exam:  Vitals: /72 (BP Location: Left arm, Patient Position: Fowlers, Cuff Size: Adult Large)  Pulse 74  Ht 1.727 m (5' 8\")  Wt 135.5 kg (298 lb 11.2 oz)  SpO2 96%  BMI 45.42 kg/m2    Constitutional:  cooperative, alert and oriented, well developed, well nourished, in no acute distress obese      Skin:  warm and dry to the touch        Head:  normocephalic        Eyes:  no xanthalasma        ENT:  no pallor or cyanosis        Neck:  carotid pulses are full and equal bilaterally, JVP normal, no carotid bruit        Chest:  normal breath sounds, clear to auscultation, normal A-P diameter, normal symmetry, normal respiratory excursion, no use of accessory muscles        Cardiac: regular rhythm, normal S1/S2, no S3 or S4, apical impulse not displaced, no murmurs, gallops or rubs   distant heart sounds              Abdomen:  abdomen soft, non-tender, BS normoactive, no mass, no HSM, no bruits        Vascular: pulses full and equal                                      Extremities and Back:  no edema        Neurological:  no gross motor deficits          Recent Lab Results:  LIPID RESULTS:  Lab Results   Component Value Date    CHOL 147 04/27/2018    HDL 51 04/27/2018    LDL 64 04/27/2018    TRIG 162 (H) 04/27/2018    CHOLHDLRATIO 5.0 11/03/2014       LIVER ENZYME RESULTS:  Lab Results   Component Value Date    AST 12 05/02/2017    ALT 28 05/02/2017       CBC RESULTS:  Lab Results   Component Value Date    WBC 12.5 (H) 08/01/2018    RBC 4.82 08/01/2018    HGB 15.3 08/01/2018    HCT 44.3 08/01/2018    MCV 92 08/01/2018    MCH 31.7 08/01/2018    MCHC 34.5 08/01/2018 "    RDW 12.2 08/01/2018     08/01/2018       BMP RESULTS:  Lab Results   Component Value Date     04/27/2018    POTASSIUM 3.8 04/27/2018    CHLORIDE 106 04/27/2018    CO2 28 04/27/2018    ANIONGAP 6 04/27/2018    GLC 93 04/27/2018    BUN 18 04/27/2018    CR 1.01 04/27/2018    GFRESTIMATED 73 04/27/2018    GFRESTBLACK 88 04/27/2018    ALLEN 8.6 04/27/2018        A1C RESULTS:  Lab Results   Component Value Date    A1C 5.8 04/27/2018       INR RESULTS:  No results found for: INR      Thank you for allowing me to participate in the care of your patient.    Sincerely,     Brad Mcginnis MD     [facility]

## 2018-09-19 ENCOUNTER — HOSPITAL ENCOUNTER (OUTPATIENT)
Dept: MRI IMAGING | Facility: CLINIC | Age: 73
Discharge: HOME | End: 2018-09-19
Attending: ORTHOPAEDIC SURGERY | Admitting: ORTHOPAEDIC SURGERY
Payer: MEDICARE

## 2018-09-19 DIAGNOSIS — S46.211A RUPTURE OF RIGHT DISTAL BICEPS TENDON, INITIAL ENCOUNTER: ICD-10-CM

## 2018-09-19 PROCEDURE — 73221 MRI JOINT UPR EXTREM W/O DYE: CPT | Mod: RT

## 2018-09-21 ENCOUNTER — OFFICE VISIT (OUTPATIENT)
Dept: ORTHOPEDICS | Facility: CLINIC | Age: 73
End: 2018-09-21
Payer: COMMERCIAL

## 2018-09-21 VITALS
BODY MASS INDEX: 45.16 KG/M2 | TEMPERATURE: 97.7 F | WEIGHT: 298 LBS | DIASTOLIC BLOOD PRESSURE: 68 MMHG | HEIGHT: 68 IN | SYSTOLIC BLOOD PRESSURE: 128 MMHG

## 2018-09-21 DIAGNOSIS — S46.211D STRAIN OF RIGHT BICEPS MUSCLE, SUBSEQUENT ENCOUNTER: Primary | ICD-10-CM

## 2018-09-21 PROCEDURE — 99213 OFFICE O/P EST LOW 20 MIN: CPT | Mod: 24 | Performed by: ORTHOPAEDIC SURGERY

## 2018-09-21 NOTE — PATIENT INSTRUCTIONS
Treating Strains and Sprains  Strains and sprains happen when muscles or other soft tissues near your bones stretch or tear. These injuries can cause bruising, swelling, and pain. To ease your discomfort and speed the healing of your strain or sprain, follow the tips below. Remember, a strain or sprain can take 6 to 8 weeks to heal.     Important Note: Do not give aspirin to children or teens without discussing it with your healthcare provider first.        Ice first, heat later    Use ice for the first 24 to 48 hours after injury. Ice helps prevent swelling and reduce pain. Ice the injury for no more than 20 minutes at a time and allow at least 20 minutes between icing sessions.    Apply heat after the first 72 hours, once the swelling has gone down. Heat relaxes muscles and increases blood flow. Soak the injured area in warm water or use a heating pad set on low for no more than 15 minutes at a time.  Wrap and elevate    Wrap an injured limb firmly with an elastic bandage. This provides support and helps prevent swelling. Don t wear an elastic bandage overnight. Watch for tingling, numbness, or increased pain. Remove the bandage immediately if any of these occurs.    Elevate the injured area to help reduce swelling and throbbing. It s best to raise an injured limb above the level of your heart.     Medicines    Over-the-counter medicines such as acetaminophen or ibuprofen can help reduce pain. Some also help reduce swelling.    Take medicine only as directed.    Rest the area even if medicines are controlling the pain.  Rest    Rest the injured area by not using it for 24 hours.    When you re ready, return slowly to your normal activities. Rest the injured area often.    Don t use or walk on an injured limb if it hurts.  Date Last Reviewed: 1/1/2018 2000-2017 [employer]. [address], [place] [address]. All rights reserved. This information is not intended as a substitute for  professional medical care. Always follow your healthcare professional's instructions.

## 2018-09-21 NOTE — LETTER
"    9/21/2018         RE: Ben Ceron  [address] [facility] [address]        Dear Colleague,    Thank you for referring your patient, Ben Ceron, to the [facility]. Please see a copy of my visit note below.    Office Visit-Follow up      Chief Complaint: Ben Ceron is a 72 year old male who is being seen for   Chief Complaint   Patient presents with     Results     MRI 09/19/18         History of Present Illness:   Has bruising and soreness, concerned about bowling  Here for MRI results      Past medical history reviewed and there is no significant change    Patient does not use Tobacco products.    REVIEW OF SYSTEMS  General: negative for, night sweats, dizziness, fatigue  Resp: No shortness of breath and no cough  CV: negative for chest pain, syncope or near-syncope  GI: negative for nausea, vomiting and diarrhea  : negative for dysuria and hematuria  Musculoskeletal: as above  Neurologic: negative for syncope   Hematologic: negative for bleeding disorder      Physical Exam:  Vitals: /68  Temp 97.7  F (36.5  C) (Temporal)  Ht 1.727 m (5' 8\")  Wt 135.2 kg (298 lb)  BMI 45.31 kg/m2  BMI= Body mass index is 45.31 kg/(m^2).  Constitutional: healthy, alert and no acute distress   Psychiatric: mentation appears normal and affect normal/bright  NEURO: no focal deficits  RESP: Normal with easy respirations and no use of accessory muscles to breathe, no audible wheezing or retractions  CV: No peripheral edema  SKIN: No erythema, rashes, excoriation, or breakdown. No evidence of infection.   JOINT/EXTREMITIES:right arm - ecchymoses, swelling, and mild deformity of biceps muscle; distal biceps tendon intact  GAIT: non-antalgic      Diagnostic Modalities:  right elbow MRI:  Biceps muscle strain  Independent visualization of the images was performed.      Impression: right biceps muscle strain    Plan:  All of the above pertinent physical exam and imaging modalities findings was reviewed with " Ben.                                          CONSERVATIVE CARE:  I recommend conservative care for the patient to include NSAIDs, Tylenol, focused self directed physical therapy, activity modifications. Today I provided or dispensed info.                                                FUTURE PLAN:  On their return if they still have symptoms we will consider physical therapy.    BP Readings from Last 1 Encounters:   09/21/18 128/68       BP noted to be well controlled today in office.     Return to clinic PRN, or sooner as needed for changes.  Re-x-ray on return: No    Mahnaz Montoya M.D.          Again, thank you for allowing me to participate in the care of your patient.        Sincerely,        Mahnaz Montoya MD

## 2018-09-21 NOTE — PROGRESS NOTES
"Office Visit-Follow up      Chief Complaint: Ben Ceron is a 72 year old male who is being seen for   Chief Complaint   Patient presents with     Results     MRI 09/19/18         History of Present Illness:   Has bruising and soreness, concerned about bowling  Here for MRI results      Past medical history reviewed and there is no significant change    Patient does not use Tobacco products.    REVIEW OF SYSTEMS  General: negative for, night sweats, dizziness, fatigue  Resp: No shortness of breath and no cough  CV: negative for chest pain, syncope or near-syncope  GI: negative for nausea, vomiting and diarrhea  : negative for dysuria and hematuria  Musculoskeletal: as above  Neurologic: negative for syncope   Hematologic: negative for bleeding disorder      Physical Exam:  Vitals: /68  Temp 97.7  F (36.5  C) (Temporal)  Ht 1.727 m (5' 8\")  Wt 135.2 kg (298 lb)  BMI 45.31 kg/m2  BMI= Body mass index is 45.31 kg/(m^2).  Constitutional: healthy, alert and no acute distress   Psychiatric: mentation appears normal and affect normal/bright  NEURO: no focal deficits  RESP: Normal with easy respirations and no use of accessory muscles to breathe, no audible wheezing or retractions  CV: No peripheral edema  SKIN: No erythema, rashes, excoriation, or breakdown. No evidence of infection.   JOINT/EXTREMITIES:right arm - ecchymoses, swelling, and mild deformity of biceps muscle; distal biceps tendon intact  GAIT: non-antalgic      Diagnostic Modalities:  right elbow MRI:  Biceps muscle strain  Independent visualization of the images was performed.      Impression: right biceps muscle strain    Plan:  All of the above pertinent physical exam and imaging modalities findings was reviewed with Ben.                                          CONSERVATIVE CARE:  I recommend conservative care for the patient to include NSAIDs, Tylenol, focused self directed physical therapy, activity modifications. Today I provided or " dispensed info.                                                FUTURE PLAN:  On their return if they still have symptoms we will consider physical therapy.    BP Readings from Last 1 Encounters:   09/21/18 128/68       BP noted to be well controlled today in office.     Return to clinic PRN, or sooner as needed for changes.  Re-x-ray on return: No    Mahnaz Montoya M.D.

## 2018-09-21 NOTE — MR AVS SNAPSHOT
After Visit Summary   9/21/2018    Ben Ceron    MRN: [id number]           Patient Information     Date Of Birth          1945        Visit Information        Provider Department      9/21/2018 9:00 AM Mahnaz Montoya MD [facility]        Care Instructions      Treating Strains and Sprains  Strains and sprains happen when muscles or other soft tissues near your bones stretch or tear. These injuries can cause bruising, swelling, and pain. To ease your discomfort and speed the healing of your strain or sprain, follow the tips below. Remember, a strain or sprain can take 6 to 8 weeks to heal.     Important Note: Do not give aspirin to children or teens without discussing it with your healthcare provider first.        Ice first, heat later    Use ice for the first 24 to 48 hours after injury. Ice helps prevent swelling and reduce pain. Ice the injury for no more than 20 minutes at a time and allow at least 20 minutes between icing sessions.    Apply heat after the first 72 hours, once the swelling has gone down. Heat relaxes muscles and increases blood flow. Soak the injured area in warm water or use a heating pad set on low for no more than 15 minutes at a time.  Wrap and elevate    Wrap an injured limb firmly with an elastic bandage. This provides support and helps prevent swelling. Don t wear an elastic bandage overnight. Watch for tingling, numbness, or increased pain. Remove the bandage immediately if any of these occurs.    Elevate the injured area to help reduce swelling and throbbing. It s best to raise an injured limb above the level of your heart.     Medicines    Over-the-counter medicines such as acetaminophen or ibuprofen can help reduce pain. Some also help reduce swelling.    Take medicine only as directed.    Rest the area even if medicines are controlling the pain.  Rest    Rest the injured area by not using it for 24 hours.    When you re ready, return slowly  "to your normal activities. Rest the injured area often.    Don t use or walk on an injured limb if it hurts.  Date Last Reviewed: 1/1/2018 2000-2017 [employer]. [address] [place] [address], [place] [address]. All rights reserved. This information is not intended as a substitute for professional medical care. Always follow your healthcare professional's instructions.                Follow-ups after your visit        Who to contact     If you have questions or need follow up information about today's clinic visit or your schedule please contact [facility] directly at [phone].  Normal or non-critical lab and imaging results will be communicated to you by MyChart, letter or phone within 4 business days after the clinic has received the results. If you do not hear from us within 7 days, please contact the clinic through MyChart or phone. If you have a critical or abnormal lab result, we will notify you by phone as soon as possible.  Submit refill requests through [facility] or call your pharmacy and they will forward the refill request to us. Please allow 3 business days for your refill to be completed.          Additional Information About Your Visit        Care EveryWhere ID     This is your Care EveryWhere ID. This could be used by other organizations to access your [place] medical records  [id number]        Your Vitals Were     Temperature Height BMI (Body Mass Index)             97.7  F (36.5  C) (Temporal) 1.727 m (5' 8\") 45.31 kg/m2          Blood Pressure from Last 3 Encounters:   09/21/18 128/68   09/17/18 114/70   09/17/18 134/72    Weight from Last 3 Encounters:   09/21/18 135.2 kg (298 lb)   09/17/18 134.3 kg (296 lb)   09/17/18 135.5 kg (298 lb 11.2 oz)              Today, you had the following     No orders found for display       Primary Care Provider Office Phone # Fax #    Mina Walters [phone]       [address] [facility] [address]   "      Equal Access to Services     [facility] : Hadii aad ku hadkevanloraine Marielaling, waalfredoda luqadaha, qaybta kaalmanova george, blanche liz. So [facility] [phone].    ATENCIÓN: Si habla español, tiene a morales disposición servicios gratuitos de asistencia lingüística. Wildame al [phone].    We comply with applicable federal civil rights laws and [place] laws. We do not discriminate on the basis of race, color, national origin, age, disability, sex, sexual orientation, or gender identity.            Thank you!     Thank you for choosing [facility]  for your care. Our goal is always to provide you with excellent care. Hearing back from our patients is one way we can continue to improve our services. Please take a few minutes to complete the written survey that you may receive in the mail after your visit with us. Thank you!             Your Updated Medication List - Protect others around you: Learn how to safely use, store and throw away your medicines at www.disposemymeds.org.          This list is accurate as of 9/21/18  9:27 AM.  Always use your most recent med list.                   Brand Name Dispense Instructions for use Diagnosis    ADVIL 200 MG tablet   Generic drug:  ibuprofen     120    Reported on 5/2/2017        aspirin 81 MG tablet     30 tablet    Take by mouth daily        clobetasol 0.05 % cream    TEMOVATE    60 g    Small amount to affected areas BID    Yeast dermatitis       desoximetasone 0.25 % cream    TOPICORT    100 g    use as directed to legs    Rash       ezetimibe 10 MG tablet    ZETIA    90 tablet    Take 1 tablet (10 mg) by mouth daily    Hyperlipidemia LDL goal <130       finasteride 5 MG tablet    PROSCAR    90 tablet    Take 1 tablet (5 mg) by mouth daily    Benign prostatic hyperplasia with urinary frequency       losartan-hydrochlorothiazide 100-25 MG per tablet    HYZAAR    90 tablet    Take 1 tablet by mouth daily    Essential hypertension  with goal blood pressure less than 140/90       metFORMIN 500 MG tablet    GLUCOPHAGE    180 tablet    Take 1 tablet (500 mg) by mouth 2 times daily (with meals)    Controlled type 2 diabetes mellitus without complication, without long-term current use of insulin (H)       metoprolol succinate 100 MG 24 hr tablet    TOPROL-XL    90 tablet    Take 1 tablet (100 mg) by mouth daily    Essential hypertension with goal blood pressure less than 140/90       NIFEdipine ER 60 MG 24 hr tablet    ADALAT CC    90 tablet    Take 1 tablet (60 mg) by mouth daily    Benign essential hypertension       order for DME     1 Units    Equipment being ordered: CPAP and related hardware, mask and tubing as well heated humidity equipment.    LUCAS (obstructive sleep apnea), Morbid obesity with BMI of 45.0-49.9, adult (H)       TYLENOL PO      As needed        vitamin D 2000 units tablet      Take 1 tablet by mouth daily.        vitamin E 400 UNIT capsule

## 2019-02-01 DIAGNOSIS — I10 ESSENTIAL HYPERTENSION WITH GOAL BLOOD PRESSURE LESS THAN 140/90: Primary | ICD-10-CM

## 2019-02-01 NOTE — TELEPHONE ENCOUNTER
Patient pharmacy is requesting new RX sent the combo of losartan/hctz is not available at this time so patient will need to take them individual.       Alexandrea Cervantes MA

## 2019-02-04 RX ORDER — HYDROCHLOROTHIAZIDE 25 MG/1
25 TABLET ORAL DAILY
Qty: 90 TABLET | Refills: 3 | Status: SHIPPED | OUTPATIENT
Start: 2019-02-04 | End: 2019-05-08

## 2019-02-04 RX ORDER — LOSARTAN POTASSIUM 100 MG/1
100 TABLET ORAL DAILY
Qty: 90 TABLET | Refills: 3 | Status: SHIPPED | OUTPATIENT
Start: 2019-02-04 | End: 2019-05-08

## 2019-02-04 NOTE — TELEPHONE ENCOUNTER
"Requested Prescriptions   Pending Prescriptions Disp Refills     losartan (COZAAR) 100 MG tablet 90 tablet 3    Last Written Prescription Date:  NA  Last Fill Quantity: NA,  # refills: NA   Last office visit: 8/1/18  Future Office Visit:     Sig: Take 1 tablet (100 mg) by mouth daily    Angiotensin-II Receptors Failed - 2/1/2019  2:56 PM       Failed - Medication is active on med list       Passed - Blood pressure under 140/90 in past 12 months    BP Readings from Last 3 Encounters:   09/21/18 128/68   09/17/18 114/70   09/17/18 134/72          Passed - Recent (12 mo) or future (30 days) visit within the authorizing provider's specialty    Patient had office visit in the last 12 months or has a visit in the next 30 days with authorizing provider or within the authorizing provider's specialty.  See \"Patient Info\" tab in inbasket, or \"Choose Columns\" in Meds & Orders section of the refill encounter.           Passed - Patient is age 18 or older       Passed - Normal serum creatinine on file in past 12 months    Recent Labs   Lab Test 04/27/18  1335   CR 1.01          Passed - Normal serum potassium on file in past 12 months    Recent Labs   Lab Test 04/27/18  1335   POTASSIUM 3.8       Routing refill request to provider for review/approval because:  Drug not active on patient's medication list            hydrochlorothiazide (HYDRODIURIL) 25 MG tablet 90 tablet 3    Last Written Prescription Date:  NA  Last Fill Quantity: NA,  # refills: na   Last office visit: 8/1/18  Future Office Visit:     Sig: Take 1 tablet (25 mg) by mouth daily    Diuretics (Including Combos) Protocol Failed - 2/1/2019  2:56 PM       Failed - Medication is active on med list       Passed - Blood pressure under 140/90 in past 12 months    BP Readings from Last 3 Encounters:   09/21/18 128/68   09/17/18 114/70   09/17/18 134/72          Passed - Recent (12 mo) or future (30 days) visit within the authorizing provider's specialty    Patient had " "office visit in the last 12 months or has a visit in the next 30 days with authorizing provider or within the authorizing provider's specialty.  See \"Patient Info\" tab in inbasket, or \"Choose Columns\" in Meds & Orders section of the refill encounter.           Passed - Patient is age 18 or older       Passed - Normal serum creatinine on file in past 12 months    Recent Labs   Lab Test 04/27/18  1335   CR 1.01           Passed - Normal serum potassium on file in past 12 months    Recent Labs   Lab Test 04/27/18  1335   POTASSIUM 3.8           Passed - Normal serum sodium on file in past 12 months    Recent Labs   Lab Test 04/27/18  1335             Routing refill request to provider for review/approval because:  Drug not active on patient's medication list    Juanita Parish RN            "

## 2019-03-14 DIAGNOSIS — I10 ESSENTIAL HYPERTENSION WITH GOAL BLOOD PRESSURE LESS THAN 140/90: ICD-10-CM

## 2019-03-14 NOTE — TELEPHONE ENCOUNTER
Reason for Call:  Medication or medication refill:    Do you use a [facility]?  Name of the pharmacy and phone number for the current request:  [facility] in [place], [place]    Name of the medication requested: metoprolol succinate (TOPROL-XL) 100 MG 24 hr tablet    Other request: Please send a new Rx for this medication to [facility] in [place] at [phone]. Patient is completely out and needs this as soon as possible.     Can we leave a detailed message on this number? YES    Phone number patient can be reached at: Home number on file [phone] (home)    Best Time:     Call taken on 3/14/2019 at 3:56 PM by Martha Sales

## 2019-03-15 RX ORDER — METOPROLOL SUCCINATE 100 MG/1
100 TABLET, EXTENDED RELEASE ORAL DAILY
Qty: 90 TABLET | Refills: 3 | Status: SHIPPED | OUTPATIENT
Start: 2019-03-15 | End: 2019-05-08

## 2019-04-22 DIAGNOSIS — N40.1 BENIGN PROSTATIC HYPERPLASIA WITH URINARY FREQUENCY: ICD-10-CM

## 2019-04-22 DIAGNOSIS — E11.9 CONTROLLED TYPE 2 DIABETES MELLITUS WITHOUT COMPLICATION, WITHOUT LONG-TERM CURRENT USE OF INSULIN (H): ICD-10-CM

## 2019-04-22 DIAGNOSIS — R35.0 BENIGN PROSTATIC HYPERPLASIA WITH URINARY FREQUENCY: ICD-10-CM

## 2019-04-23 RX ORDER — FINASTERIDE 5 MG/1
TABLET, FILM COATED ORAL
Qty: 30 TABLET | Refills: 0 | Status: SHIPPED | OUTPATIENT
Start: 2019-04-23 | End: 2019-05-08

## 2019-04-23 NOTE — TELEPHONE ENCOUNTER
"Requested Prescriptions   Pending Prescriptions Disp Refills     metFORMIN (GLUCOPHAGE) 500 MG tablet [Pharmacy Med Name: METFORMIN 500MG TAB] 180 tablet 3     Sig: TAKE 1 TABLET BY MOUTH TWICE DAILY WITH MEALS   Last Written Prescription Date:  4/27/18  Last Fill Quantity: 180,  # refills: 3   Last office visit: 9/17/2018 with prescribing provider:  9/17/18   Future Office Visit:        Biguanide Agents Failed - 4/22/2019 11:53 AM        Failed - Blood pressure less than 140/90 in past 6 months     BP Readings from Last 3 Encounters:   09/21/18 128/68   09/17/18 114/70   09/17/18 134/72                 Failed - Patient has documented A1c within the specified period of time.     If HgbA1C is 8 or greater, it needs to be on file within the past 3 months.  If less than 8, must be on file within the past 6 months.     Recent Labs   Lab Test 04/27/18  1335   A1C 5.8             Failed - Recent (6 mo) or future (30 days) visit within the authorizing provider's specialty     Patient had office visit in the last 6 months or has a visit in the next 30 days with authorizing provider or within the authorizing provider's specialty.  See \"Patient Info\" tab in inbasket, or \"Choose Columns\" in Meds & Orders section of the refill encounter.            Passed - Patient has documented LDL within the past 12 mos.     Recent Labs   Lab Test 04/27/18  1335   LDL 64             Passed - Patient has had a Microalbumin in the past 15 mos.     Recent Labs   Lab Test 04/27/18  1340   MICROL 17   UMALCR 6.59             Passed - Patient is age 10 or older        Passed - Patient's CR is NOT>1.4 OR Patient's EGFR is NOT<45 within past 12 mos.     Recent Labs   Lab Test 04/27/18  1335   GFRESTIMATED 73   GFRESTBLACK 88       Recent Labs   Lab Test 04/27/18  1335   CR 1.01             Passed - Patient does NOT have a diagnosis of CHF.        Passed - Medication is active on med list        finasteride (PROSCAR) 5 MG tablet [Pharmacy Med Name: " "FINASTERIDE 5MG     TAB] 90 tablet 3     Sig: TAKE 1 TABLET BY MOUTH ONCE DAILY   Last Written Prescription Date:  4/27/18  Last Fill Quantity: 90,  # refills: 3   Last office visit: 9/17/2018 with prescribing provider:  9/17/18   Future Office Visit:        Alpha Blockers Passed - 4/22/2019 11:53 AM        Passed - Blood pressure under 140/90 in past 12 months     BP Readings from Last 3 Encounters:   09/21/18 128/68   09/17/18 114/70   09/17/18 134/72                 Passed - Recent (12 mo) or future (30 days) visit within the authorizing provider's specialty     Patient had office visit in the last 12 months or has a visit in the next 30 days with authorizing provider or within the authorizing provider's specialty.  See \"Patient Info\" tab in inbasket, or \"Choose Columns\" in Meds & Orders section of the refill encounter.              Passed - Patient does not have Tadalafil, Vardenafil, or Sildenafil on their medication list        Passed - Medication is active on med list        Passed - Patient is 18 years of age or older        "

## 2019-04-23 NOTE — TELEPHONE ENCOUNTER
Left message for patient to call back and speak with any .        Rosi Ardon ~ Patient Representative  [address] [address]  [email or web address]  [email or web address]  Office:  [phone]  Fax:  [phone]

## 2019-04-23 NOTE — TELEPHONE ENCOUNTER
Medication is being filled for 1 time refill only due to:  Patient needs to be seen because overdue for 6 month diabetic check.     Will route to scheduling to call and schedule.     CORI SinhaN, RN  [facility] [place]

## 2019-05-08 ENCOUNTER — HOSPITAL ENCOUNTER (OUTPATIENT)
Facility: CLINIC | Age: 74
End: 2019-05-08
Attending: SURGERY | Admitting: SURGERY
Payer: MEDICARE

## 2019-05-08 ENCOUNTER — TELEPHONE (OUTPATIENT)
Dept: FAMILY MEDICINE | Facility: OTHER | Age: 74
End: 2019-05-08

## 2019-05-08 ENCOUNTER — OFFICE VISIT (OUTPATIENT)
Dept: FAMILY MEDICINE | Facility: OTHER | Age: 74
End: 2019-05-08
Payer: COMMERCIAL

## 2019-05-08 VITALS
BODY MASS INDEX: 45.28 KG/M2 | HEART RATE: 76 BPM | DIASTOLIC BLOOD PRESSURE: 82 MMHG | TEMPERATURE: 97.2 F | WEIGHT: 298.8 LBS | OXYGEN SATURATION: 97 % | SYSTOLIC BLOOD PRESSURE: 138 MMHG | HEIGHT: 68 IN | RESPIRATION RATE: 22 BRPM

## 2019-05-08 DIAGNOSIS — Z13.89 SCREENING FOR DIABETIC PERIPHERAL NEUROPATHY: ICD-10-CM

## 2019-05-08 DIAGNOSIS — R35.0 BENIGN PROSTATIC HYPERPLASIA WITH URINARY FREQUENCY: ICD-10-CM

## 2019-05-08 DIAGNOSIS — I10 ESSENTIAL HYPERTENSION WITH GOAL BLOOD PRESSURE LESS THAN 140/90: ICD-10-CM

## 2019-05-08 DIAGNOSIS — Z11.59 NEED FOR HEPATITIS C SCREENING TEST: ICD-10-CM

## 2019-05-08 DIAGNOSIS — Z12.11 SPECIAL SCREENING FOR MALIGNANT NEOPLASMS, COLON: Primary | ICD-10-CM

## 2019-05-08 DIAGNOSIS — I10 BENIGN ESSENTIAL HYPERTENSION: ICD-10-CM

## 2019-05-08 DIAGNOSIS — E11.9 CONTROLLED TYPE 2 DIABETES MELLITUS WITHOUT COMPLICATION, WITHOUT LONG-TERM CURRENT USE OF INSULIN (H): ICD-10-CM

## 2019-05-08 DIAGNOSIS — E66.01 MORBID OBESITY WITH BMI OF 45.0-49.9, ADULT (H): ICD-10-CM

## 2019-05-08 DIAGNOSIS — N40.1 BENIGN PROSTATIC HYPERPLASIA WITH URINARY FREQUENCY: ICD-10-CM

## 2019-05-08 DIAGNOSIS — E78.5 HYPERLIPIDEMIA LDL GOAL <130: ICD-10-CM

## 2019-05-08 LAB
ANION GAP SERPL CALCULATED.3IONS-SCNC: 9 MMOL/L (ref 3–14)
BUN SERPL-MCNC: 24 MG/DL (ref 7–30)
CALCIUM SERPL-MCNC: 9 MG/DL (ref 8.5–10.1)
CHLORIDE SERPL-SCNC: 106 MMOL/L (ref 94–109)
CHOLEST SERPL-MCNC: 175 MG/DL
CO2 SERPL-SCNC: 27 MMOL/L (ref 20–32)
CREAT SERPL-MCNC: 0.83 MG/DL (ref 0.66–1.25)
CREAT UR-MCNC: 149 MG/DL
GFR SERPL CREATININE-BSD FRML MDRD: 88 ML/MIN/{1.73_M2}
GLUCOSE SERPL-MCNC: 112 MG/DL (ref 70–99)
HBA1C MFR BLD: 5.8 % (ref 0–5.6)
HCV AB SERPL QL IA: NONREACTIVE
HDLC SERPL-MCNC: 48 MG/DL
LDLC SERPL CALC-MCNC: 101 MG/DL
MICROALBUMIN UR-MCNC: 13 MG/L
MICROALBUMIN/CREAT UR: 8.46 MG/G CR (ref 0–17)
NONHDLC SERPL-MCNC: 127 MG/DL
POTASSIUM SERPL-SCNC: 4.2 MMOL/L (ref 3.4–5.3)
SODIUM SERPL-SCNC: 142 MMOL/L (ref 133–144)
TRIGL SERPL-MCNC: 128 MG/DL
TSH SERPL DL<=0.005 MIU/L-ACNC: 1.15 MU/L (ref 0.4–4)

## 2019-05-08 PROCEDURE — 99207 C FOOT EXAM  NO CHARGE: CPT | Performed by: INTERNAL MEDICINE

## 2019-05-08 PROCEDURE — 36415 COLL VENOUS BLD VENIPUNCTURE: CPT | Performed by: INTERNAL MEDICINE

## 2019-05-08 PROCEDURE — 80061 LIPID PANEL: CPT | Performed by: INTERNAL MEDICINE

## 2019-05-08 PROCEDURE — 84443 ASSAY THYROID STIM HORMONE: CPT | Performed by: INTERNAL MEDICINE

## 2019-05-08 PROCEDURE — 80048 BASIC METABOLIC PNL TOTAL CA: CPT | Performed by: INTERNAL MEDICINE

## 2019-05-08 PROCEDURE — 86803 HEPATITIS C AB TEST: CPT | Performed by: INTERNAL MEDICINE

## 2019-05-08 PROCEDURE — 83036 HEMOGLOBIN GLYCOSYLATED A1C: CPT | Performed by: INTERNAL MEDICINE

## 2019-05-08 PROCEDURE — 82043 UR ALBUMIN QUANTITATIVE: CPT | Performed by: INTERNAL MEDICINE

## 2019-05-08 PROCEDURE — G0439 PPPS, SUBSEQ VISIT: HCPCS | Performed by: INTERNAL MEDICINE

## 2019-05-08 RX ORDER — FINASTERIDE 5 MG/1
1 TABLET, FILM COATED ORAL DAILY
Qty: 90 TABLET | Refills: 3 | Status: SHIPPED | OUTPATIENT
Start: 2019-05-08 | End: 2020-05-11

## 2019-05-08 RX ORDER — LOSARTAN POTASSIUM AND HYDROCHLOROTHIAZIDE 25; 100 MG/1; MG/1
1 TABLET ORAL DAILY
Qty: 90 TABLET | Refills: 3 | Status: SHIPPED | OUTPATIENT
Start: 2019-05-08 | End: 2020-05-14

## 2019-05-08 RX ORDER — EZETIMIBE 10 MG/1
10 TABLET ORAL DAILY
Qty: 90 TABLET | Refills: 3 | Status: SHIPPED | OUTPATIENT
Start: 2019-05-08 | End: 2021-08-06

## 2019-05-08 RX ORDER — METOPROLOL SUCCINATE 100 MG/1
100 TABLET, EXTENDED RELEASE ORAL DAILY
Qty: 90 TABLET | Refills: 3 | Status: SHIPPED | OUTPATIENT
Start: 2019-05-08 | End: 2020-05-11

## 2019-05-08 ASSESSMENT — PAIN SCALES - GENERAL: PAINLEVEL: NO PAIN (0)

## 2019-05-08 ASSESSMENT — MIFFLIN-ST. JEOR: SCORE: 2084.85

## 2019-05-08 ASSESSMENT — ACTIVITIES OF DAILY LIVING (ADL): CURRENT_FUNCTION: NO ASSISTANCE NEEDED

## 2019-05-08 NOTE — LETTER
May 21, 2019      Ben Ceron  [address] [facility] [address]        Dear ,    We are writing to inform you of your test results.    The microalbumin is normal.   The hepatitis C screening test is negative.   The thyroid is well adjusted.   The cholesterol is well controlled with an LDL of 101.   The chemistry panel shows a mildly elevated blood sugar 112 and normal kidney function.   In the hemoglobin A1c is excellent at 5.8 suggesting excellent blood sugar control.     Resulted Orders   Hepatitis C Screen Reflex to HCV RNA Quant and Genotype   Result Value Ref Range    Hepatitis C Antibody Nonreactive NR^Nonreactive      Comment:      Assay performance characteristics have not been established for newborns,   infants, and children     HEMOGLOBIN A1C   Result Value Ref Range    Hemoglobin A1C 5.8 (H) 0 - 5.6 %      Comment:      Normal <5.7% Prediabetes 5.7-6.4%  Diabetes 6.5% or higher - adopted from ADA   consensus guidelines.     Lipid panel reflex to direct LDL Fasting   Result Value Ref Range    Cholesterol 175 <200 mg/dL    Triglycerides 128 <150 mg/dL    HDL Cholesterol 48 >39 mg/dL    LDL Cholesterol Calculated 101 (H) <100 mg/dL      Comment:      Above desirable:  100-129 mg/dl  Borderline High:  130-159 mg/dL  High:             160-189 mg/dL  Very high:       >189 mg/dl      Non HDL Cholesterol 127 <130 mg/dL   Albumin Random Urine Quantitative with Creat Ratio   Result Value Ref Range    Creatinine Urine 149 mg/dL    Albumin Urine mg/L 13 mg/L    Albumin Urine mg/g Cr 8.46 0 - 17 mg/g Cr   TSH WITH FREE T4 REFLEX   Result Value Ref Range    TSH 1.15 0.40 - 4.00 mU/L   Basic metabolic panel   Result Value Ref Range    Sodium 142 133 - 144 mmol/L    Potassium 4.2 3.4 - 5.3 mmol/L    Chloride 106 94 - 109 mmol/L    Carbon Dioxide 27 20 - 32 mmol/L    Anion Gap 9 3 - 14 mmol/L    Glucose 112 (H) 70 - 99 mg/dL    Urea Nitrogen 24 7 - 30 mg/dL    Creatinine 0.83 0.66 - 1.25 mg/dL    GFR Estimate 88  >60 mL/min/[1.73_m2]      Comment:      Non  GFR Calc  Starting 12/18/2018, serum creatinine based estimated GFR (eGFR) will be   calculated using the Chronic Kidney Disease Epidemiology Collaboration   (CKD-EPI) equation.      GFR Estimate If Black >90 >60 mL/min/[1.73_m2]      Comment:       GFR Calc  Starting 12/18/2018, serum creatinine based estimated GFR (eGFR) will be   calculated using the Chronic Kidney Disease Epidemiology Collaboration   (CKD-EPI) equation.      Calcium 9.0 8.5 - 10.1 mg/dL       If you have any questions or concerns, please call the clinic at the number listed above.       Sincerely,        Mina Walters, DO

## 2019-05-08 NOTE — PROGRESS NOTES
"SUBJECTIVE:   Ben Ceron is a 73 year old male who presents for Preventive Visit.  Are you in the first 12 months of your Medicare coverage?      Healthy Habits:    In general, how would you rate your overall health?  Good    Frequency of exercise:  2-3 days/week    Duration of exercise:  15-30 minutes    Do you usually eat at least 4 servings of fruit and vegetables a day, include whole grains    & fiber and avoid regularly eating high fat or \"junk\" foods?  Yes    Taking medications regularly:  Yes    Barriers to taking medications:  Not applicable    Medication side effects:  Not applicable    Ability to successfully perform activities of daily living:  No assistance needed    Home Safety:  No safety concerns identified    Hearing Impairment:  Difficulty following a conversation in a noisy restaurant or crowded room    In the past 6 months, have you been bothered by leaking of urine?  No    In general, how would you rate your overall mental or emotional health?  Good      PHQ-2 Total Score:    Additional concerns today:  Yes    Do you feel safe in your environment? Yes    Do you have a Health Care Directive? Yes: Advance Directive has been received and scanned.    Fall risk  Fallen 2 or more times in the past year?: No  Any fall with injury in the past year?: No    Cognitive Screening   1) Repeat 3 items (Leader, Season, Table)    2) Clock draw: NORMAL  3) 3 item recall: Recalls 3 objects  Results: 3 items recalled: COGNITIVE IMPAIRMENT LESS LIKELY    Mini-CogTM Copyright S Crow. Licensed by the author for use in [facility]; reprinted with permission ([email or web address]@.[email or web address]). All rights reserved.      Do you have sleep apnea, excessive snoring or daytime drowsiness?: yes    Reviewed and updated as needed this visit by clinical staff  Tobacco  Allergies  Meds  Med Hx  Surg Hx  Fam Hx  Soc Hx        Reviewed and updated as needed this visit by Provider        Social History     Tobacco Use     Smoking " status: Never Smoker     Smokeless tobacco: Never Used   Substance Use Topics     Alcohol use: Yes     Alcohol/week: 0.0 oz     Comment: occasional         Alcohol Use 12/8/2016   Prescreen: >3 drinks/day or >7 drinks/week? The patient does not drink >3 drinks per day nor >7 drinks per week.               Current providers sharing in care for this patient include:   Patient Care Team:  Mina Walters DO as PCP - General (Internal Medicine)  Mina Walters DO as Assigned PCP    The following health maintenance items are reviewed in Epic and correct as of today:  Health Maintenance   Topic Date Due     EYE EXAM Q1 YEAR  11/30/1946     HEPATITIS C SCREENING  11/30/1965     ZOSTER IMMUNIZATION (2 of 3) 10/31/2013     MEDICARE ANNUAL WELLNESS VISIT  12/08/2017     A1C Q6 MO  10/27/2018     TSH W/ FREE T4 REFLEX Q2 YEAR  12/08/2018     PHQ-2  01/01/2019     FOOT EXAM Q1 YEAR  04/27/2019     CREATININE Q1 YEAR  04/27/2019     LIPID MONITORING Q1 YEAR  04/27/2019     MICROALBUMIN Q1 YEAR  04/27/2019     FALL RISK ASSESSMENT  07/20/2019     COLONOSCOPY Q5 YR  11/12/2019     ADVANCE DIRECTIVE PLANNING Q5 YRS  04/27/2023     DTAP/TDAP/TD IMMUNIZATION (2 - Td) 07/15/2023     INFLUENZA VACCINE  Completed     PNEUMOCOCCAL IMMUNIZATION 65+ LOW/MEDIUM RISK  Completed     AORTIC ANEURYSM SCREENING (SYSTEM ASSIGNED)  Completed     IPV IMMUNIZATION  Aged Out     MENINGITIS IMMUNIZATION  Aged Out     Labs reviewed in EPIC  BP Readings from Last 3 Encounters:   05/08/19 138/82   09/21/18 128/68   09/17/18 114/70    Wt Readings from Last 3 Encounters:   05/08/19 135.5 kg (298 lb 12.8 oz)   09/21/18 135.2 kg (298 lb)   09/17/18 134.3 kg (296 lb)                  Patient Active Problem List   Diagnosis     Advance Care Planning     Essential hypertension with goal blood pressure less than 140/90     Morbid obesity with BMI of 45.0-49.9, adult (H)     Family history of colon cancer     Rash     LUCAS (obstructive  sleep apnea)     Benign non-nodular prostatic hyperplasia     Primary osteoarthritis of left knee     Hyperlipidemia LDL goal <100     Benign prostatic hyperplasia with urinary frequency     Primary osteoarthritis of right knee     Complex tear of medial meniscus of right knee as current injury, initial encounter     S/P arthroscopy of right knee     Strain of right biceps muscle, subsequent encounter     Past Surgical History:   Procedure Laterality Date     ARTHROSCOPY KNEE WITH MEDIAL MENISCECTOMY  6/6/2014    Procedure: ARTHROSCOPY KNEE WITH MEDIAL MENISCECTOMY;  Surgeon: Ramana Dominguez DO;  Location: [facility]     ARTHROSCOPY KNEE WITH MEDIAL MENISCECTOMY Right 8/3/2018    Procedure: ARTHROSCOPY KNEE WITH MEDIAL MENISCECTOMY;  Right knee arthroscopy with partial meniscectomy;  Surgeon: Ramana Dominguez DO;  Location: [facility] OR     COLONOSCOPY  11/29/2011    Polypectomy.     COLONOSCOPY N/A 11/12/2014    Procedure: COLONOSCOPY;  Surgeon: Brooks Burris MD;  Location: [facility] GI     HC COLONOSCOPY W BIOPSY  11/24/08     HC COLONOSCOPY W/WO BRUSH/WASH  11/21/2005    Polypectomy.     HC VASECTOMY UNILAT/BILAT W POSTOP SEMEN  1977    Vasectomy       Social History     Tobacco Use     Smoking status: Never Smoker     Smokeless tobacco: Never Used   Substance Use Topics     Alcohol use: Yes     Alcohol/week: 0.0 oz     Comment: occasional     Family History   Problem Relation Age of Onset     Cancer Mother         Lung Cancer     Cancer - colorectal Father         Stomach Cancer     Cancer Brother          Current Outpatient Medications   Medication Sig Dispense Refill     Acetaminophen (TYLENOL PO) As needed       ADVIL 200 MG OR TABS Reported on 5/2/2017 120 0     aspirin 81 MG tablet Take by mouth daily 30 tablet      Cholecalciferol (VITAMIN D) 2000 UNITS tablet Take 1 tablet by mouth daily.       clobetasol (TEMOVATE) 0.05 % cream Small amount to affected areas BID 60 g 1     desoximetasone (TOPICORT)  "0.25 % cream use as directed to legs 100 g 1     ezetimibe (ZETIA) 10 MG tablet Take 1 tablet (10 mg) by mouth daily 90 tablet 3     finasteride (PROSCAR) 5 MG tablet Take 1 tablet (5 mg) by mouth daily 90 tablet 3     losartan-hydrochlorothiazide (HYZAAR) 100-25 MG tablet Take 1 tablet by mouth daily 90 tablet 3     metFORMIN (GLUCOPHAGE) 500 MG tablet TAKE 1 TABLET BY MOUTH TWICE DAILY WITH MEALS 180 tablet 3     metoprolol succinate ER (TOPROL-XL) 100 MG 24 hr tablet Take 1 tablet (100 mg) by mouth daily 90 tablet 3     NIFEdipine ER (ADALAT CC) 60 MG 24 hr tablet Take 1 tablet (60 mg) by mouth daily 90 tablet 3     order for DME Equipment being ordered: CPAP and related hardware, mask and tubing as well heated humidity equipment. 1 Units 0     VITAMIN E 400 UNIT OR CAPS   0     Allergies   Allergen Reactions     Norvasc [Amlodipine] Muscle Pain (Myalgia)     Simvastatin Rash     Rash and edema     Next    Review of Systems  CONSTITUTIONAL: NEGATIVE for fever, chills, change in weight  INTEGUMENTARY/SKIN: NEGATIVE for worrisome rashes, moles or lesions  EYES: NEGATIVE for vision changes or irritation  ENT/MOUTH: NEGATIVE for ear, mouth and throat problems  RESP: NEGATIVE for significant cough or SOB  BREAST: NEGATIVE for masses, tenderness or discharge  CV: NEGATIVE for chest pain, palpitations or peripheral edema  GI: NEGATIVE for nausea, abdominal pain, heartburn, or change in bowel habits  : NEGATIVE for frequency, dysuria, or hematuria  MUSCULOSKELETAL: NEGATIVE for significant arthralgias or myalgia  NEURO: NEGATIVE for weakness, dizziness or paresthesias  ENDOCRINE: NEGATIVE for temperature intolerance, skin/hair changes  HEME: NEGATIVE for bleeding problems  PSYCHIATRIC: NEGATIVE for changes in mood or affect    OBJECTIVE:   /82 (BP Location: Left arm, Patient Position: Sitting, Cuff Size: Adult Large)   Pulse 76   Temp 97.2  F (36.2  C) (Temporal)   Resp 22   Ht 1.727 m (5' 8\")   Wt 135.5 kg " "(298 lb 12.8 oz)   SpO2 97%   BMI 45.43 kg/m   Estimated body mass index is 45.43 kg/m  as calculated from the following:    Height as of this encounter: 1.727 m (5' 8\").    Weight as of this encounter: 135.5 kg (298 lb 12.8 oz).  Physical Exam  GENERAL: healthy, alert and no distress  EYES: Eyes grossly normal to inspection, PERRL and conjunctivae and sclerae normal  HENT: ear canals and TM's normal, nose and mouth without ulcers or lesions  NECK: no adenopathy, no asymmetry, masses, or scars and thyroid normal to palpation  RESP: lungs clear to auscultation - no rales, rhonchi or wheezes  CV: regular rate and rhythm, normal S1 S2, no S3 or S4, no murmur, click or rub, no peripheral edema and peripheral pulses strong  ABDOMEN: soft, nontender, no hepatosplenomegaly, no masses and bowel sounds normal   (male): normal male genitalia without lesions or urethral discharge, no hernia  MS: no gross musculoskeletal defects noted, no edema  SKIN: no suspicious lesions or rashes  NEURO: Normal strength and tone, mentation intact and speech normal  PSYCH: mentation appears normal, affect normal/bright  LYMPH: no cervical, supraclavicular, axillary, or inguinal adenopathy    Diagnostic Test Results:  No results found for this or any previous visit (from the past 24 hour(s)).    ASSESSMENT / PLAN:       ICD-10-CM    1. Special screening for malignant neoplasms, colon Z12.11 GASTROENTEROLOGY ADULT REF PROCEDURE ONLY   2. Essential hypertension with goal blood pressure less than 140/90 I10 TSH WITH FREE T4 REFLEX     losartan-hydrochlorothiazide (HYZAAR) 100-25 MG tablet     metoprolol succinate ER (TOPROL-XL) 100 MG 24 hr tablet     Basic metabolic panel   3. Controlled type 2 diabetes mellitus without complication, without long-term current use of insulin (H) E11.9 HEMOGLOBIN A1C     Albumin Random Urine Quantitative with Creat Ratio     metFORMIN (GLUCOPHAGE) 500 MG tablet   4. Benign essential hypertension I10 NIFEdipine " "ER (ADALAT CC) 60 MG 24 hr tablet   5. Benign prostatic hyperplasia with urinary frequency N40.1 finasteride (PROSCAR) 5 MG tablet    R35.0    6. Hyperlipidemia LDL goal <130 E78.5 Lipid panel reflex to direct LDL Fasting     ezetimibe (ZETIA) 10 MG tablet   7. Need for hepatitis C screening test Z11.59 Hepatitis C Screen Reflex to HCV RNA Quant and Genotype   8. Screening for diabetic peripheral neuropathy Z13.89 FOOT EXAM  NO CHARGE [70699.114]   9. Morbid obesity with BMI of 45.0-49.9, adult (H) E66.01     Z68.42        End of Life Planning:  Patient currently has an advanced directive: Yes.  Practitioner is supportive of decision.    COUNSELING:  Reviewed preventive health counseling, as reflected in patient instructions       Regular exercise       Healthy diet/nutrition       Vision screening       Hearing screening       Dental care       Bladder control    BP Readings from Last 1 Encounters:   05/08/19 138/82     Estimated body mass index is 45.43 kg/m  as calculated from the following:    Height as of this encounter: 1.727 m (5' 8\").    Weight as of this encounter: 135.5 kg (298 lb 12.8 oz).      Weight management plan: Discussed healthy diet and exercise guidelines     reports that he has never smoked. He has never used smokeless tobacco.      Appropriate preventive services were discussed with this patient, including applicable screening as appropriate for cardiovascular disease, diabetes, osteopenia/osteoporosis, and glaucoma.  As appropriate for age/gender, discussed screening for colorectal cancer, prostate cancer, breast cancer, and cervical cancer. Checklist reviewing preventive services available has been given to the patient.    Reviewed patients plan of care and provided an AVS. The Basic Care Plan (routine screening as documented in Health Maintenance) for Ben meets the Care Plan requirement. This Care Plan has been established and reviewed with the Patient.    Counseling Resources:  ATP IV " Guidelines  Pooled Cohorts Equation Calculator  Breast Cancer Risk Calculator  FRAX Risk Assessment  ICSI Preventive Guidelines  Dietary Guidelines for Americans, 2010  [employer]'s MyPlate  ASA Prophylaxis  Lung CA Screening    Mina Walters DO  [facility]    Identified Health Risks:

## 2019-05-08 NOTE — TELEPHONE ENCOUNTER
The patient's last colonoscopy was about 5 years ago and a 5-year repeat was recommended by the gastroenterologist.  However, that scope was normal and I suppose we can abide by their 10-year rule.    Selena

## 2019-05-08 NOTE — TELEPHONE ENCOUNTER
"Received a call from [place]. Patient insurance will not cover a screening colonoscopy until 11/1/2024.    I informed patient, he states he has hx of polyps and family hx of colon cancer.  He states if this is as a diagnostic due to that above, insurance has covered in the past.  I informed patient to call insurance to verify.  I will also ask PCP if a new \"diagnostic\" order can be placed.      "

## 2019-05-08 NOTE — TELEPHONE ENCOUNTER
I notified patient of note below. He does not agree with this and plans to call his insurance to see how to get this covered.     Patrica Kearns MA     5/8/2019

## 2019-05-08 NOTE — TELEPHONE ENCOUNTER
Date of colonoscopy/EGD: 5/13/19  Surgeon: Dr. Gonsales  Prep:Miralax  Packet:Colonoscopy/EGD instructions was given to patient in clinic.   Date: 5/8/2019      Surgery Scheduler

## 2019-05-21 NOTE — RESULT ENCOUNTER NOTE
Dear Ben, your recent test results are attached.  The microalbumin is normal.  The hepatitis C screening test is negative.  The thyroid is well adjusted.  The cholesterol is well controlled with an LDL of 101.  The chemistry panel shows a mildly elevated blood sugar 112 and normal kidney function.  In the hemoglobin A1c is excellent at 5.8 suggesting excellent blood sugar control.    Feel free to contact me via the office or My Chart if you have any questions regarding the above.

## 2019-09-09 ENCOUNTER — TELEPHONE (OUTPATIENT)
Dept: ORTHOPEDICS | Facility: CLINIC | Age: 74
End: 2019-09-09

## 2019-09-09 NOTE — TELEPHONE ENCOUNTER
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 Right knee reinjured having pain and swelling erc or pmc. Thank You Gail  Or could you get a new MRI ordered and then see him for follow-up having lots of pain tripped over dead stump. Per pt having more pain then before surgery     Phone Number Patient can be reached at: Home number on file [phone] (home)    Best Time: any time today please    Can we leave a detailed message on this number? YES    Call taken on 9/9/2019 at 9:21 AM by Gail Morrison

## 2019-09-09 NOTE — TELEPHONE ENCOUNTER
Spoke to the patient. He reports that he is having some moderate swelling and 7/10 pain in his right knee. He had a right knee arthroscopy with partial meniscectomy on 8/3/2018.      He is requesting to be worked in with Dr. Dominguez this week if possible, willing to drive to [place] if needed. He has been using ice, elevation and ibuprofen. He will implement resting, compression and alternating ibuprofen and tylenol.      He was informed that Dr. Dominguez is not in clinic until Wednesday so we may not get a response regarding this work in request until then.  He was educated about the s/s that would warrant seeking emergent care.     Nancy SIGALA RN. . .  9/9/2019, 9:52 AM

## 2019-09-10 NOTE — TELEPHONE ENCOUNTER
Patient called stating his knee is much improved since yesterday and wants to wait on all future tests/ appointments.  Thanks

## 2020-05-08 DIAGNOSIS — I10 ESSENTIAL HYPERTENSION WITH GOAL BLOOD PRESSURE LESS THAN 140/90: ICD-10-CM

## 2020-05-08 DIAGNOSIS — R35.0 BENIGN PROSTATIC HYPERPLASIA WITH URINARY FREQUENCY: ICD-10-CM

## 2020-05-08 DIAGNOSIS — I10 BENIGN ESSENTIAL HYPERTENSION: ICD-10-CM

## 2020-05-08 DIAGNOSIS — N40.1 BENIGN PROSTATIC HYPERPLASIA WITH URINARY FREQUENCY: ICD-10-CM

## 2020-05-08 DIAGNOSIS — E11.9 CONTROLLED TYPE 2 DIABETES MELLITUS WITHOUT COMPLICATION, WITHOUT LONG-TERM CURRENT USE OF INSULIN (H): ICD-10-CM

## 2020-05-11 RX ORDER — METOPROLOL SUCCINATE 100 MG/1
TABLET, EXTENDED RELEASE ORAL
Qty: 90 TABLET | Refills: 0 | Status: SHIPPED | OUTPATIENT
Start: 2020-05-11 | End: 2020-05-14

## 2020-05-11 RX ORDER — FINASTERIDE 5 MG/1
1 TABLET, FILM COATED ORAL DAILY
Qty: 90 TABLET | Refills: 3 | Status: SHIPPED | OUTPATIENT
Start: 2020-05-11 | End: 2021-06-29

## 2020-05-11 NOTE — TELEPHONE ENCOUNTER
Routing to team to set up phone appointment for patient for med check, BP and diabetes.  Please route to provider when appointment has been scheduled for completion of refill.    Routing refill request to provider for review/approval because:  Labs not current:  BP, Cr  Patient needs to be seen because it has been more than 1 year since last office visit.  All of the listed medications  Last Written Prescription Date:  5/8/19  Last Fill Quantity: 1 year  Last office visit: 5/8/2019 with prescribing provider:     Future Office Visit:      CORI RobertN, RN

## 2020-05-11 NOTE — TELEPHONE ENCOUNTER
Patient is scheduled for 05/14/20.  Wondering if you are able to fill those before his appt.  He needs them.  Donna Long, CMA

## 2020-05-14 ENCOUNTER — VIRTUAL VISIT (OUTPATIENT)
Dept: FAMILY MEDICINE | Facility: CLINIC | Age: 75
End: 2020-05-14
Payer: COMMERCIAL

## 2020-05-14 DIAGNOSIS — M16.10 ARTHRITIS OF HIP: Primary | ICD-10-CM

## 2020-05-14 DIAGNOSIS — E11.9 CONTROLLED TYPE 2 DIABETES MELLITUS WITHOUT COMPLICATION, WITHOUT LONG-TERM CURRENT USE OF INSULIN (H): ICD-10-CM

## 2020-05-14 DIAGNOSIS — I10 BENIGN ESSENTIAL HYPERTENSION: ICD-10-CM

## 2020-05-14 PROCEDURE — 99214 OFFICE O/P EST MOD 30 MIN: CPT | Mod: 95 | Performed by: INTERNAL MEDICINE

## 2020-05-14 RX ORDER — LOSARTAN POTASSIUM AND HYDROCHLOROTHIAZIDE 25; 100 MG/1; MG/1
1 TABLET ORAL DAILY
Qty: 90 TABLET | Refills: 3 | Status: SHIPPED | OUTPATIENT
Start: 2020-05-14 | End: 2021-05-05

## 2020-05-14 RX ORDER — METOPROLOL SUCCINATE 100 MG/1
100 TABLET, EXTENDED RELEASE ORAL DAILY
Qty: 90 TABLET | Refills: 3 | Status: SHIPPED | OUTPATIENT
Start: 2020-05-14 | End: 2021-08-06

## 2020-05-14 NOTE — PROGRESS NOTES
"Ben Ceron is a 74 year old male who is being evaluated via a billable telephone visit.      The patient has been notified of following:     \"This telephone visit will be conducted via a call between you and your physician/provider. We have found that certain health care needs can be provided without the need for a physical exam.  This service lets us provide the care you need with a short phone conversation.  If a prescription is necessary we can send it directly to your pharmacy.  If lab work is needed we can place an order for that and you can then stop by our lab to have the test done at a later time.    Telephone visits are billed at different rates depending on your insurance coverage. During this emergency period, for some insurers they may be billed the same as an in-person visit.  Please reach out to your insurance provider with any questions.    If during the course of the call the physician/provider feels a telephone visit is not appropriate, you will not be charged for this service.\"    Patient has given verbal consent for Telephone visit?  Yes    What phone number would you like to be contacted at? [phone]    How would you like to obtain your AVS? Mail a copy    Subjective                       Chief Complaint         The patient is a pleasant 74-year-old gentleman who is seen virtually today for follow-up of his hypertension and diabetes.  He notes that he has been playing golf regularly and getting more exercise.  [place]  He notes no side effects or complications from the medication.  He has had no lapse in compliance.  His blood sugars are generally under 130 and his most recent glycosylated hemoglobin which was a year ago was exemplary.  He denies any foot injuries or diabetic sores.  He has no symptoms of intermittent claudication.  Denies cardiac concerns including chest pain or ischemia or congestive heart failure.                    "      PAST, FAMILY,SOCIAL HISTORY:     Medical  History:   has a past medical history of Benign non-nodular prostatic hyperplasia, presence of lower urinary tract symptoms unspecified (5/31/2016), Colonic polyps, Family history of colon cancer (4/4/2016), Hyperlipidemia LDL goal <100 (5/3/2017), Hyperlipidemia LDL goal <130 (12/8/2016), LUCAS (obstructive sleep apnea) (4/4/2016), and Rash (4/4/2016).     Surgical History:   has a past surgical history that includes VASECTOMY UNILAT/BILAT W POSTOP SEMEN (1977); Colonoscopy w/wo [place] **Performed** (11/21/2005); COLONOSCOPY W BIOPSY (11/24/08); colonoscopy (11/29/2011); Arthroscopy knee with medial meniscectomy (6/6/2014); Colonoscopy (N/A, 11/12/2014); and Arthroscopy knee with medial meniscectomy (Right, 8/3/2018).     Social History:   reports that he has never smoked. He has never used smokeless tobacco. He reports current alcohol use. He reports that he does not use drugs.     Family History:  family history includes Cancer in his brother and mother; Cancer - colorectal in his father.            MEDICATIONS  Current Outpatient Medications   Medication Sig Dispense Refill     Acetaminophen (TYLENOL PO) As needed       ADVIL 200 MG OR TABS Reported on 5/2/2017 120 0     aspirin 81 MG tablet Take by mouth daily 30 tablet      Cholecalciferol (VITAMIN D) 2000 UNITS tablet Take 1 tablet by mouth daily.       clobetasol (TEMOVATE) 0.05 % cream Small amount to affected areas BID 60 g 1     desoximetasone (TOPICORT) 0.25 % cream use as directed to legs 100 g 1     ezetimibe (ZETIA) 10 MG tablet Take 1 tablet (10 mg) by mouth daily 90 tablet 3     finasteride (PROSCAR) 5 MG tablet Take 1 tablet (5 mg) by mouth daily 90 tablet 3     losartan-hydrochlorothiazide (HYZAAR) 100-25 MG tablet Take 1 tablet by mouth daily 90 tablet 3     metFORMIN (GLUCOPHAGE) 500 MG tablet Take 1 tablet (500 mg) by mouth 2 times daily (with meals) 180 tablet 3     metoprolol succinate ER  (TOPROL-XL) 100 MG 24 hr tablet Take 1 tablet (100 mg) by mouth daily 90 tablet 3     NIFEdipine ER (ADALAT CC) 60 MG 24 hr tablet Take 1 tablet (60 mg) by mouth daily 90 tablet 3     order for DME Equipment being ordered: CPAP and related hardware, mask and tubing as well heated humidity equipment. 1 Units 0     VITAMIN E 400 UNIT OR CAPS   0         --------------------------------------------------------------------------------------------------------------------                              Review of Systems       LUNGS: Pt denies: cough, excess sputum, hemoptysis, or shortness of breath.   HEART: Pt denies: chest pain, arrhythmia, syncope, tachy or bradyarrhythmia.   GI: Pt denies: nausea, vomiting, diarrhea, constipation, melena, or hematochezia.   NEURO: Pt denies: seizures, strokes, diplopia, weakness, paraesthesias, or paralysis.   SKIN: Pt denies: itching, rashes, discoloration, or specific lesions of concern. Denies recent hair loss.   PSYCH: The patient denies significant depression, anxiety, mood imbalance. Specifically denies any suicidal ideation.           MS: Pt denies: significant joint redness, swelling, or acute loss of function. Able to ambulate with little or no assistance.  He does have pain in both hips.  He notes that this is worsened with ambulation.  He believes it in the near future he will be requiring total hip arthroplasty and is anticipating this.  He states that the pain worsens with extended ambulation.  If he rests for a few minutes he gets better with standing and moving causes her to recur.                            No physical examination is performed as this is a virtual visit.  The patient's voice is strong, there is no evidence of labored breathing or wheezing.  The patient is alert and appropriate and demonstrates no obvious behavioral irregularities.         Decision Making       1. Controlled type 2 diabetes mellitus without complication, without long-term current use of  insulin (H)  Continue current medication  Continue current low-cholesterol diet  Laboratory work in the upcoming future  - metFORMIN (GLUCOPHAGE) 500 MG tablet; Take 1 tablet (500 mg) by mouth 2 times daily (with meals)  Dispense: 180 tablet; Refill: 3    2. Benign essential hypertension  Continue current medications low-salt diet  - NIFEdipine ER (ADALAT CC) 60 MG 24 hr tablet; Take 1 tablet (60 mg) by mouth daily  Dispense: 90 tablet; Refill: 3    3. Arthritis of hip  Anticipate orthopedic work-up upon the conclusion of the pandemic                             FOLLOW UP   I have asked the patient to make an appointment for followup with me in 4 months for face-to-face with lab work                I have carefully explained the diagnosis and treatment options to the patient.  The patient has displayed an understanding of the above, and all subsequent questions were answered.      DO ELIZABETH Tran    Portions of this note were produced using [employer]  Although every attempt at real-time proof reading has been made, occasional grammar/syntax errors may have been missed.      Telephone time: 18 minutes

## 2020-09-15 ENCOUNTER — OFFICE VISIT (OUTPATIENT)
Dept: INTERNAL MEDICINE | Facility: CLINIC | Age: 75
End: 2020-09-15
Payer: COMMERCIAL

## 2020-09-15 ENCOUNTER — TELEPHONE (OUTPATIENT)
Dept: FAMILY MEDICINE | Facility: OTHER | Age: 75
End: 2020-09-15

## 2020-09-15 VITALS
SYSTOLIC BLOOD PRESSURE: 136 MMHG | HEART RATE: 72 BPM | WEIGHT: 293.4 LBS | HEIGHT: 68 IN | RESPIRATION RATE: 16 BRPM | OXYGEN SATURATION: 98 % | DIASTOLIC BLOOD PRESSURE: 78 MMHG | TEMPERATURE: 97.7 F | BODY MASS INDEX: 44.47 KG/M2

## 2020-09-15 DIAGNOSIS — Z00.00 MEDICARE ANNUAL WELLNESS VISIT, SUBSEQUENT: ICD-10-CM

## 2020-09-15 DIAGNOSIS — E11.9 TYPE 2 DIABETES MELLITUS WITHOUT COMPLICATION, WITHOUT LONG-TERM CURRENT USE OF INSULIN (H): ICD-10-CM

## 2020-09-15 DIAGNOSIS — H90.6 MIXED CONDUCTIVE AND SENSORINEURAL HEARING LOSS OF BOTH EARS: ICD-10-CM

## 2020-09-15 DIAGNOSIS — I10 ESSENTIAL HYPERTENSION WITH GOAL BLOOD PRESSURE LESS THAN 140/90: ICD-10-CM

## 2020-09-15 DIAGNOSIS — R19.4 BOWEL HABIT CHANGES: ICD-10-CM

## 2020-09-15 DIAGNOSIS — E78.5 HYPERLIPIDEMIA LDL GOAL <100: Primary | ICD-10-CM

## 2020-09-15 DIAGNOSIS — M79.671 RIGHT FOOT PAIN: ICD-10-CM

## 2020-09-15 LAB
ANION GAP SERPL CALCULATED.3IONS-SCNC: 3 MMOL/L (ref 3–14)
BUN SERPL-MCNC: 21 MG/DL (ref 7–30)
CALCIUM SERPL-MCNC: 9.3 MG/DL (ref 8.5–10.1)
CHLORIDE SERPL-SCNC: 105 MMOL/L (ref 94–109)
CHOLEST SERPL-MCNC: 215 MG/DL
CO2 SERPL-SCNC: 30 MMOL/L (ref 20–32)
CREAT SERPL-MCNC: 0.98 MG/DL (ref 0.66–1.25)
CREAT UR-MCNC: 308 MG/DL
GFR SERPL CREATININE-BSD FRML MDRD: 76 ML/MIN/{1.73_M2}
GLUCOSE SERPL-MCNC: 112 MG/DL (ref 70–99)
HBA1C MFR BLD: 5.6 % (ref 0–5.6)
HDLC SERPL-MCNC: 52 MG/DL
LDLC SERPL CALC-MCNC: 120 MG/DL
MICROALBUMIN UR-MCNC: 37 MG/L
MICROALBUMIN/CREAT UR: 12.05 MG/G CR (ref 0–17)
NONHDLC SERPL-MCNC: 163 MG/DL
POTASSIUM SERPL-SCNC: 4 MMOL/L (ref 3.4–5.3)
SODIUM SERPL-SCNC: 138 MMOL/L (ref 133–144)
TRIGL SERPL-MCNC: 213 MG/DL

## 2020-09-15 PROCEDURE — 82043 UR ALBUMIN QUANTITATIVE: CPT | Performed by: INTERNAL MEDICINE

## 2020-09-15 PROCEDURE — 99213 OFFICE O/P EST LOW 20 MIN: CPT | Mod: 25 | Performed by: INTERNAL MEDICINE

## 2020-09-15 PROCEDURE — 36415 COLL VENOUS BLD VENIPUNCTURE: CPT | Performed by: INTERNAL MEDICINE

## 2020-09-15 PROCEDURE — 80061 LIPID PANEL: CPT | Performed by: INTERNAL MEDICINE

## 2020-09-15 PROCEDURE — 99397 PER PM REEVAL EST PAT 65+ YR: CPT | Performed by: INTERNAL MEDICINE

## 2020-09-15 PROCEDURE — 83036 HEMOGLOBIN GLYCOSYLATED A1C: CPT | Performed by: INTERNAL MEDICINE

## 2020-09-15 PROCEDURE — 80048 BASIC METABOLIC PNL TOTAL CA: CPT | Performed by: INTERNAL MEDICINE

## 2020-09-15 RX ORDER — LANCETS
EACH MISCELLANEOUS
Qty: 100 EACH | Refills: 6 | Status: SHIPPED | OUTPATIENT
Start: 2020-09-15

## 2020-09-15 RX ORDER — GLUCOSAMINE HCL/CHONDROITIN SU 500-400 MG
CAPSULE ORAL
Qty: 100 EACH | Refills: 3 | Status: SHIPPED | OUTPATIENT
Start: 2020-09-15

## 2020-09-15 ASSESSMENT — PAIN SCALES - GENERAL: PAINLEVEL: EXTREME PAIN (8)

## 2020-09-15 ASSESSMENT — MIFFLIN-ST. JEOR: SCORE: 2055.35

## 2020-09-15 NOTE — LETTER

## 2020-09-15 NOTE — LETTER
September 22, 2020      Ben Ceron  [address] [facility] [address]        Dear Ben, your recent test results are attached.     The cholesterol slight elevated with an LDL of 120.  This is substantially higher than previous values.   The chemistry panel shows a slightly elevated blood sugar of 112 with normal kidney function.   The hemoglobin A1c is 5.6 which is consistent with excellent blood sugar control.   Given your simvastatin allergy, please continue the Zetia and try to maintain a low-fat diet.     You will be contacted with any outstanding results as they are available.   Feel free to contact me via the office or My Chart if you have any questions regarding the above.    Resulted Orders   HEMOGLOBIN A1C   Result Value Ref Range    Hemoglobin A1C 5.6 0 - 5.6 %      Comment:      Normal <5.7% Prediabetes 5.7-6.4%  Diabetes 6.5% or higher - adopted from ADA   consensus guidelines.     BASIC METABOLIC PANEL   Result Value Ref Range    Sodium 138 133 - 144 mmol/L    Potassium 4.0 3.4 - 5.3 mmol/L    Chloride 105 94 - 109 mmol/L    Carbon Dioxide 30 20 - 32 mmol/L    Anion Gap 3 3 - 14 mmol/L    Glucose 112 (H) 70 - 99 mg/dL      Comment:      Fasting specimen    Urea Nitrogen 21 7 - 30 mg/dL    Creatinine 0.98 0.66 - 1.25 mg/dL    GFR Estimate 76 >60 mL/min/[1.73_m2]      Comment:      Non  GFR Calc  Starting 12/18/2018, serum creatinine based estimated GFR (eGFR) will be   calculated using the Chronic Kidney Disease Epidemiology Collaboration   (CKD-EPI) equation.      GFR Estimate If Black 89 >60 mL/min/[1.73_m2]      Comment:       GFR Calc  Starting 12/18/2018, serum creatinine based estimated GFR (eGFR) will be   calculated using the Chronic Kidney Disease Epidemiology Collaboration   (CKD-EPI) equation.      Calcium 9.3 8.5 - 10.1 mg/dL   Lipid panel reflex to direct LDL Fasting   Result Value Ref Range    Cholesterol 215 (H) <200 mg/dL      Comment:      Desirable:        <200 mg/dl    Triglycerides 213 (H) <150 mg/dL      Comment:      Borderline high:  150-199 mg/dl  High:             200-499 mg/dl  Very high:       >499 mg/dl  Fasting specimen      HDL Cholesterol 52 >39 mg/dL    LDL Cholesterol Calculated 120 (H) <100 mg/dL      Comment:      Above desirable:  100-129 mg/dl  Borderline High:  130-159 mg/dL  High:             160-189 mg/dL  Very high:       >189 mg/dl      Non HDL Cholesterol 163 (H) <130 mg/dL      Comment:      Above Desirable:  130-159 mg/dl  Borderline high:  160-189 mg/dl  High:             190-219 mg/dl  Very high:       >219 mg/dl     Albumin Random Urine Quantitative with Creat Ratio   Result Value Ref Range    Creatinine Urine 308 mg/dL    Albumin Urine mg/L 37 mg/L    Albumin Urine mg/g Cr 12.05 0 - 17 mg/g Cr       If you have any questions or concerns, please call the clinic at the number listed above.       Sincerely,        Mina Walters,

## 2020-09-15 NOTE — PROGRESS NOTES
"Subjective     Ben Ceron is a 74 year old male who presents to clinic today for the following health issues:    HPI       Chief Complaint   Patient presents with     Musculoskeletal Problem     closed sore on ball of left foot, for 1 month. Painful, no drainage        Annual Wellness Visit    Are you in the first 12 months of your Medicare Part B coverage?  No    Physical Health:    In general, how would you rate your overall physical health? good    Outside of work, how many days during the week do you exercise?none    Outside of work, approximately how many minutes a day do you exercise?not applicable    If you drink alcohol do you typically have >3 drinks per day or >7 drinks per week? No    Do you usually eat at least 4 servings of fruit and vegetables a day, include whole grains & fiber and avoid regularly eating high fat or \"junk\" foods? Yes    Do you have any problems taking medications regularly? No    Do you have any side effects from medications? none    Needs assistance for the following daily activities: no assistance needed    Which of the following safety concerns are present in your home?  none identified     Hearing impairment: Yes, Difficulty following a conversation in a noisy restaurant or crowded room.    In the past 6 months, have you been bothered by leaking of urine? no    Mental Health:    In general, how would you rate your overall mental or emotional health? excellent  PHQ-2 Score: 0    Do you feel safe in your environment? Yes    Have you ever done Advance Care Planning? (For example, a Health Directive, POLST, or a discussion with a medical provider or your loved ones about your wishes)? Yes, advance care planning is on file.    Fall risk:  Fallen 2 or more times in the past year?: No  Any fall with injury in the past year?: No    Cognitive Screenin) Repeat 3 items (Leader, Season, Table)    2) Clock draw: NORMAL  3) 3 item recall: Recalls 3 objects  Results: 3 items recalled: " "COGNITIVE IMPAIRMENT LESS LIKELY    Mini-CogTM Copyright S Crow. Licensed by the author for use in [facility]; reprinted with permission ([email or web address]@.[email or web address]). All rights reserved.      Do you have sleep apnea, excessive snoring or daytime drowsiness?: no    Current providers sharing in care for this patient include:   Patient Care Team:  Mina Walters DO as PCP - General (Internal Medicine)  Mina Walters DO as Assigned PCP      Review of Systems   CONSTITUTIONAL: NEGATIVE for fever, chills, change in weight  INTEGUMENTARY/SKIN: NEGATIVE for worrisome rashes, moles or lesions  EYES: NEGATIVE for vision changes or irritation  ENT/MOUTH: NEGATIVE for ear, mouth and throat problems  RESP: NEGATIVE for significant cough or SOB  BREAST: NEGATIVE for masses, tenderness or discharge  CV: NEGATIVE for chest pain, palpitations or peripheral edema  GI: NEGATIVE for nausea, abdominal pain, heartburn.  He has had increased diarrhea and intermittent constipation.  Has noted some scant blood in stools.  : NEGATIVE for frequency, dysuria, or hematuria  MUSCULOSKELETAL: Patient has positive right foot pain.  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  He has very flat feet and pain worsens with any ambulation.  NEURO: NEGATIVE for weakness, dizziness or paresthesias  ENDOCRINE: NEGATIVE for temperature intolerance, skin/hair changes  HEME: NEGATIVE for bleeding problems  PSYCHIATRIC: NEGATIVE for changes in mood or affect      Objective    /78 (BP Location: Right arm, Patient Position: Sitting, Cuff Size: Adult Large)   Pulse 72   Temp 97.7  F (36.5  C) (Temporal)   Resp 16   Ht 1.727 m (5' 8\")   Wt 133.1 kg (293 lb 6.4 oz)   SpO2 98%   BMI 44.61 kg/m    Body mass index is 44.61 kg/m .  Physical Exam   GENERAL: healthy, alert and no distress  EYES: Eyes grossly normal to inspection, PERRL and conjunctivae and sclerae normal  HENT: ear canals and TM's normal, nose and mouth without " ulcers or lesions.  Hearing decreased bilaterally.  NECK: no adenopathy, no asymmetry, masses, or scars and thyroid normal to palpation  RESP: lungs clear to auscultation - no rales, rhonchi or wheezes  CV: regular rate and rhythm, normal S1 S2, no S3 or S4, no murmur, click or rub, no peripheral edema and peripheral pulses strong  ABDOMEN: soft, nontender, no hepatosplenomegaly, no masses and bowel sounds normal  MS: Significant foot pain with lateral compression of right forefoot.  Significant pes planus bilaterally.  No ulceration or neuropathy seen.  SKIN: no suspicious lesions or rashes  NEURO: Normal strength and tone, mentation intact and speech normal  PSYCH: mentation appears normal, affect normal/bright  LYMPH: no cervical, supraclavicular, axillary, or inguinal adenopathy  Diabetic foot exam: normal DP and PT pulses, no trophic changes or ulcerative lesions and normal sensory exam    No results found for this or any previous visit (from the past 24 hour(s)).        Assessment & Plan     Right foot pain  We will set up with orthopedics for podiatric evaluation  - Orthopedic & Spine  Referral    Hyperlipidemia LDL goal <100  Check lipid levels, consider statin therapy if indicated.  Continue Zetia in the meantime  - Lipid panel reflex to direct LDL Fasting    Essential hypertension with goal blood pressure less than 140/90  Currently controlled.  Continue medication, check renal function  - BASIC METABOLIC PANEL  - Albumin Random Urine Quantitative with Creat Ratio    Type 2 diabetes mellitus without complication, without long-term current use of insulin (H)  Check A1c, obtain blood sugar testing supplies for patient.  - HEMOGLOBIN A1C  - BASIC METABOLIC PANEL  - Albumin Random Urine Quantitative with Creat Ratio  - blood glucose monitoring (NO BRAND SPECIFIED) meter device kit; Use to test blood sugar 1 times daily or as directed. Preferred blood glucose meter OR supplies to accompany: Blood  "Glucose Monitor Brands: per insurance.  - blood glucose (NO BRAND SPECIFIED) test strip; Use to test blood sugar 1 times daily or as directed. To accompany: Blood Glucose Monitor Brands: per insurance.  - blood glucose calibration (NO BRAND SPECIFIED) solution; To accompany: Blood Glucose Monitor Brands: per insurance.  - thin (NO BRAND SPECIFIED) lancets; Use with lanceting device. To accompany: Blood Glucose Monitor Brands: per insurance.  - alcohol swab prep pads; Use to swab area of injection/federica as directed.    Bowel habit changes  Intermittent diarrhea and constipation with questionable red blood in stool.  - GASTROENTEROLOGY ADULT REF PROCEDURE ONLY; Future    Mixed conductive and sensorineural hearing loss of both ears  Set up with audiology- AUDIOLOGY ADULT REFERRAL; Future    Medicare annual wellness visit, subsequent  Done  - C ANNUAL WELLNESS VISIT, PPS, SUBSEQUENT     BMI:   Estimated body mass index is 44.61 kg/m  as calculated from the following:    Height as of this encounter: 1.727 m (5' 8\").    Weight as of this encounter: 133.1 kg (293 lb 6.4 oz).               No follow-ups on file.    Mina Walters, DO  [facility]    "

## 2020-09-15 NOTE — LETTER
[facility]  [address] [facility] [address]  [phone]        September 17, 2020    Ben Ceron  [address] [place] [id number]

## 2020-09-15 NOTE — TELEPHONE ENCOUNTER
Left message for patient to return call to schedule colonoscopy or EGD. If Lu or Kerline are unavailable, please transfer to the surgery center.

## 2020-09-16 NOTE — TELEPHONE ENCOUNTER
Left message for patient to return call to schedule EGD/colonoscopy. If Kerline or Lu are not available, please transfer to same day surgery

## 2020-09-17 DIAGNOSIS — Z11.59 ENCOUNTER FOR SCREENING FOR OTHER VIRAL DISEASES: Primary | ICD-10-CM

## 2020-09-17 NOTE — TELEPHONE ENCOUNTER
Date of colonoscopy/EGD: 9/25/20  Surgeon: Dr. Burris  Prep:Miralax  Packet:Colonoscopy/EGD instructions mailed to patient's home address.   Date: 9/17/2020      Surgery Scheduler

## 2020-09-21 DIAGNOSIS — Z11.59 ENCOUNTER FOR SCREENING FOR OTHER VIRAL DISEASES: ICD-10-CM

## 2020-09-21 PROCEDURE — U0003 INFECTIOUS AGENT DETECTION BY NUCLEIC ACID (DNA OR RNA); SEVERE ACUTE RESPIRATORY SYNDROME CORONAVIRUS 2 (SARS-COV-2) (CORONAVIRUS DISEASE [COVID-19]), AMPLIFIED PROBE TECHNIQUE, MAKING USE OF HIGH THROUGHPUT TECHNOLOGIES AS DESCRIBED BY CMS-2020-01-R: HCPCS | Performed by: INTERNAL MEDICINE

## 2020-09-22 ENCOUNTER — OFFICE VISIT (OUTPATIENT)
Dept: PODIATRY | Facility: CLINIC | Age: 75
End: 2020-09-22
Attending: INTERNAL MEDICINE
Payer: COMMERCIAL

## 2020-09-22 ENCOUNTER — ANCILLARY PROCEDURE (OUTPATIENT)
Dept: GENERAL RADIOLOGY | Facility: CLINIC | Age: 75
End: 2020-09-22
Attending: PODIATRIST
Payer: COMMERCIAL

## 2020-09-22 VITALS
WEIGHT: 293.4 LBS | BODY MASS INDEX: 44.47 KG/M2 | DIASTOLIC BLOOD PRESSURE: 64 MMHG | HEIGHT: 68 IN | SYSTOLIC BLOOD PRESSURE: 118 MMHG

## 2020-09-22 DIAGNOSIS — M77.8 CAPSULITIS OF FOOT, LEFT: Primary | ICD-10-CM

## 2020-09-22 DIAGNOSIS — M79.672 LEFT FOOT PAIN: ICD-10-CM

## 2020-09-22 LAB
SARS-COV-2 RNA SPEC QL NAA+PROBE: NOT DETECTED
SPECIMEN SOURCE: NORMAL

## 2020-09-22 PROCEDURE — 73630 X-RAY EXAM OF FOOT: CPT | Mod: TC

## 2020-09-22 PROCEDURE — 99203 OFFICE O/P NEW LOW 30 MIN: CPT | Performed by: PODIATRIST

## 2020-09-22 ASSESSMENT — MIFFLIN-ST. JEOR: SCORE: 2055.35

## 2020-09-22 ASSESSMENT — PAIN SCALES - GENERAL: PAINLEVEL: EXTREME PAIN (8)

## 2020-09-22 NOTE — PATIENT INSTRUCTIONS
"DIABETES AND YOUR FEET    What effect does diabetes have on the feet?  Diabetes can result in several problems in the feet including contractures of the tendons leading to deformities and reduced function of the bones, skin ulcers or open sores on pressure points or prominent deformities, reduced sensation, reduced blood flow and thus reduced oxygen and immune cells to the tiny vessels in our feet. This all leads to higher risk of hospitalization, infections, and amputations.     What is neuropathy?  Neuropathy is a term used to describe a loss of nerve function.  Patients with diabetes are at risk of developing neuropathy if their sugars continue to run high and are above the normal value of 140.  The elevated blood sugar in the body enters the nerves causing it to swell and impair nerve function.  The higher the blood sugar and the longer it is elevated, the more damage is done to nerves.  This damage is permanent and irreversible.  These damaged sensory nerves can then cause reduced feeling or cause pain.  Damaged motor nerves can reduce blood flow and white blood cells into into your foot, skin and bones reducing your ability to heal a small problem. And neuropathy can cause tendons to become unbalanced and contribute to the formation of deformity and contractures in our feet. Often times, neuropathy can be prevented by controlling your blood sugar.  Your risk of developing neuropathy goes up dramatically as your hemoglobin A1C raises above 7.5.      How do I know if I have neuropathy?  When a person develops neuropathy, they usually begin to feel numbness or tingling in their feet and sometimes in their legs.  Other symptoms may include painful burning or hot feet, tingling, electrical sensations or feeling like insects or ants are crawling on your feet or legs.  If blood sugar remains above 140  for long periods of time, neuropathy can also occur in the hands.  When a person loses their \"protective threshold\" " or ability to detect a 5.[address] [place] monofilament is when they have elevated risk for developing foot deformity, contractures, foot infections, amputations, Charcot arthropathy, or other complications. Keep your hemoglobin A1C below 7.5 to reduce this risk.    What is vascular disease?  Peripheral vascular disease is a term used to describe a loss or decrease in circulation (blood flow).  There is a problem in getting blood, immune cells, and oxygen to areas that need it.  Similar to neuropathy, sugars can build up in the walls of the arteries (blood vessels) and cause them to become swollen, thickened and hardened.  This decreases the amount of blood that can go to an area that needs it.  Though this is common in the legs of diabetic patients, it can also affect other arteries (blood vessels) in the body such as in the heart, kidney, eyes, and the blood flow into bones.  It is often seen first in the small vessels of her body notably our feet and toes.    How do I know if I have vascular disease?  In the legs, vascular disease usually results in cramping.  Patients who develop leg cramps after walking the same distance every time (i.e. One block, half a mile, ect.) need to let their doctors know so that their circulation may be checked.  Cramps causing severe pain in the feet and/or legs while sleeping and the cramps go away when you stand or hang your legs off the side of the bed, may also be a sign of poor blood circulation.  Occasional cramping in cold weather or on rare occasions with activity may not be due to poor circulation, but you should inform your doctor.    How can these problems be prevented?  The key to prevention is good blood sugar control all day every day.  Inadequate blood sugar control is the most common way patients experience these problems. Reducing, controlling and measuring your daily consumption of sugar or carbohydrates is essential to understanding and managing diabetes.   Physical activity (exercise) is a very good way to help decrease your blood sugars.  Exercise can lower your blood sugar, blood pressure, and cholesterol.  It also reduces your risk for heart disease and stroke, relieves stress, and strengthens your heart, muscles and bones. Physical activity also increases your balance and reduces development of contractures and foot deformities over time. In addition, regular activity helps insulin work better, improves your blood circulation, and keeps your joints flexible.  If you're trying to lose weight, a combination of exercise and wise food choices can help you reach your target weight and maintain it.  Activity and exercise alone can not make up for poor diet choices, eating too much, or eating too many sugars or carbohydrates.  Ask your doctor for help when you are not meeting your blood sugar goals. Changes or increases in medication are powerful tools in reducing your blood sugar.    Know your blood sugar and hemoglobin A1C trend.  Upon first diagnosis or during acute illness, checking your blood sugar 4 times a day can help you understand how your diet, activity, and lifestyle affect your blood sugar.  Monitoring your hemoglobin A1C can help you understand how well you are managing blood sugar over the long run.  Your hemoglobin A1C tells you what your blood sugar averages all day, every day, over the past 90 days.       To experience the lowest risk of complications associated with diabetes such as neuropathy, loss of blood flow, bone or joint infection, charcot arthropathy, or amputation, the American Diabetes Association recommends a target hemoglobin A1C of less than 7.0%, while the American Association of Clinical Endocrinologists' recommendation is 6.5% or less.  Both organizations advise that the goals be individualized based on patient factors such as other health conditions, history of hypoglycemia, education, and life expectancy.  A patients risk of  experiencing complications associated with diabetes is only slightly elevated with a hemoglobin A1C above 6.0.  However, this risk goes up exponentially when the hemoglobin A1C is above 7.5.  The longer the hemoglobin A1C is elevated, the more risk that patient will experience in their lifetime. The damage that occurs to nerves, blood vessels, tendons, bones and body organs, while their hemoglobin A1C is elevated is mostly irreversible and worsens with each additional time period of elevated hemoglobin A1C.     You must understand and manage your disease.  Your health insurance or medical team cannot manage this disease for you.  When you take responsibility for understanding and managing your disease, you can expect to experience fewer problems associated with diabetes in your lifetime.  You will  Also experience a higher quality of life and health and reduced cost of health care.    Diabetic Foot Care Recommendations  The following are recommendations for avoiding serious foot problems or injury    DO'S  1. Be aware of your hemoglobin A1C and continue to follow up with your medical team for adjustments in your lifestyle and medication until your reach your A1C goal.  Keep this below 7.5 to reduce your risk of developing complications associated with diabetes.    2.  Wash your feet with lukewarm water and a mild soap and then dry them thoroughly, especially between the toes.  Gently floss your towel or washcloth between each toe at every bath.  Soaking your feet in water cannot clean dead skin, debris, and bacteria from your feet and is not necessary.   3. Examine your feet daily looking for cuts, corns, blisters, cracks, ect..., especially after wearing new shoes or increased or changed activities.  Make sure to look between your toes.  If you cannot see the bottom of your feet, set a mirror on the floor and hold your foot over it, or ask a family member to examine your feet for you daily.  Contact your doctor  immediately if new problems are noted or if sores are not healing.  4.  Immediately apply moisturizer cream such as Cetaphil to the tops and bottoms of your feet, avoiding areas between the toes.  Apply sunscreen or cover your feet if they will be exposed to extended sunlight.  5.Use clean comfortable shoes.  Socks should not have thick seams or cut off the circulation around the leg.  Break in new shoes slowly and rotate with older shoes until broken in.  Check the inside of your shoes with your hand to look for areas of irritation or objects that may have fallen into your shoes.    6. Keep slippers by the side of your bed for use during the night.  7. Shoes should be fitted by a professional and should not cause areas of irritation.  Check your feet regularly when wearing a new pair of shoes and replace them as needed.  8.  Talk to your doctor about proper exercise.  Exercise and stretching stimulate blood flow to your feet and maintain proper glucose levels.  Use it or lose it!  9.  Monitor your blood glucose level and your hemoglobin A1C.  Notify your doctor immediately if your blood sugar is abnormally high or low.  10.  Cut your nails straight across, but then gently round any sharp edges with a nail file.  If you have neuropathy, peripheral vascular disease or cannot see that well to trim your own toenails, see a medical professional for care.    DONT'S  1.  Do not soak your feet if you have an open sore or your provider has informed you that you have neuropathy or loss of protective threshold.  Use only lukewarm water and always check the temperature with your hand as hot water can easily burn your feet.    2.  Never use a hot water bottle or heating pad on your feet.  Also do not apply hot or cold compresses to your feet.  With decreased sensation, you could burn or freeze your feet.  Do not rest your feet near a heat source such as a heater or heat register.    3.  Do not apply any of these to your  feet:    - over the counter medicine for corns or warts    -  Harsh chemicals like boric acid    -  Do not self-treat corns, cuts, blisters or infections.  Always consult your doctor.   4.  Do not wear sandals, slippers or walk barefoot, especially on harsh surfaces.  5.  If you smoke, stop!!!      Reliable shoe stores: To maximize your experience and provide the best possible fit.  Be sure to show them your foot concerns and tell them Dr. Lennon sent you.      Stores listed in bold have only athletic shoes, and stores that are not bold are mostly casual or variety of shoes    [employer]  [address] [place] [address]  [phone]     [employer] [place]  [address] [place] [address]  [phone]    [employer]  [address] [place] [address]  [phone]    [employer]  [address] [facility] [address]  [phone]    [employer]  [address] [place] [address]  [phone]    [employer]  [address] [place] [address]  [phone]                         [employer] Locations:     [address] [place] [address]   [phone]     [address] [place] [address] [address] [place] [address]   [phone]     [address] [place] [address]   [phone]     [address] [facility] [place] [address]   [phone]     [address] [place] [address]   [phone]     [address] [facility] [place] [address]   [phone]     [address]  [facility] [address]   [place] [address]   [phone]     [address]   [place], [place] [address] [phone]             How to find reasonable shoes          The correct width    Correct Fitting    Correct Length      Foot Distortion    Posture Distortion                          Torsional Rigidity      Grasp behind the heel and underneath the foot and twist      Bad    Excessive torsion/twist in midfoot     Less torsion/twist in  midfoot is better                   Heel Counter Rigidity      Grasp just above   midsole and squeeze      Bad    Soft heel counter      Good    Rigid Heel Counter      Flexion Rigidity      Grasp shoe and bend from forefoot to rearfoot

## 2020-09-22 NOTE — RESULT ENCOUNTER NOTE
Dear Ben, your recent test results are attached.    The cholesterol slight elevated with an LDL of 120.  This is substantially higher than previous values.  The chemistry panel shows a slightly elevated blood sugar of 112 with normal kidney function.  The hemoglobin A1c is 5.6 which is consistent with excellent blood sugar control.  Given your simvastatin allergy, please continue the Zetia and try to maintain a low-fat diet.    You will be contacted with any outstanding results as they are available.  Feel free to contact me via the office or My Chart if you have any questions regarding the above.

## 2020-09-22 NOTE — LETTER
9/22/2020         RE: Ben Ceron  [address] [facility] [address]        Dear Colleague,    Thank you for referring your patient, Ben Ceron, to the [facility]. Please see a copy of my visit note below.    HPI:  Ben Ceron is a 74 year old male who is seen in consultation at the request of Mina Walters DO    Pt presents for eval of:   (Onset, Location, L/R, Character, Treatments, Injury if yes)    XR Left foot today 9/22/2020    DM Type 2     Onset early Aug 2020, plantar base of 2nd and 3rd toe Left foot. No injury. Presents today with well worn shoes.  In fact I can see through the ball of the foot through the sole of the shoe and place my finger through it.  With pressure, sharp, throbbing, stabbing, pain 8    Retired xray tech.    Weight management plan: Patient was referred to their PCP to discuss a diet and exercise plan.     ROS:  10 point ROS neg other than the symptoms noted above in the HPI.    Patient Active Problem List   Diagnosis     Advance Care Planning     Essential hypertension with goal blood pressure less than 140/90     Morbid obesity with BMI of 45.0-49.9, adult (H)     Family history of colon cancer     Rash     LUCAS (obstructive sleep apnea)     Benign non-nodular prostatic hyperplasia     Primary osteoarthritis of left knee     Hyperlipidemia LDL goal <100     Benign prostatic hyperplasia with urinary frequency     Primary osteoarthritis of right knee     Complex tear of medial meniscus of right knee as current injury, initial encounter     S/P arthroscopy of right knee     Strain of right biceps muscle, subsequent encounter       PAST MEDICAL HISTORY:   Past Medical History:   Diagnosis Date     Benign non-nodular prostatic hyperplasia, presence of lower urinary tract symptoms unspecified 5/31/2016     Colonic polyps      Family history of colon cancer 4/4/2016    brother with colonoscopy      Hyperlipidemia LDL goal <100 5/3/2017     Hyperlipidemia  LDL goal <130 12/8/2016     LUCAS (obstructive sleep apnea) 4/4/2016     Rash 4/4/2016        PAST SURGICAL HISTORY:   Past Surgical History:   Procedure Laterality Date     ARTHROSCOPY KNEE WITH MEDIAL MENISCECTOMY  6/6/2014    Procedure: ARTHROSCOPY KNEE WITH MEDIAL MENISCECTOMY;  Surgeon: Ramana Dominguez DO;  [facility]     ARTHROSCOPY KNEE WITH MEDIAL MENISCECTOMY Right 8/3/2018    Procedure: ARTHROSCOPY KNEE WITH MEDIAL MENISCECTOMY;  Right knee arthroscopy with partial meniscectomy;  Surgeon: Ramana Dominguez DO;  Location: [facility]     COLONOSCOPY  11/29/2011    Polypectomy.     COLONOSCOPY N/A 11/12/2014    Procedure: COLONOSCOPY;  Surgeon: Brooks Burris MD;  Location:  GI     HC COLONOSCOPY W BIOPSY  11/24/08     HC COLONOSCOPY W/WO BRUSH/WASH  11/21/2005    Polypectomy.     HC VASECTOMY UNILAT/BILAT W POSTOP SEMEN  1977    Vasectomy        MEDICATIONS:   Current Outpatient Medications:      Acetaminophen (TYLENOL PO), As needed, Disp: , Rfl:      ADVIL 200 MG OR TABS, Reported on 5/2/2017, Disp: 120, Rfl: 0     alcohol swab prep pads, Use to swab area of injection/federica as directed., Disp: 100 each, Rfl: 3     aspirin 81 MG tablet, Take by mouth daily, Disp: 30 tablet, Rfl:      blood glucose (NO BRAND SPECIFIED) test strip, Use to test blood sugar 1 times daily or as directed. To accompany: Blood Glucose Monitor Brands: per insurance., Disp: 100 strip, Rfl: 6     blood glucose calibration (NO BRAND SPECIFIED) solution, To accompany: Blood Glucose Monitor Brands: per insurance., Disp: 1 Bottle, Rfl: 3     blood glucose monitoring (NO BRAND SPECIFIED) meter device kit, Use to test blood sugar 1 times daily or as directed. Preferred blood glucose meter OR supplies to accompany: Blood Glucose Monitor Brands: per insurance., Disp: 1 kit, Rfl: 0     Cholecalciferol (VITAMIN D) 2000 UNITS tablet, Take 1 tablet by mouth daily., Disp: , Rfl:      clobetasol (TEMOVATE) 0.05 % cream, Small  amount to affected areas BID, Disp: 60 g, Rfl: 1     desoximetasone (TOPICORT) 0.25 % cream, use as directed to legs, Disp: 100 g, Rfl: 1     ezetimibe (ZETIA) 10 MG tablet, Take 1 tablet (10 mg) by mouth daily, Disp: 90 tablet, Rfl: 3     finasteride (PROSCAR) 5 MG tablet, Take 1 tablet (5 mg) by mouth daily, Disp: 90 tablet, Rfl: 3     losartan-hydrochlorothiazide (HYZAAR) 100-25 MG tablet, Take 1 tablet by mouth daily, Disp: 90 tablet, Rfl: 3     metFORMIN (GLUCOPHAGE) 500 MG tablet, Take 1 tablet (500 mg) by mouth 2 times daily (with meals), Disp: 180 tablet, Rfl: 3     metoprolol succinate ER (TOPROL-XL) 100 MG 24 hr tablet, Take 1 tablet (100 mg) by mouth daily, Disp: 90 tablet, Rfl: 3     NIFEdipine ER (ADALAT CC) 60 MG 24 hr tablet, Take 1 tablet (60 mg) by mouth daily, Disp: 90 tablet, Rfl: 3     order for DME, Equipment being ordered: CPAP and related hardware, mask and tubing as well heated humidity equipment., Disp: 1 Units, Rfl: 0     thin (NO BRAND SPECIFIED) lancets, Use with lanceting device. To accompany: Blood Glucose Monitor Brands: per insurance., Disp: 100 each, Rfl: 6     VITAMIN E 400 UNIT OR CAPS, , Disp: , Rfl: 0     ALLERGIES:    Allergies   Allergen Reactions     Norvasc [Amlodipine] Muscle Pain (Myalgia)     Simvastatin Rash     Rash and edema        SOCIAL HISTORY:   Social History     Socioeconomic History     Marital status:      Spouse name: Bia     Number of children: 2     Years of education: 16     Highest education level: Not on file   Occupational History     Occupation: Radiologic tech     [employer]er: [employer]   Social Needs     Financial resource strain: Not on file     Food insecurity     Worry: Not on file     Inability: Not on file     Transportation needs     Medical: Not on file     Non-medical: Not on file   Tobacco Use     Smoking status: Never Smoker     Smokeless tobacco: Never Used   Substance and Sexual Activity     Alcohol use: Yes      "Alcohol/week: 0.0 standard drinks     Comment: occasional     Drug use: No     Sexual activity: Yes     Partners: Female   Lifestyle     Physical activity     Days per week: Not on file     Minutes per session: Not on file     Stress: Not on file   Relationships     Social connections     Talks on phone: Not on file     Gets together: Not on file     Attends Shinto service: Not on file     Active member of club or organization: Not on file     Attends meetings of clubs or organizations: Not on file     Relationship status: Not on file     Intimate partner violence     Fear of current or ex partner: Not on file     Emotionally abused: Not on file     Physically abused: Not on file     Forced sexual activity: Not on file   Other Topics Concern      Service No     Blood Transfusions No     Caffeine Concern No     Occupational Exposure Yes     Comment: Radiation,     Hobby Hazards Yes     Comment: hunting     Sleep Concern Yes     Comment: CPAP     Stress Concern Yes     Comment: job     Weight Concern Yes     Comment: would like to lose      Special Diet No     Back Care No     Exercise No     Bike Helmet No     Seat Belt Yes     Self-Exams Yes     Comment: occassional     Parent/sibling w/ CABG, MI or angioplasty before 65F 55M? No   Social History Narrative     Not on file        FAMILY HISTORY:   Family History   Problem Relation Age of Onset     Cancer Mother         Lung Cancer     Cancer - colorectal Father         Stomach Cancer     Cancer Brother         EXAM:Vitals: /64 (BP Location: Left arm, Patient Position: Sitting, Cuff Size: Adult Large)   Ht 1.727 m (5' 8\")   Wt 133.1 kg (293 lb 6.4 oz)   BMI 44.61 kg/m    BMI= Body mass index is 44.61 kg/m .    General appearance: Patient is alert and fully cooperative with history & exam.  No sign of distress is noted during the visit.     Psychiatric: Affect is pleasant & appropriate.  Patient appears motivated to improve health.     Respiratory: " Breathing is regular & unlabored while sitting.     HEENT: Hearing is intact to spoken word.  Speech is clear.  No gross evidence of visual impairment that would impact ambulation.     Vascular: DP & PT pulses are intact & regular bilaterally.  No significant edema or varicosities noted.  CFT and skin temperature is normal to both lower extremities.     Neurologic: Lower extremity sensation is intact to light touch.  No evidence of weakness or contracture in the lower extremities.  No evidence of neuropathy.    Dermatologic: Skin is intact to both lower extremities with adequate texture, turgor and tone about the integument.  No paronychia or evidence of soft tissue infection is noted.     Musculoskeletal: Patient is ambulatory without assistive device or brace.  Pain is noted with firm palpation about the plantar plate of the second greater than third metatarsal phalangeal joint left foot.  No palpable edema is noted localized to this joint but there is mild generalized edema bilateral lower extremities consistent with venous stasis.  No pain with palpation along the proximal metatarsal.  No erythema or heat.  Full range of motion of the ankle subtalar midtarsal metatarsal phalangeal joints.  Negative drawer maneuver of the metatarsal phalangeal joint.  Manual muscle strength was 5/5 to all 4 quadrants.  He is not limping during gait but describes discomfort about the left second metatarsal phalangeal joint during gait.  Mild contracture of the lesser digits.  This is mostly reducible.    Radiographs: 3 views left foot demonstrate no acute cortical reaction or joint deviation.  There is mild contracture of the lesser digits.    Hemoglobin A1C (%)   Date Value   09/15/2020 5.6   05/08/2019 5.8 (H)   04/27/2018 5.8   05/03/2017 6.6 (H)     Creatinine (mg/dL)   Date Value   09/15/2020 0.98   05/08/2019 0.83   04/27/2018 1.01   05/02/2017 0.87   12/08/2016 0.87   12/03/2015 0.91     ASSESSMENT:       ICD-10-CM    1.  Capsulitis of foot, left  M77.9 ORTHOTICS REFERRAL        PLAN:  Reviewed patient's chart in [facility].      9/22/2020   Obtained interpreted radiographs  Discussed he is developed overuse of the second metatarsal phalangeal joint.  This may be associated with his hammertoe and mild valgus and obesity however his shoes are completely broken down and I can stick my fingers through the sole of the shoe.  Recommended he obtain new shoes.  Written instructions dispensed on how to do select appropriate shoe gear to protect his feet including his overall foot shape and his weight at 293 pounds.  Also place an order for custom molded orthotics to increase load through the arch and reduce load through the heel and forefoot.  Can also utilize a cut out under the second metatarsal head to protect this area more aggressively if needed.  Also discussed utilizing anti-inflammatories if necessary but he feels he would like to change shoes and attempt orthotics first.  All questions were answered.  Recommended he follow-up again in about 3 to 4 weeks to confirm this is resolved.  Discussed risk of chronic changes happening to this joint over time.    Vimal Lennon DPM      Again, thank you for allowing me to participate in the care of your patient.        Sincerely,        Vimal Lennon DPM

## 2020-09-22 NOTE — PROGRESS NOTES
HPI:  Ben Ceron is a 74 year old male who is seen in consultation at the request of Mina Walters DO    Pt presents for eval of:   (Onset, Location, L/R, Character, Treatments, Injury if yes)    XR Left foot today 9/22/2020    DM Type 2     Onset early Aug 2020, plantar base of 2nd and 3rd toe Left foot. No injury. Presents today with well worn shoes.  In fact I can see through the ball of the foot through the sole of the shoe and place my finger through it.  With pressure, sharp, throbbing, stabbing, pain 8    Retired xray tech.    Weight management plan: Patient was referred to their PCP to discuss a diet and exercise plan.     ROS:  10 point ROS neg other than the symptoms noted above in the HPI.    Patient Active Problem List   Diagnosis     Advance Care Planning     Essential hypertension with goal blood pressure less than 140/90     Morbid obesity with BMI of 45.0-49.9, adult (H)     Family history of colon cancer     Rash     LUCAS (obstructive sleep apnea)     Benign non-nodular prostatic hyperplasia     Primary osteoarthritis of left knee     Hyperlipidemia LDL goal <100     Benign prostatic hyperplasia with urinary frequency     Primary osteoarthritis of right knee     Complex tear of medial meniscus of right knee as current injury, initial encounter     S/P arthroscopy of right knee     Strain of right biceps muscle, subsequent encounter       PAST MEDICAL HISTORY:   Past Medical History:   Diagnosis Date     Benign non-nodular prostatic hyperplasia, presence of lower urinary tract symptoms unspecified 5/31/2016     Colonic polyps      Family history of colon cancer 4/4/2016    brother with colonoscopy      Hyperlipidemia LDL goal <100 5/3/2017     Hyperlipidemia LDL goal <130 12/8/2016     LUCAS (obstructive sleep apnea) 4/4/2016     Rash 4/4/2016        PAST SURGICAL HISTORY:   Past Surgical History:   Procedure Laterality Date     ARTHROSCOPY KNEE WITH MEDIAL MENISCECTOMY  6/6/2014     Procedure: ARTHROSCOPY KNEE WITH MEDIAL MENISCECTOMY;  Surgeon: Ramana Dominguez DO;  [facility]     ARTHROSCOPY KNEE WITH MEDIAL MENISCECTOMY Right 8/3/2018    Procedure: ARTHROSCOPY KNEE WITH MEDIAL MENISCECTOMY;  Right knee arthroscopy with partial meniscectomy;  Surgeon: Ramana Dominguez DO;  [facility]     COLONOSCOPY  11/29/2011    Polypectomy.     COLONOSCOPY N/A 11/12/2014    Procedure: COLONOSCOPY;  Surgeon: Brooks Burris MD;  Location: [facility] GI     HC COLONOSCOPY W BIOPSY  11/24/08     HC COLONOSCOPY W/WO BRUSH/WASH  11/21/2005    Polypectomy.     HC VASECTOMY UNILAT/BILAT W POSTOP SEMEN  1977    Vasectomy        MEDICATIONS:   Current Outpatient Medications:      Acetaminophen (TYLENOL PO), As needed, Disp: , Rfl:      ADVIL 200 MG OR TABS, Reported on 5/2/2017, Disp: 120, Rfl: 0     alcohol swab prep pads, Use to swab area of injection/federica as directed., Disp: 100 each, Rfl: 3     aspirin 81 MG tablet, Take by mouth daily, Disp: 30 tablet, Rfl:      blood glucose (NO BRAND SPECIFIED) test strip, Use to test blood sugar 1 times daily or as directed. To accompany: Blood Glucose Monitor Brands: per insurance., Disp: 100 strip, Rfl: 6     blood glucose calibration (NO BRAND SPECIFIED) solution, To accompany: Blood Glucose Monitor Brands: per insurance., Disp: 1 Bottle, Rfl: 3     blood glucose monitoring (NO BRAND SPECIFIED) meter device kit, Use to test blood sugar 1 times daily or as directed. Preferred blood glucose meter OR supplies to accompany: Blood Glucose Monitor Brands: per insurance., Disp: 1 kit, Rfl: 0     Cholecalciferol (VITAMIN D) 2000 UNITS tablet, Take 1 tablet by mouth daily., Disp: , Rfl:      clobetasol (TEMOVATE) 0.05 % cream, Small amount to affected areas BID, Disp: 60 g, Rfl: 1     desoximetasone (TOPICORT) 0.25 % cream, use as directed to legs, Disp: 100 g, Rfl: 1     ezetimibe (ZETIA) 10 MG tablet, Take 1 tablet (10 mg) by mouth daily, Disp: 90  tablet, Rfl: 3     finasteride (PROSCAR) 5 MG tablet, Take 1 tablet (5 mg) by mouth daily, Disp: 90 tablet, Rfl: 3     losartan-hydrochlorothiazide (HYZAAR) 100-25 MG tablet, Take 1 tablet by mouth daily, Disp: 90 tablet, Rfl: 3     metFORMIN (GLUCOPHAGE) 500 MG tablet, Take 1 tablet (500 mg) by mouth 2 times daily (with meals), Disp: 180 tablet, Rfl: 3     metoprolol succinate ER (TOPROL-XL) 100 MG 24 hr tablet, Take 1 tablet (100 mg) by mouth daily, Disp: 90 tablet, Rfl: 3     NIFEdipine ER (ADALAT CC) 60 MG 24 hr tablet, Take 1 tablet (60 mg) by mouth daily, Disp: 90 tablet, Rfl: 3     order for DME, Equipment being ordered: CPAP and related hardware, mask and tubing as well heated humidity equipment., Disp: 1 Units, Rfl: 0     thin (NO BRAND SPECIFIED) lancets, Use with lanceting device. To accompany: Blood Glucose Monitor Brands: per insurance., Disp: 100 each, Rfl: 6     VITAMIN E 400 UNIT OR CAPS, , Disp: , Rfl: 0     ALLERGIES:    Allergies   Allergen Reactions     Norvasc [Amlodipine] Muscle Pain (Myalgia)     Simvastatin Rash     Rash and edema        SOCIAL HISTORY:   Social History     Socioeconomic History     Marital status:      Spouse name: Bia     Number of children: 2     Years of education: 16     Highest education level: Not on file   Occupational History     Occupation: Radiologic tech     Employer: [employer]   Social Needs     Financial resource strain: Not on file     Food insecurity     Worry: Not on file     Inability: Not on file     Transportation needs     Medical: Not on file     Non-medical: Not on file   Tobacco Use     Smoking status: Never Smoker     Smokeless tobacco: Never Used   Substance and Sexual Activity     Alcohol use: Yes     Alcohol/week: 0.0 standard drinks     Comment: occasional     Drug use: No     Sexual activity: Yes     Partners: Female   Lifestyle     Physical activity     Days per week: Not on file     Minutes per session: Not on file      "Stress: Not on file   Relationships     Social connections     Talks on phone: Not on file     Gets together: Not on file     Attends Zoroastrianism service: Not on file     Active member of club or organization: Not on file     Attends meetings of clubs or organizations: Not on file     Relationship status: Not on file     Intimate partner violence     Fear of current or ex partner: Not on file     Emotionally abused: Not on file     Physically abused: Not on file     Forced sexual activity: Not on file   Other Topics Concern      Service No     Blood Transfusions No     Caffeine Concern No     Occupational Exposure Yes     Comment: Radiation,     Hobby Hazards Yes     Comment: hunting     Sleep Concern Yes     Comment: CPAP     Stress Concern Yes     Comment: job     Weight Concern Yes     Comment: would like to lose      Special Diet No     Back Care No     Exercise No     Bike Helmet No     Seat Belt Yes     Self-Exams Yes     Comment: occassional     Parent/sibling w/ CABG, MI or angioplasty before 65F 55M? No   Social History Narrative     Not on file        FAMILY HISTORY:   Family History   Problem Relation Age of Onset     Cancer Mother         Lung Cancer     Cancer - colorectal Father         Stomach Cancer     Cancer Brother         EXAM:Vitals: /64 (BP Location: Left arm, Patient Position: Sitting, Cuff Size: Adult Large)   Ht 1.727 m (5' 8\")   Wt 133.1 kg (293 lb 6.4 oz)   BMI 44.61 kg/m    BMI= Body mass index is 44.61 kg/m .    General appearance: Patient is alert and fully cooperative with history & exam.  No sign of distress is noted during the visit.     Psychiatric: Affect is pleasant & appropriate.  Patient appears motivated to improve health.     Respiratory: Breathing is regular & unlabored while sitting.     HEENT: Hearing is intact to spoken word.  Speech is clear.  No gross evidence of visual impairment that would impact ambulation.     Vascular: DP & PT pulses are intact & " regular bilaterally.  No significant edema or varicosities noted.  CFT and skin temperature is normal to both lower extremities.     Neurologic: Lower extremity sensation is intact to light touch.  No evidence of weakness or contracture in the lower extremities.  No evidence of neuropathy.    Dermatologic: Skin is intact to both lower extremities with adequate texture, turgor and tone about the integument.  No paronychia or evidence of soft tissue infection is noted.     Musculoskeletal: Patient is ambulatory without assistive device or brace.  Pain is noted with firm palpation about the plantar plate of the second greater than third metatarsal phalangeal joint left foot.  No palpable edema is noted localized to this joint but there is mild generalized edema bilateral lower extremities consistent with venous stasis.  No pain with palpation along the proximal metatarsal.  No erythema or heat.  Full range of motion of the ankle subtalar midtarsal metatarsal phalangeal joints.  Negative drawer maneuver of the metatarsal phalangeal joint.  Manual muscle strength was 5/5 to all 4 quadrants.  He is not limping during gait but describes discomfort about the left second metatarsal phalangeal joint during gait.  Mild contracture of the lesser digits.  This is mostly reducible.    Radiographs: 3 views left foot demonstrate no acute cortical reaction or joint deviation.  There is mild contracture of the lesser digits.    Hemoglobin A1C (%)   Date Value   09/15/2020 5.6   05/08/2019 5.8 (H)   04/27/2018 5.8   05/03/2017 6.6 (H)     Creatinine (mg/dL)   Date Value   09/15/2020 0.98   05/08/2019 0.83   04/27/2018 1.01   05/02/2017 0.87   12/08/2016 0.87   12/03/2015 0.91     ASSESSMENT:       ICD-10-CM    1. Capsulitis of foot, left  M77.9 ORTHOTICS REFERRAL        PLAN:  Reviewed patient's chart in [facility].      9/22/2020   Obtained interpreted radiographs  Discussed he is developed overuse of the second metatarsal phalangeal  joint.  This may be associated with his hammertoe and mild valgus and obesity however his shoes are completely broken down and I can stick my fingers through the sole of the shoe.  Recommended he obtain new shoes.  Written instructions dispensed on how to do select appropriate shoe gear to protect his feet including his overall foot shape and his weight at 293 pounds.  Also place an order for custom molded orthotics to increase load through the arch and reduce load through the heel and forefoot.  Can also utilize a cut out under the second metatarsal head to protect this area more aggressively if needed.  Also discussed utilizing anti-inflammatories if necessary but he feels he would like to change shoes and attempt orthotics first.  All questions were answered.  Recommended he follow-up again in about 3 to 4 weeks to confirm this is resolved.  Discussed risk of chronic changes happening to this joint over time.    Vimal Lennon DPM

## 2020-09-25 ENCOUNTER — ANESTHESIA EVENT (OUTPATIENT)
Dept: GASTROENTEROLOGY | Facility: CLINIC | Age: 75
End: 2020-09-25

## 2020-09-25 ENCOUNTER — SURGERY (OUTPATIENT)
Age: 75
End: 2020-09-25
Payer: COMMERCIAL

## 2020-09-25 ENCOUNTER — HOSPITAL ENCOUNTER (OUTPATIENT)
Facility: CLINIC | Age: 75
Discharge: HOME | End: 2020-09-25
Attending: INTERNAL MEDICINE | Admitting: INTERNAL MEDICINE
Payer: MEDICARE

## 2020-09-25 ENCOUNTER — ANESTHESIA (OUTPATIENT)
Dept: GASTROENTEROLOGY | Facility: CLINIC | Age: 75
End: 2020-09-25

## 2020-09-25 VITALS
TEMPERATURE: 98 F | RESPIRATION RATE: 16 BRPM | HEART RATE: 62 BPM | DIASTOLIC BLOOD PRESSURE: 92 MMHG | SYSTOLIC BLOOD PRESSURE: 171 MMHG | OXYGEN SATURATION: 96 %

## 2020-09-25 LAB
COLONOSCOPY: NORMAL
GLUCOSE BLDC GLUCOMTR-MCNC: 102 MG/DL (ref 70–99)

## 2020-09-25 PROCEDURE — 45380 COLONOSCOPY AND BIOPSY: CPT | Mod: XU,PT | Performed by: INTERNAL MEDICINE

## 2020-09-25 PROCEDURE — 88305 TISSUE EXAM BY PATHOLOGIST: CPT | Performed by: INTERNAL MEDICINE

## 2020-09-25 PROCEDURE — 40000296 ZZH STATISTIC ENDO RECOVERY CLASS 1:2 FIRST HOUR: Performed by: INTERNAL MEDICINE

## 2020-09-25 PROCEDURE — 45380 COLONOSCOPY AND BIOPSY: CPT | Mod: 59 | Performed by: INTERNAL MEDICINE

## 2020-09-25 PROCEDURE — 45385 COLONOSCOPY W/LESION REMOVAL: CPT | Performed by: INTERNAL MEDICINE

## 2020-09-25 PROCEDURE — G0500 MOD SEDAT ENDO SERVICE >5YRS: HCPCS | Performed by: INTERNAL MEDICINE

## 2020-09-25 PROCEDURE — 99153 MOD SED SAME PHYS/QHP EA: CPT | Performed by: INTERNAL MEDICINE

## 2020-09-25 PROCEDURE — 25000128 H RX IP 250 OP 636: Performed by: INTERNAL MEDICINE

## 2020-09-25 PROCEDURE — 45385 COLONOSCOPY W/LESION REMOVAL: CPT | Mod: PT | Performed by: INTERNAL MEDICINE

## 2020-09-25 PROCEDURE — 88305 TISSUE EXAM BY PATHOLOGIST: CPT | Mod: 26 | Performed by: INTERNAL MEDICINE

## 2020-09-25 PROCEDURE — 82962 GLUCOSE BLOOD TEST: CPT

## 2020-09-25 RX ORDER — FENTANYL CITRATE 50 UG/ML
INJECTION, SOLUTION INTRAMUSCULAR; INTRAVENOUS PRN
Status: DISCONTINUED | OUTPATIENT
Start: 2020-09-25 | End: 2020-09-25 | Stop reason: HOSPADM

## 2020-09-25 RX ORDER — ONDANSETRON 2 MG/ML
4 INJECTION INTRAMUSCULAR; INTRAVENOUS
Status: DISCONTINUED | OUTPATIENT
Start: 2020-09-25 | End: 2020-09-25 | Stop reason: HOSPADM

## 2020-09-25 RX ORDER — ONDANSETRON 4 MG/1
4 TABLET, ORALLY DISINTEGRATING ORAL EVERY 6 HOURS PRN
Status: DISCONTINUED | OUTPATIENT
Start: 2020-09-25 | End: 2020-09-25 | Stop reason: HOSPADM

## 2020-09-25 RX ORDER — FLUMAZENIL 0.1 MG/ML
0.2 INJECTION, SOLUTION INTRAVENOUS
Status: DISCONTINUED | OUTPATIENT
Start: 2020-09-25 | End: 2020-09-25 | Stop reason: HOSPADM

## 2020-09-25 RX ORDER — ONDANSETRON 2 MG/ML
4 INJECTION INTRAMUSCULAR; INTRAVENOUS EVERY 6 HOURS PRN
Status: DISCONTINUED | OUTPATIENT
Start: 2020-09-25 | End: 2020-09-25 | Stop reason: HOSPADM

## 2020-09-25 RX ORDER — NALOXONE HYDROCHLORIDE 0.4 MG/ML
.1-.4 INJECTION, SOLUTION INTRAMUSCULAR; INTRAVENOUS; SUBCUTANEOUS
Status: DISCONTINUED | OUTPATIENT
Start: 2020-09-25 | End: 2020-09-25 | Stop reason: HOSPADM

## 2020-09-25 RX ORDER — LIDOCAINE 40 MG/G
CREAM TOPICAL
Status: DISCONTINUED | OUTPATIENT
Start: 2020-09-25 | End: 2020-09-25 | Stop reason: HOSPADM

## 2020-09-25 RX ADMIN — MIDAZOLAM 1 MG: 1 INJECTION INTRAMUSCULAR; INTRAVENOUS at 09:14

## 2020-09-25 RX ADMIN — MIDAZOLAM 1 MG: 1 INJECTION INTRAMUSCULAR; INTRAVENOUS at 09:13

## 2020-09-25 RX ADMIN — MIDAZOLAM 2 MG: 1 INJECTION INTRAMUSCULAR; INTRAVENOUS at 09:12

## 2020-09-25 RX ADMIN — FENTANYL CITRATE 50 MCG: 50 INJECTION, SOLUTION INTRAMUSCULAR; INTRAVENOUS at 09:12

## 2020-09-25 RX ADMIN — MIDAZOLAM 1 MG: 1 INJECTION INTRAMUSCULAR; INTRAVENOUS at 09:15

## 2020-09-25 RX ADMIN — FENTANYL CITRATE 50 MCG: 50 INJECTION, SOLUTION INTRAMUSCULAR; INTRAVENOUS at 09:15

## 2020-09-25 RX ADMIN — MIDAZOLAM 1 MG: 1 INJECTION INTRAMUSCULAR; INTRAVENOUS at 09:18

## 2020-09-25 NOTE — LETTER
[address] [address]           Tel [phone]     Ben Ceron  [address] [facility] [address]      September 29, 2020    Dear Ben,    This letter is written to inform you of the results of your recent colonoscopy.  Your polyps were all adenomas, as you have had in the past.    Adenomatous polyps are entirely benign (non-cancerous); however, patients who have developed these polyps are at an increased risk for developing additional polyps in the future. If these are not eventually removed, there is a risk of developing colon cancer. We will advise more frequent examinations with you because of the risk associated with this type of polyp.    Given these findings, I recommend that you undergo a repeat colonoscopy in 3 year(s) for surveillance. We will enter you into a recall system so you receive a reminder closer to the time that you are due for repeat examination.     Please remember that this recommendation is made with the understanding that you are not experiencing persistent changes in bowel function, bleeding per rectum, and/or significant abdominal pain. If you experience these symptoms, please contact your primary care provider for a further evaluation.     If you have any questions or concerns about the results of your colonoscopy or the appropriate follow-up, please contact my assistant at [phone].    Sincerely,        Brooks Burris MD  Gastroenterology

## 2020-09-25 NOTE — CONSULTS
[facility] GI Pre-Procedure Physical Assessment    Ben Ceron MRN# [id number]   Age: 74 year old YOB: 1945      Date of Surgery: 9/25/2020  Location [facility]      Date of Exam 9/25/2020 Facility (Same day)       Home clinic: [facility]  Primary care provider: Mina Walters         Active problem list:   Patient Active Problem List   Diagnosis     Advance Care Planning     Essential hypertension with goal blood pressure less than 140/90     Morbid obesity with BMI of 45.0-49.9, adult (H)     Family history of colon cancer     Rash     LUCAS (obstructive sleep apnea)     Benign non-nodular prostatic hyperplasia     Primary osteoarthritis of left knee     Hyperlipidemia LDL goal <100     Benign prostatic hyperplasia with urinary frequency     Primary osteoarthritis of right knee     Complex tear of medial meniscus of right knee as current injury, initial encounter     S/P arthroscopy of right knee     Strain of right biceps muscle, subsequent encounter            Medications (include herbals and vitamins):   Any Plavix use in the last 7 days?  No     Current Facility-Administered Medications   Medication     lidocaine (LMX4) kit     lidocaine 1 % 0.1-1 mL     ondansetron (ZOFRAN) injection 4 mg     sodium chloride (PF) 0.9% PF flush 3 mL     sodium chloride (PF) 0.9% PF flush 3 mL             Allergies:      Allergies   Allergen Reactions     Norvasc [Amlodipine] Muscle Pain (Myalgia)     Simvastatin Rash     Rash and edema     Allergy to Latex?  No  Allergy to tape?    No          Social History:     Social History     Tobacco Use     Smoking status: Never Smoker     Smokeless tobacco: Never Used   Substance Use Topics     Alcohol use: Yes     Alcohol/week: 0.0 standard drinks     Comment: occasional            Physical Exam:   All vitals have been reviewed  Blood pressure (!) 154/81, pulse 64, temperature 98  F (36.7  C), temperature source Oral,  resp. rate 18.  Airway assessment:   Patient is able to open mouth wide  Patient is able to stick out tongue      Lungs:   No increased work of breathing, good air exchange, clear to auscultation bilaterally, no crackles or wheezing      Cardiovascular:   Normal apical impulse, regular rate and rhythm, normal S1 and S2, no S3 or S4, and no murmur noted           Lab / Radiology Results:   All laboratory data reviewed          Assessment:   Appropriately NPO  Chief complaint or anatomic assessment of involved area: history of polyps         Plan:   Moderate (conscious) sedation     Patient's active problems diagnostically and therapeutically optimized for the planned procedure  Risks, benefits, alternatives to sedation and blood explained and consent obtained  Risks, benefits, alternatives to procedure explained and consent obtained  Orders and progress notes are in the chart  Discharge from Phase 1 and / or Phase 2 recovery when patient meets criteria    I have reviewed the history and physical, lab finding(s), diagnostic data, medicaitons, and the plan for sedation.  I have determined this patient to be an appropriate candidate for the planned sedation / procedure and have reassessed the patient immediately prior to sedation / procedure.    I have personally and medically directed the administration of medications used.    Brooks Burris MD

## 2020-09-25 NOTE — DISCHARGE INSTRUCTIONS
[facility]    Home Care Following Endoscopy    Dr. Burris        Activity:    You have just undergone an endoscopic procedure usually performed with conscious sedation.  Do not work or operate machinery (including a car) for at least 12 hours.      I encourage you to walk and attempt to pass this air as soon as possible.    Diet:    Return to the diet you were on before your procedure but eat lightly for the first 12-24 hours.    Drink plenty of water.    Resume any regular medications unless otherwise advised by your physician.  Please begin any new medication prescribed as a result of your procedure as directed by your physician.     If you had any biopsy or polyp removed please refrain from aspirin or aspirin products for 2 days.  If on Coumadin please restart as instructed by your physician.   Pain:    You may take Tylenol as needed for pain.  Expected Recovery:    You can expect some mild abdominal fullness and/or discomfort due to the air used to inflate your intestinal tract.     Call Your Physician if You Have:    After Colonoscopy:  o Worsening persisting abdominal pain which is worse with activity.  o Fevers (>101 degrees F), chills or shakes.  o Passage of continued blood with bowel movements.   Any questions or concerns about your recovery, please call [phone] or after hours [phone] Nurse Advice Line.    Follow-up Care:    You should receive a call or letter with your results within 1 week. Please call if you have not received a notification of your results.  If asked to return to clinic please make an appointment 1 week after your procedure.  Call [phone].

## 2020-09-29 LAB — COPATH REPORT: NORMAL

## 2020-10-20 ENCOUNTER — OFFICE VISIT (OUTPATIENT)
Dept: AUDIOLOGY | Facility: CLINIC | Age: 75
End: 2020-10-20
Payer: COMMERCIAL

## 2020-10-20 DIAGNOSIS — H93.13 TINNITUS OF BOTH EARS: ICD-10-CM

## 2020-10-20 DIAGNOSIS — H90.3 SENSORINEURAL HEARING LOSS, BILATERAL: Primary | ICD-10-CM

## 2020-10-20 PROCEDURE — 92550 TYMPANOMETRY & REFLEX THRESH: CPT | Performed by: AUDIOLOGIST

## 2020-10-20 PROCEDURE — 92557 COMPREHENSIVE HEARING TEST: CPT | Performed by: AUDIOLOGIST

## 2020-10-20 PROCEDURE — 99207 PR NO CHARGE LOS: CPT | Performed by: AUDIOLOGIST

## 2020-10-20 NOTE — PROGRESS NOTES
AUDIOLOGY REPORT    SUBJECTIVE:  Ben Ceron is a 74 year old male who was seen in at [facility] for audiologic evaluation, referred by MALISSA Walters D.O.  The patient reports a gradual worsening of hearing, left ear worse than right for years.  He observes difficulty hearing the TV at his wife's preferred volume, needs more repetition, and struggles to understand in groups. The patient denies problems with balance and dizziness, he has not fallen.  The patient notes difficulty with communication in a variety of listening situations.  They were unaccompanied today}.    OBJECTIVE:  Fall Risk Screen:  1. Have you fallen two or more times in the past year? No  2. Have you fallen and had an injury in the past year? No    Timed Up and Go Score (in seconds): not tested    Otoscopic exam indicates cerumen right ear, tympanic membrane partially visible, clear canal and visible landmarks left.     Pure Tone Thresholds assessed using conventional audiometry with good  reliability from 250-8000 Hz bilaterally using insert earphones     RIGHT:  mild sloping to severe sensorineural hearing loss    LEFT:    mild sloping to severe sensorineural hearing loss    Tympanogram:    RIGHT: normal eardrum mobility    LEFT:   normal eardrum mobility    Reflexes (reported by stimulus ear):  RIGHT: Ipsilateral is present at normal levels  RIGHT: Contralateral is present at normal levels  LEFT:   Ipsilateral is present at normal levels  LEFT:   Contralateral is present at normal levels      Speech Reception Threshold:    RIGHT: 25 dB HL    LEFT:   35 dB HL    Word Recognition Score:     RIGHT: 96% at 65 dB HL using NU-6 recorded word list.    LEFT:   84% at 80 dB HL using NU-6 recorded word list.      ASSESSMENT:     ICD-10-CM    1. Sensorineural hearing loss, bilateral  H90.3 Cmprhn Audiometry Thrshld Eval & Speech Recog (67669)     Tymps / Reflex   (87473)   2. Tinnitus of both ears  H93.13 Cmprhn Audiometry  Thrshld Eval & Speech Recog (35005)     Tymps / Reflex   (08814)     Today s results were discussed with the patient in detail.     PLAN:    Patient was counseled regarding hearing loss and impact on communication.  Patient is a good candidate for amplification at this time. Handout on good communication strategies, and hearing aid use was given to patient. It is recommended that the patient schedule an ENT consult with Dr. Fontanez regarding asymmetric hearing loss.  Please call this clinic with questions regarding these results or recommendations.        Alisia Camacho.  [place] Licensed Audiologist #[id number]

## 2021-05-05 DIAGNOSIS — I10 BENIGN ESSENTIAL HYPERTENSION: Primary | ICD-10-CM

## 2021-05-05 RX ORDER — LOSARTAN POTASSIUM AND HYDROCHLOROTHIAZIDE 25; 100 MG/1; MG/1
TABLET ORAL
Qty: 90 TABLET | Refills: 0 | Status: SHIPPED | OUTPATIENT
Start: 2021-05-05 | End: 2021-08-06

## 2021-05-05 NOTE — TELEPHONE ENCOUNTER
Routing refill request to provider for review/approval because:  Labs out of range:  BP    CORI SinhaN, RN  [facility]

## 2021-06-29 DIAGNOSIS — R35.0 BENIGN PROSTATIC HYPERPLASIA WITH URINARY FREQUENCY: ICD-10-CM

## 2021-06-29 DIAGNOSIS — N40.1 BENIGN PROSTATIC HYPERPLASIA WITH URINARY FREQUENCY: ICD-10-CM

## 2021-06-29 RX ORDER — FINASTERIDE 5 MG/1
1 TABLET, FILM COATED ORAL DAILY
Qty: 90 TABLET | Refills: 1 | Status: SHIPPED | OUTPATIENT
Start: 2021-06-29 | End: 2021-08-06

## 2021-06-29 NOTE — TELEPHONE ENCOUNTER
Prescription approved per [facility] Refill Protocol.    Natalie Ruvalcaba RN

## 2021-08-06 ENCOUNTER — OFFICE VISIT (OUTPATIENT)
Dept: INTERNAL MEDICINE | Facility: CLINIC | Age: 76
End: 2021-08-06
Payer: COMMERCIAL

## 2021-08-06 VITALS
RESPIRATION RATE: 18 BRPM | HEART RATE: 70 BPM | TEMPERATURE: 97.9 F | SYSTOLIC BLOOD PRESSURE: 126 MMHG | BODY MASS INDEX: 44.85 KG/M2 | DIASTOLIC BLOOD PRESSURE: 74 MMHG | WEIGHT: 295 LBS | OXYGEN SATURATION: 96 %

## 2021-08-06 DIAGNOSIS — Z00.00 MEDICARE ANNUAL WELLNESS VISIT, SUBSEQUENT: ICD-10-CM

## 2021-08-06 DIAGNOSIS — Z12.5 SCREENING FOR PROSTATE CANCER: Primary | ICD-10-CM

## 2021-08-06 DIAGNOSIS — R35.0 BENIGN PROSTATIC HYPERPLASIA WITH URINARY FREQUENCY: ICD-10-CM

## 2021-08-06 DIAGNOSIS — N40.1 BENIGN PROSTATIC HYPERPLASIA WITH URINARY FREQUENCY: ICD-10-CM

## 2021-08-06 DIAGNOSIS — E11.9 CONTROLLED TYPE 2 DIABETES MELLITUS WITHOUT COMPLICATION, WITHOUT LONG-TERM CURRENT USE OF INSULIN (H): ICD-10-CM

## 2021-08-06 DIAGNOSIS — R53.83 FATIGUE, UNSPECIFIED TYPE: ICD-10-CM

## 2021-08-06 DIAGNOSIS — E78.5 HYPERLIPIDEMIA LDL GOAL <130: ICD-10-CM

## 2021-08-06 DIAGNOSIS — I10 BENIGN ESSENTIAL HYPERTENSION: ICD-10-CM

## 2021-08-06 LAB
ALBUMIN UR-MCNC: NEGATIVE MG/DL
ANION GAP SERPL CALCULATED.3IONS-SCNC: 2 MMOL/L (ref 3–14)
APPEARANCE UR: CLEAR
BASOPHILS # BLD AUTO: 0.1 10E3/UL (ref 0–0.2)
BASOPHILS NFR BLD AUTO: 1 %
BILIRUB UR QL STRIP: NEGATIVE
BUN SERPL-MCNC: 19 MG/DL (ref 7–30)
CALCIUM SERPL-MCNC: 8.7 MG/DL (ref 8.5–10.1)
CHLORIDE BLD-SCNC: 105 MMOL/L (ref 94–109)
CO2 SERPL-SCNC: 32 MMOL/L (ref 20–32)
COLOR UR AUTO: YELLOW
CREAT SERPL-MCNC: 1.01 MG/DL (ref 0.66–1.25)
EOSINOPHIL # BLD AUTO: 0.4 10E3/UL (ref 0–0.7)
EOSINOPHIL NFR BLD AUTO: 5 %
ERYTHROCYTE [DISTWIDTH] IN BLOOD BY AUTOMATED COUNT: 12.1 % (ref 10–15)
GFR SERPL CREATININE-BSD FRML MDRD: 73 ML/MIN/1.73M2
GLUCOSE BLD-MCNC: 113 MG/DL (ref 70–99)
GLUCOSE UR STRIP-MCNC: NEGATIVE MG/DL
HBA1C MFR BLD: 6 % (ref 0–5.6)
HCT VFR BLD AUTO: 41.3 % (ref 40–53)
HGB BLD-MCNC: 14.3 G/DL (ref 13.3–17.7)
HGB UR QL STRIP: NEGATIVE
IMM GRANULOCYTES # BLD: 0 10E3/UL
IMM GRANULOCYTES NFR BLD: 0 %
KETONES UR STRIP-MCNC: NEGATIVE MG/DL
LEUKOCYTE ESTERASE UR QL STRIP: NEGATIVE
LYMPHOCYTES # BLD AUTO: 2.1 10E3/UL (ref 0.8–5.3)
LYMPHOCYTES NFR BLD AUTO: 24 %
MCH RBC QN AUTO: 31.7 PG (ref 26.5–33)
MCHC RBC AUTO-ENTMCNC: 34.6 G/DL (ref 31.5–36.5)
MCV RBC AUTO: 92 FL (ref 78–100)
MONOCYTES # BLD AUTO: 0.5 10E3/UL (ref 0–1.3)
MONOCYTES NFR BLD AUTO: 6 %
NEUTROPHILS # BLD AUTO: 5.5 10E3/UL (ref 1.6–8.3)
NEUTROPHILS NFR BLD AUTO: 64 %
NITRATE UR QL: NEGATIVE
NRBC # BLD AUTO: 0 10E3/UL
NRBC BLD AUTO-RTO: 0 /100
PH UR STRIP: 5 [PH] (ref 5–7)
PLATELET # BLD AUTO: 251 10E3/UL (ref 150–450)
POTASSIUM BLD-SCNC: 4.2 MMOL/L (ref 3.4–5.3)
PSA SERPL-MCNC: 1.41 UG/L (ref 0–4)
RBC # BLD AUTO: 4.51 10E6/UL (ref 4.4–5.9)
SODIUM SERPL-SCNC: 139 MMOL/L (ref 133–144)
SP GR UR STRIP: 1.02 (ref 1–1.03)
UROBILINOGEN UR STRIP-MCNC: NORMAL MG/DL
WBC # BLD AUTO: 8.5 10E3/UL (ref 4–11)

## 2021-08-06 PROCEDURE — 36415 COLL VENOUS BLD VENIPUNCTURE: CPT | Performed by: INTERNAL MEDICINE

## 2021-08-06 PROCEDURE — 83036 HEMOGLOBIN GLYCOSYLATED A1C: CPT | Performed by: INTERNAL MEDICINE

## 2021-08-06 PROCEDURE — 99397 PER PM REEVAL EST PAT 65+ YR: CPT | Performed by: INTERNAL MEDICINE

## 2021-08-06 PROCEDURE — G0103 PSA SCREENING: HCPCS | Performed by: INTERNAL MEDICINE

## 2021-08-06 PROCEDURE — 80048 BASIC METABOLIC PNL TOTAL CA: CPT | Performed by: INTERNAL MEDICINE

## 2021-08-06 PROCEDURE — 85025 COMPLETE CBC W/AUTO DIFF WBC: CPT | Performed by: INTERNAL MEDICINE

## 2021-08-06 PROCEDURE — 99213 OFFICE O/P EST LOW 20 MIN: CPT | Mod: 25 | Performed by: INTERNAL MEDICINE

## 2021-08-06 PROCEDURE — 81003 URINALYSIS AUTO W/O SCOPE: CPT | Performed by: INTERNAL MEDICINE

## 2021-08-06 RX ORDER — EZETIMIBE 10 MG/1
10 TABLET ORAL DAILY
Qty: 90 TABLET | Refills: 1 | Status: SHIPPED | OUTPATIENT
Start: 2021-08-06 | End: 2021-08-16

## 2021-08-06 RX ORDER — FINASTERIDE 5 MG/1
1 TABLET, FILM COATED ORAL DAILY
Qty: 90 TABLET | Refills: 1 | Status: SHIPPED | OUTPATIENT
Start: 2021-08-06 | End: 2022-06-23

## 2021-08-06 RX ORDER — METOPROLOL SUCCINATE 100 MG/1
100 TABLET, EXTENDED RELEASE ORAL DAILY
Qty: 90 TABLET | Refills: 1 | Status: SHIPPED | OUTPATIENT
Start: 2021-08-06 | End: 2022-02-07

## 2021-08-06 RX ORDER — LOSARTAN POTASSIUM AND HYDROCHLOROTHIAZIDE 25; 100 MG/1; MG/1
1 TABLET ORAL DAILY
Qty: 90 TABLET | Refills: 1 | Status: SHIPPED | OUTPATIENT
Start: 2021-08-06 | End: 2022-02-07

## 2021-08-06 ASSESSMENT — ACTIVITIES OF DAILY LIVING (ADL): CURRENT_FUNCTION: NO ASSISTANCE NEEDED

## 2021-08-06 ASSESSMENT — PAIN SCALES - GENERAL: PAINLEVEL: NO PAIN (0)

## 2021-08-06 NOTE — PROGRESS NOTES
"SUBJECTIVE:   Ben Ceron is a 75 year old male who presents for Preventive Visit.    Patient has been advised of split billing requirements and indicates understanding: Yes   Are you in the first 12 months of your Medicare coverage?  No    Healthy Habits:     In general, how would you rate your overall health?  Good    Frequency of exercise:  2-3 days/week    Duration of exercise:  Greater than 60 minutes    Do you usually eat at least 4 servings of fruit and vegetables a day, include whole grains    & fiber and avoid regularly eating high fat or \"junk\" foods?  Yes    Taking medications regularly:  Yes    Barriers to taking medications:  None    Medication side effects:  None    Ability to successfully perform activities of daily living:  No assistance needed    Home Safety:  No safety concerns identified    Hearing Impairment:  Difficulty understanding soft or whispered speech    In the past 6 months, have you been bothered by leaking of urine?  No    In general, how would you rate your overall mental or emotional health?  Good      PHQ-2 Total Score: 0    Additional concerns today:  No    Do you feel safe in your environment? Yes    Have you ever done Advance Care Planning? (For example, a Health Directive, POLST, or a discussion with a medical provider or your loved ones about your wishes): Yes, advance care planning is on file.       Fall risk  Fallen 2 or more times in the past year?: No  Any fall with injury in the past year?: No    Cognitive Screening   1) Repeat 3 items (Leader, Season, Table)    2) Clock draw: NORMAL  3) 3 item recall: Recalls 3 objects  Results: 3 items recalled: COGNITIVE IMPAIRMENT LESS LIKELY    Mini-CogTM Copyright ROBERTA Maria. Licensed by the author for use in [facility]; reprinted with permission ([email or web address]@.[facility]). All rights reserved.      Do you have sleep apnea, excessive snoring or daytime drowsiness?: yes    Reviewed and updated as needed this visit by clinical " staff  Tobacco  Allergies  Meds   Med Hx  Surg Hx  Fam Hx  Soc Hx        Reviewed and updated as needed this visit by Provider                Social History     Tobacco Use     Smoking status: Never Smoker     Smokeless tobacco: Never Used   Substance Use Topics     Alcohol use: Yes     Alcohol/week: 0.0 standard drinks     Comment: occasional     If you drink alcohol do you typically have >3 drinks per day or >7 drinks per week? No    Alcohol Use 12/8/2016   Prescreen: >3 drinks/day or >7 drinks/week? The patient does not drink >3 drinks per day nor >7 drinks per week.           -------------------------------------  Patient complains of occasional fatigue.  Notes that he sleeps well at night but does have some daytime fatigue which is often associated with exertion.    Current providers sharing in care for this patient include:   Patient Care Team:  Mina Walters DO as PCP - General (Internal Medicine)  Mina Walters DO as Assigned PCP  Vimal Lennon DPM as Assigned Musculoskeletal Provider    The following health maintenance items are reviewed in Epic and correct as of today:  Health Maintenance Due   Topic Date Due     ANNUAL REVIEW OF HM ORDERS  Never done     EYE EXAM  Never done     ZOSTER IMMUNIZATION (2 of 3) 10/31/2013     A1C  03/15/2021     Lab work is in process  Labs reviewed in EPIC  BP Readings from Last 3 Encounters:   08/06/21 126/74   09/25/20 (!) 171/92   09/22/20 118/64    Wt Readings from Last 3 Encounters:   08/06/21 133.8 kg (295 lb)   09/22/20 133.1 kg (293 lb 6.4 oz)   09/15/20 133.1 kg (293 lb 6.4 oz)                  Patient Active Problem List   Diagnosis     Advance Care Planning     Essential hypertension with goal blood pressure less than 140/90     Morbid obesity with BMI of 45.0-49.9, adult (H)     Family history of colon cancer     Rash     LUCAS (obstructive sleep apnea)     Benign non-nodular prostatic hyperplasia     Primary osteoarthritis  of left knee     Hyperlipidemia LDL goal <100     Benign prostatic hyperplasia with urinary frequency     Primary osteoarthritis of right knee     Complex tear of medial meniscus of right knee as current injury, initial encounter     S/P arthroscopy of right knee     Strain of right biceps muscle, subsequent encounter     Past Surgical History:   Procedure Laterality Date     ARTHROSCOPY KNEE WITH MEDIAL MENISCECTOMY  6/6/2014    Procedure: ARTHROSCOPY KNEE WITH MEDIAL MENISCECTOMY;  Surgeon: Ramana Dominguez DO;  Location: [facility] OR     ARTHROSCOPY KNEE WITH MEDIAL MENISCECTOMY Right 8/3/2018    Procedure: ARTHROSCOPY KNEE WITH MEDIAL MENISCECTOMY;  Right knee arthroscopy with partial meniscectomy;  Surgeon: Ramana Dominguez DO;  Location: [facility] OR     COLONOSCOPY  11/29/2011    Polypectomy.     COLONOSCOPY N/A 11/12/2014    Procedure: COLONOSCOPY;  Surgeon: Brooks Burris MD;  Location: [facility] GI     COLONOSCOPY N/A 9/25/2020    Procedure: Colonoscopy with  polypectomy by biopsy;  Surgeon: Brooks Burris MD;  Location:  GI     HC VASECTOMY UNILAT/BILAT W POSTOP SEMEN  1977    Vasectomy     ZZHC COLONOSCOPY W BIOPSY  11/24/08     ZZHC COLONOSCOPY W/WO BRUSH/WASH  11/21/2005    Polypectomy.       Social History     Tobacco Use     Smoking status: Never Smoker     Smokeless tobacco: Never Used   Substance Use Topics     Alcohol use: Yes     Alcohol/week: 0.0 standard drinks     Comment: occasional     Family History   Problem Relation Age of Onset     Cancer Mother         Lung Cancer     Cancer - colorectal Father         Stomach Cancer     Cancer Brother          Current Outpatient Medications   Medication Sig Dispense Refill     Acetaminophen (TYLENOL PO) As needed       ADVIL 200 MG OR TABS Reported on 5/2/2017 120 0     aspirin 81 MG tablet Take by mouth daily 30 tablet      Cholecalciferol (VITAMIN D) 2000 UNITS tablet Take 1 tablet by mouth daily.       clobetasol (TEMOVATE) 0.05 % cream Small  amount to affected areas BID 60 g 1     desoximetasone (TOPICORT) 0.25 % cream use as directed to legs 100 g 1     ezetimibe (ZETIA) 10 MG tablet Take 1 tablet (10 mg) by mouth daily 90 tablet 1     finasteride (PROSCAR) 5 MG tablet Take 1 tablet (5 mg) by mouth daily 90 tablet 1     losartan-hydrochlorothiazide (HYZAAR) 100-25 MG tablet Take 1 tablet by mouth daily 90 tablet 1     metFORMIN (GLUCOPHAGE) 500 MG tablet Take 1 tablet (500 mg) by mouth 2 times daily (with meals) 180 tablet 1     metoprolol succinate ER (TOPROL-XL) 100 MG 24 hr tablet Take 1 tablet (100 mg) by mouth daily 90 tablet 1     NIFEdipine ER (ADALAT CC) 60 MG 24 hr tablet Take 1 tablet (60 mg) by mouth daily 90 tablet 3     order for DME Equipment being ordered: CPAP and related hardware, mask and tubing as well heated humidity equipment. 1 Units 0     thin (NO BRAND SPECIFIED) lancets Use with lanceting device. To accompany: Blood Glucose Monitor Brands: per insurance. 100 each 6     VITAMIN E 400 UNIT OR CAPS   0     alcohol swab prep pads Use to swab area of injection/federica as directed. (Patient not taking: Reported on 8/6/2021) 100 each 3     blood glucose (NO BRAND SPECIFIED) test strip Use to test blood sugar 1 times daily or as directed. To accompany: Blood Glucose Monitor Brands: per insurance. (Patient not taking: Reported on 8/6/2021) 100 strip 6     blood glucose calibration (NO BRAND SPECIFIED) solution To accompany: Blood Glucose Monitor Brands: per insurance. (Patient not taking: Reported on 8/6/2021) 1 Bottle 3     blood glucose monitoring (NO BRAND SPECIFIED) meter device kit Use to test blood sugar 1 times daily or as directed. Preferred blood glucose meter OR supplies to accompany: Blood Glucose Monitor Brands: per insurance. (Patient not taking: Reported on 8/6/2021) 1 kit 0     Allergies   Allergen Reactions     Norvasc [Amlodipine] Muscle Pain (Myalgia)     Simvastatin Rash     Rash and edema             Review of  "Systems  CONSTITUTIONAL: NEGATIVE for fever, chills, change in weight.  Occasional fatigue noted.    INTEGUMENTARY/SKIN: NEGATIVE for worrisome rashes, moles or lesions  EYES: NEGATIVE for vision changes or irritation  ENT/MOUTH: NEGATIVE for ear, mouth and throat problems  RESP: NEGATIVE for significant cough or SOB  BREAST: NEGATIVE for masses, tenderness or discharge  CV: NEGATIVE for chest pain, palpitations or peripheral edema  GI: NEGATIVE for nausea, abdominal pain, heartburn, or change in bowel habits  : NEGATIVE for frequency, dysuria, or hematuria  MUSCULOSKELETAL: NEGATIVE for significant arthralgias or myalgia  NEURO: NEGATIVE for weakness, dizziness or paresthesias  ENDOCRINE: NEGATIVE for temperature intolerance, skin/hair changes  HEME: NEGATIVE for bleeding problems  PSYCHIATRIC: NEGATIVE for changes in mood or affect    OBJECTIVE:   /74 (BP Location: Right arm, Patient Position: Sitting, Cuff Size: Adult Large)   Pulse 70   Temp 97.9  F (36.6  C) (Temporal)   Resp 18   Wt 133.8 kg (295 lb)   SpO2 96%   BMI 44.85 kg/m   Estimated body mass index is 44.85 kg/m  as calculated from the following:    Height as of 9/22/20: 1.727 m (5' 8\").    Weight as of this encounter: 133.8 kg (295 lb).  Physical Exam  GENERAL: healthy, alert and no distress  EYES: Eyes grossly normal to inspection, PERRL and conjunctivae and sclerae normal  HENT: ear canals and TM's normal, nose and mouth without ulcers or lesions  NECK: no adenopathy, no asymmetry, masses, or scars and thyroid normal to palpation  RESP: lungs clear to auscultation - no rales, rhonchi or wheezes  CV: regular rate and rhythm, normal S1 S2, no S3 or S4, no murmur, click or rub, no peripheral edema and peripheral pulses strong  ABDOMEN: soft, nontender, no hepatosplenomegaly, no masses and bowel sounds normal  MS: no gross musculoskeletal defects noted, no edema  SKIN: no suspicious lesions or rashes  NEURO: Normal strength and tone, " mentation intact and speech normal  PSYCH: mentation appears normal, affect normal/bright    Diagnostic Test Results:  Labs reviewed in Epic  Results for orders placed or performed in visit on 08/06/21 (from the past 24 hour(s))   Hemoglobin A1c   Result Value Ref Range    Hemoglobin A1C 6.0 (H) 0.0 - 5.6 %   PSA, screen   Result Value Ref Range    Prostate Specific Antigen Screen 1.41 0.00 - 4.00 ug/L   UA Macro with Reflex to Micro and Culture - lab collect    Specimen: Urine, Clean Catch   Result Value Ref Range    Color Urine Yellow Colorless, Straw, Light Yellow, Yellow    Appearance Urine Clear Clear    Glucose Urine Negative Negative mg/dL    Bilirubin Urine Negative Negative    Ketones Urine Negative Negative mg/dL    Specific Gravity Urine 1.025 1.003 - 1.035    Blood Urine Negative Negative    pH Urine 5.0 5.0 - 7.0    Protein Albumin Urine Negative Negative mg/dL    Urobilinogen Urine Normal Normal, 2.0 mg/dL    Nitrite Urine Negative Negative    Leukocyte Esterase Urine Negative Negative    Narrative    Microscopic not indicated   Basic metabolic panel  (Ca, Cl, CO2, Creat, Gluc, K, Na, BUN)   Result Value Ref Range    Sodium 139 133 - 144 mmol/L    Potassium 4.2 3.4 - 5.3 mmol/L    Chloride 105 94 - 109 mmol/L    Carbon Dioxide (CO2) 32 20 - 32 mmol/L    Anion Gap 2 (L) 3 - 14 mmol/L    Urea Nitrogen 19 7 - 30 mg/dL    Creatinine 1.01 0.66 - 1.25 mg/dL    Calcium 8.7 8.5 - 10.1 mg/dL    Glucose 113 (H) 70 - 99 mg/dL    GFR Estimate 73 >60 mL/min/1.73m2   CBC with platelets and differential    Narrative    The following orders were created for panel order CBC with platelets and differential.  Procedure                               Abnormality         Status                     ---------                               -----------         ------                     CBC with platelets and d...[[id number]]                      Final result                 Please view results for these tests on the individual  orders.   CBC with platelets and differential   Result Value Ref Range    WBC Count 8.5 4.0 - 11.0 10e3/uL    RBC Count 4.51 4.40 - 5.90 10e6/uL    Hemoglobin 14.3 13.3 - 17.7 g/dL    Hematocrit 41.3 40.0 - 53.0 %    MCV 92 78 - 100 fL    MCH 31.7 26.5 - 33.0 pg    MCHC 34.6 31.5 - 36.5 g/dL    RDW 12.1 10.0 - 15.0 %    Platelet Count 251 150 - 450 10e3/uL    % Neutrophils 64 %    % Lymphocytes 24 %    % Monocytes 6 %    % Eosinophils 5 %    % Basophils 1 %    % Immature Granulocytes 0 %    NRBCs per 100 WBC 0 <1 /100    Absolute Neutrophils 5.5 1.6 - 8.3 10e3/uL    Absolute Lymphocytes 2.1 0.8 - 5.3 10e3/uL    Absolute Monocytes 0.5 0.0 - 1.3 10e3/uL    Absolute Eosinophils 0.4 0.0 - 0.7 10e3/uL    Absolute Basophils 0.1 0.0 - 0.2 10e3/uL    Absolute Immature Granulocytes 0.0 <=0.0 10e3/uL    Absolute NRBCs 0.0 10e3/uL       ASSESSMENT / PLAN:       ICD-10-CM    1. Screening for prostate cancer  Z12.5 PSA, screen     PSA, screen   2. Medicare annual wellness visit, subsequent  Z00.00    3. Controlled type 2 diabetes mellitus without complication, without long-term current use of insulin (H)  E11.9 metFORMIN (GLUCOPHAGE) 500 MG tablet     Basic metabolic panel  (Ca, Cl, CO2, Creat, Gluc, K, Na, BUN)     Hemoglobin A1c     Hemoglobin A1c     Basic metabolic panel  (Ca, Cl, CO2, Creat, Gluc, K, Na, BUN)   4. Hyperlipidemia LDL goal <130  E78.5 ezetimibe (ZETIA) 10 MG tablet   5. Benign prostatic hyperplasia with urinary frequency  N40.1 finasteride (PROSCAR) 5 MG tablet    R35.0    6. Benign essential hypertension  I10 losartan-hydrochlorothiazide (HYZAAR) 100-25 MG tablet     metoprolol succinate ER (TOPROL-XL) 100 MG 24 hr tablet     NIFEdipine ER (ADALAT CC) 60 MG 24 hr tablet     UA Macro with Reflex to Micro and Culture - lab collect     UA Macro with Reflex to Micro and Culture - lab collect   7. Fatigue, unspecified type  R53.83 CBC with platelets and differential     CBC with platelets and differential  "      Patient has been advised of split billing requirements and indicates understanding: Yes  COUNSELING:  Reviewed preventive health counseling, as reflected in patient instructions       Consider AAA screening for ages 65-75 and smoking history       Regular exercise       Healthy diet/nutrition       Vision screening       Hearing screening       Colon cancer screening       Prostate cancer screening    Estimated body mass index is 44.85 kg/m  as calculated from the following:    Height as of 9/22/20: 1.727 m (5' 8\").    Weight as of this encounter: 133.8 kg (295 lb).    Weight management plan: Discussed healthy diet and exercise guidelines    He reports that he has never smoked. He has never used smokeless tobacco.      Appropriate preventive services were discussed with this patient, including applicable screening as appropriate for cardiovascular disease, diabetes, osteopenia/osteoporosis, and glaucoma.  As appropriate for age/gender, discussed screening for colorectal cancer, prostate cancer, breast cancer, and cervical cancer. Checklist reviewing preventive services available has been given to the patient.    Reviewed patients plan of care and provided an AVS. The Basic Care Plan (routine screening as documented in Health Maintenance) for Ben meets the Care Plan requirement. This Care Plan has been established and reviewed with the Patient and spouse.    Counseling Resources:  ATP IV Guidelines  Pooled Cohorts Equation Calculator  Breast Cancer Risk Calculator  Breast Cancer: Medication to Reduce Risk  FRAX Risk Assessment  ICSI Preventive Guidelines  Dietary Guidelines for Americans, 2010  USDA's MyPlate  ASA Prophylaxis  Lung CA Screening    Mina Walters DO  [facility]    Identified Health Risks:  "

## 2021-08-06 NOTE — LETTER
August 19, 2021      Ben Ceron  [address] [facility] [address]        Dear ,    We are writing to inform you of your test results.    The prostate-specific antigen is consistent with a low risk of prostate cancer.   The chemistry panel shows a minimally elevated blood sugar 113 and normal kidney function.   Urinary analysis is negative.   The hemoglobin A1c is 6.0 suggesting excellent blood sugar control.   The blood cell count is normal without evidence of anemia or leukemia.     You will be contacted with any outstanding results as they are available.   Feel free to contact me via the office or My Chart if you have any questions regarding the above.     snehal Walters DO    Resulted Orders   Hemoglobin A1c   Result Value Ref Range    Hemoglobin A1C 6.0 (H) 0.0 - 5.6 %      Comment:      Normal <5.7%   Prediabetes 5.7-6.4%    Diabetes 6.5% or higher     Note: Adopted from ADA consensus guidelines.   PSA, screen   Result Value Ref Range    Prostate Specific Antigen Screen 1.41 0.00 - 4.00 ug/L   UA Macro with Reflex to Micro and Culture - lab collect   Result Value Ref Range    Color Urine Yellow Colorless, Straw, Light Yellow, Yellow    Appearance Urine Clear Clear    Glucose Urine Negative Negative mg/dL    Bilirubin Urine Negative Negative    Ketones Urine Negative Negative mg/dL    Specific Gravity Urine 1.025 1.003 - 1.035    Blood Urine Negative Negative    pH Urine 5.0 5.0 - 7.0    Protein Albumin Urine Negative Negative mg/dL    Urobilinogen Urine Normal Normal, 2.0 mg/dL    Nitrite Urine Negative Negative    Leukocyte Esterase Urine Negative Negative    Narrative    Microscopic not indicated   Basic metabolic panel  (Ca, Cl, CO2, Creat, Gluc, K, Na, BUN)   Result Value Ref Range    Sodium 139 133 - 144 mmol/L    Potassium 4.2 3.4 - 5.3 mmol/L    Chloride 105 94 - 109 mmol/L    Carbon Dioxide (CO2) 32 20 - 32 mmol/L    Anion Gap 2 (L) 3 - 14 mmol/L    Urea Nitrogen 19 7 - 30 mg/dL     Creatinine 1.01 0.66 - 1.25 mg/dL    Calcium 8.7 8.5 - 10.1 mg/dL    Glucose 113 (H) 70 - 99 mg/dL    GFR Estimate 73 >60 mL/min/1.73m2      Comment:      As of July 11, 2021, eGFR is calculated by the CKD-EPI creatinine equation, without race adjustment. eGFR can be influenced by muscle mass, exercise, and diet. The reported eGFR is an estimation only and is only applicable if the renal function is stable.   CBC with platelets and differential   Result Value Ref Range    WBC Count 8.5 4.0 - 11.0 10e3/uL    RBC Count 4.51 4.40 - 5.90 10e6/uL    Hemoglobin 14.3 13.3 - 17.7 g/dL    Hematocrit 41.3 40.0 - 53.0 %    MCV 92 78 - 100 fL    MCH 31.7 26.5 - 33.0 pg    MCHC 34.6 31.5 - 36.5 g/dL    RDW 12.1 10.0 - 15.0 %    Platelet Count 251 150 - 450 10e3/uL    % Neutrophils 64 %    % Lymphocytes 24 %    % Monocytes 6 %    % Eosinophils 5 %    % Basophils 1 %    % Immature Granulocytes 0 %    NRBCs per 100 WBC 0 <1 /100    Absolute Neutrophils 5.5 1.6 - 8.3 10e3/uL    Absolute Lymphocytes 2.1 0.8 - 5.3 10e3/uL    Absolute Monocytes 0.5 0.0 - 1.3 10e3/uL    Absolute Eosinophils 0.4 0.0 - 0.7 10e3/uL    Absolute Basophils 0.1 0.0 - 0.2 10e3/uL    Absolute Immature Granulocytes 0.0 <=0.0 10e3/uL    Absolute NRBCs 0.0 10e3/uL       If you have any questions or concerns, please call the clinic at the number listed above.

## 2021-08-13 ENCOUNTER — TELEPHONE (OUTPATIENT)
Dept: INTERNAL MEDICINE | Facility: CLINIC | Age: 76
End: 2021-08-13

## 2021-08-13 DIAGNOSIS — E78.5 HYPERLIPIDEMIA LDL GOAL <130: ICD-10-CM

## 2021-08-13 NOTE — TELEPHONE ENCOUNTER
Central Prior Authorization Team   Phone: [phone]    PA Initiation    Medication: ezetimibe (ZETIA) 10 MG tablet  Insurance Company: [employer] - Phone [phone] Fax [phone]  Pharmacy Filling the Rx: [facility] [address] [place] [address] - [address]  Filling Pharmacy Phone: [phone]  Filling Pharmacy Fax:    Start Date: 8/13/2021

## 2021-08-13 NOTE — TELEPHONE ENCOUNTER
Prior Authorization Retail Medication Request- [id number]    Medication/Dose: ezetimibe (ZETIA) 10 MG tablet  ICD code (if different than what is on RX):    Previously Tried and Failed:    Rationale:      Insurance Name:  [facility]  Insurance ID:  [id number]       Pharmacy Information (if different than what is on RX)  Name:    Phone:

## 2021-08-16 RX ORDER — EZETIMIBE 10 MG/1
10 TABLET ORAL DAILY
Qty: 90 TABLET | Refills: 1 | Status: SHIPPED | OUTPATIENT
Start: 2021-08-16 | End: 2023-09-20

## 2021-08-16 NOTE — TELEPHONE ENCOUNTER
Pharmacy is running medication for brand name. Please provide clinical rationale as to why patient can not take generic.  If brand name is not neccessary please contact pharmacy and tell them to change mediation to generic version.

## 2022-02-04 DIAGNOSIS — E11.9 CONTROLLED TYPE 2 DIABETES MELLITUS WITHOUT COMPLICATION, WITHOUT LONG-TERM CURRENT USE OF INSULIN (H): ICD-10-CM

## 2022-02-04 DIAGNOSIS — I10 BENIGN ESSENTIAL HYPERTENSION: ICD-10-CM

## 2022-02-04 NOTE — TELEPHONE ENCOUNTER
Reason for Call:  Medication or medication refill:    Do you use a [facility]?  Name of the pharmacy and phone number for the current request: [facility] [place]    Name of the medication requested: losartan 100mg, metformin 500mg, metoprolol 100mg    Other request: almost out has enough until 2/7/22    Can we leave a detailed message on this number? YES    Phone number patient can be reached at: Home number on file [phone] (home)    Best Time: anytime      Call taken on 2/4/2022 at 1:59 PM by Farzaneh Martinez

## 2022-02-07 RX ORDER — METOPROLOL SUCCINATE 100 MG/1
100 TABLET, EXTENDED RELEASE ORAL DAILY
Qty: 90 TABLET | Refills: 1 | Status: SHIPPED | OUTPATIENT
Start: 2022-02-07 | End: 2022-08-15

## 2022-02-07 RX ORDER — LOSARTAN POTASSIUM AND HYDROCHLOROTHIAZIDE 25; 100 MG/1; MG/1
1 TABLET ORAL DAILY
Qty: 90 TABLET | Refills: 1 | Status: SHIPPED | OUTPATIENT
Start: 2022-02-07 | End: 2022-08-15

## 2022-02-07 NOTE — TELEPHONE ENCOUNTER
Toprol Xl  Prescription approved per [facility] Refill Protocol.    Glucophage  Medication is being filled for 1 time refill only due to:  Patient needs labs A1C.    Hyzaar  Prescription approved per [facility] Refill Protocol.    Juanita Parish RN

## 2022-05-02 DIAGNOSIS — I10 BENIGN ESSENTIAL HYPERTENSION: ICD-10-CM

## 2022-05-02 DIAGNOSIS — E11.9 CONTROLLED TYPE 2 DIABETES MELLITUS WITHOUT COMPLICATION, WITHOUT LONG-TERM CURRENT USE OF INSULIN (H): ICD-10-CM

## 2022-05-04 DIAGNOSIS — I10 BENIGN ESSENTIAL HYPERTENSION: ICD-10-CM

## 2022-05-04 NOTE — TELEPHONE ENCOUNTER
Adalat denied, refill current  Sent to this pharmacy on 8/6/2021 for 90 tablets and 3 refills    Metformin  Routing refill request to provider for review/approval because:  Labs not current:  A1C    Juanita Parish RN

## 2022-05-06 NOTE — TELEPHONE ENCOUNTER
Prescription was sent 8/6/2021 for #90 with 3 refills.  Pharmacy notified via E-Prescribe refusal.     CORI SinhaN, RN  [facility]

## 2022-06-21 DIAGNOSIS — N40.1 BENIGN PROSTATIC HYPERPLASIA WITH URINARY FREQUENCY: ICD-10-CM

## 2022-06-21 DIAGNOSIS — R35.0 BENIGN PROSTATIC HYPERPLASIA WITH URINARY FREQUENCY: ICD-10-CM

## 2022-06-23 DIAGNOSIS — R35.0 BENIGN PROSTATIC HYPERPLASIA WITH URINARY FREQUENCY: ICD-10-CM

## 2022-06-23 DIAGNOSIS — N40.1 BENIGN PROSTATIC HYPERPLASIA WITH URINARY FREQUENCY: ICD-10-CM

## 2022-06-23 RX ORDER — FINASTERIDE 5 MG/1
1 TABLET, FILM COATED ORAL DAILY
Qty: 90 TABLET | Refills: 0 | Status: SHIPPED | OUTPATIENT
Start: 2022-06-23 | End: 2022-09-23

## 2022-06-24 RX ORDER — FINASTERIDE 5 MG/1
TABLET, FILM COATED ORAL
Qty: 90 TABLET | Refills: 0 | OUTPATIENT
Start: 2022-06-24

## 2022-08-10 ENCOUNTER — OFFICE VISIT (OUTPATIENT)
Dept: INTERNAL MEDICINE | Facility: CLINIC | Age: 77
End: 2022-08-10
Payer: COMMERCIAL

## 2022-08-10 VITALS
HEIGHT: 68 IN | RESPIRATION RATE: 18 BRPM | DIASTOLIC BLOOD PRESSURE: 84 MMHG | BODY MASS INDEX: 45.62 KG/M2 | TEMPERATURE: 97.6 F | SYSTOLIC BLOOD PRESSURE: 132 MMHG | HEART RATE: 76 BPM | WEIGHT: 301 LBS | OXYGEN SATURATION: 95 %

## 2022-08-10 DIAGNOSIS — E11.69 TYPE 2 DIABETES MELLITUS WITH OTHER SPECIFIED COMPLICATION, WITHOUT LONG-TERM CURRENT USE OF INSULIN (H): ICD-10-CM

## 2022-08-10 DIAGNOSIS — E78.5 HYPERLIPIDEMIA LDL GOAL <130: ICD-10-CM

## 2022-08-10 DIAGNOSIS — Z00.00 MEDICARE ANNUAL WELLNESS VISIT, SUBSEQUENT: ICD-10-CM

## 2022-08-10 DIAGNOSIS — R07.2 PRECORDIAL PAIN: ICD-10-CM

## 2022-08-10 DIAGNOSIS — E66.01 MORBID OBESITY WITH BMI OF 45.0-49.9, ADULT (H): ICD-10-CM

## 2022-08-10 DIAGNOSIS — Z12.5 SCREENING FOR PROSTATE CANCER: ICD-10-CM

## 2022-08-10 DIAGNOSIS — I10 BENIGN ESSENTIAL HYPERTENSION: Primary | ICD-10-CM

## 2022-08-10 LAB
ALBUMIN UR-MCNC: NEGATIVE MG/DL
ANION GAP SERPL CALCULATED.3IONS-SCNC: 5 MMOL/L (ref 3–14)
APPEARANCE UR: CLEAR
BILIRUB UR QL STRIP: NEGATIVE
BUN SERPL-MCNC: 23 MG/DL (ref 7–30)
CALCIUM SERPL-MCNC: 9 MG/DL (ref 8.5–10.1)
CHLORIDE BLD-SCNC: 105 MMOL/L (ref 94–109)
CHOLEST SERPL-MCNC: 186 MG/DL
CO2 SERPL-SCNC: 28 MMOL/L (ref 20–32)
COLOR UR AUTO: YELLOW
CREAT SERPL-MCNC: 0.96 MG/DL (ref 0.66–1.25)
CREAT UR-MCNC: 142 MG/DL
FASTING STATUS PATIENT QL REPORTED: YES
GFR SERPL CREATININE-BSD FRML MDRD: 83 ML/MIN/1.73M2
GLUCOSE BLD-MCNC: 126 MG/DL (ref 70–99)
GLUCOSE UR STRIP-MCNC: NEGATIVE MG/DL
HBA1C MFR BLD: 6.2 % (ref 0–5.6)
HDLC SERPL-MCNC: 49 MG/DL
HGB UR QL STRIP: NEGATIVE
KETONES UR STRIP-MCNC: NEGATIVE MG/DL
LDLC SERPL CALC-MCNC: 98 MG/DL
LEUKOCYTE ESTERASE UR QL STRIP: NEGATIVE
MICROALBUMIN UR-MCNC: 13 MG/L
MICROALBUMIN/CREAT UR: 9.15 MG/G CR (ref 0–17)
NITRATE UR QL: NEGATIVE
NONHDLC SERPL-MCNC: 137 MG/DL
PH UR STRIP: 6 [PH] (ref 5–7)
POTASSIUM BLD-SCNC: 4.1 MMOL/L (ref 3.4–5.3)
PSA SERPL-MCNC: 1.62 UG/L (ref 0–4)
SODIUM SERPL-SCNC: 138 MMOL/L (ref 133–144)
SP GR UR STRIP: 1.02 (ref 1–1.03)
TRIGL SERPL-MCNC: 196 MG/DL
UROBILINOGEN UR STRIP-MCNC: NORMAL MG/DL

## 2022-08-10 PROCEDURE — 99213 OFFICE O/P EST LOW 20 MIN: CPT | Mod: 25 | Performed by: INTERNAL MEDICINE

## 2022-08-10 PROCEDURE — 80048 BASIC METABOLIC PNL TOTAL CA: CPT | Performed by: INTERNAL MEDICINE

## 2022-08-10 PROCEDURE — 82043 UR ALBUMIN QUANTITATIVE: CPT | Performed by: INTERNAL MEDICINE

## 2022-08-10 PROCEDURE — 81003 URINALYSIS AUTO W/O SCOPE: CPT | Performed by: INTERNAL MEDICINE

## 2022-08-10 PROCEDURE — 80061 LIPID PANEL: CPT | Performed by: INTERNAL MEDICINE

## 2022-08-10 PROCEDURE — 83036 HEMOGLOBIN GLYCOSYLATED A1C: CPT | Performed by: INTERNAL MEDICINE

## 2022-08-10 PROCEDURE — G0439 PPPS, SUBSEQ VISIT: HCPCS | Performed by: INTERNAL MEDICINE

## 2022-08-10 PROCEDURE — G0103 PSA SCREENING: HCPCS | Performed by: INTERNAL MEDICINE

## 2022-08-10 PROCEDURE — 36415 COLL VENOUS BLD VENIPUNCTURE: CPT | Performed by: INTERNAL MEDICINE

## 2022-08-10 ASSESSMENT — ENCOUNTER SYMPTOMS
DYSURIA: 0
WEAKNESS: 0
COUGH: 0
CONSTIPATION: 0
EYE PAIN: 0
PARESTHESIAS: 0
DIARRHEA: 0
FEVER: 0
HEADACHES: 0
HEMATURIA: 0
HEARTBURN: 0
NERVOUS/ANXIOUS: 0
DIZZINESS: 0
SORE THROAT: 0
ARTHRALGIAS: 0
JOINT SWELLING: 0
MYALGIAS: 0
FREQUENCY: 0
HEMATOCHEZIA: 0
CHILLS: 0
SHORTNESS OF BREATH: 0
PALPITATIONS: 0
ABDOMINAL PAIN: 0
NAUSEA: 0

## 2022-08-10 ASSESSMENT — ACTIVITIES OF DAILY LIVING (ADL): CURRENT_FUNCTION: NO ASSISTANCE NEEDED

## 2022-08-10 ASSESSMENT — PAIN SCALES - GENERAL: PAINLEVEL: NO PAIN (0)

## 2022-08-10 NOTE — PROGRESS NOTES
"SUBJECTIVE:   Ben Ceron is a 76 year old male who presents for Preventive Visit.    Patient has been advised of split billing requirements and indicates understanding: Yes  Are you in the first 12 months of your Medicare coverage?  No    Healthy Habits:     In general, how would you rate your overall health?  Good    Frequency of exercise:  2-3 days/week    Duration of exercise:  Less than 15 minutes    Do you usually eat at least 4 servings of fruit and vegetables a day, include whole grains    & fiber and avoid regularly eating high fat or \"junk\" foods?  Yes    Taking medications regularly:  Yes    Medication side effects:  Muscle aches    Ability to successfully perform activities of daily living:  No assistance needed    Home Safety:  No safety concerns identified    Hearing Impairment:  Difficulty following a conversation in a noisy restaurant or crowded room, feel that people are mumbling or not speaking clearly, need to ask people to speak up or repeat themselves and difficulty understanding soft or whispered speech    In the past 6 months, have you been bothered by leaking of urine?  No    In general, how would you rate your overall mental or emotional health?  Good      PHQ-2 Total Score: 0    Additional concerns today:  No    Do you feel safe in your environment? Yes    Have you ever done Advance Care Planning? (For example, a Health Directive, POLST, or a discussion with a medical provider or your loved ones about your wishes): No, advance care planning information given to patient to review.  Patient plans to discuss their wishes with loved ones or provider.         Fall risk  Fallen 2 or more times in the past year?: No  Any fall with injury in the past year?: No  click delete button to remove this line now  Cognitive Screening   1) Repeat 3 items (Leader, Season, Table)    2) Clock draw: NORMAL  3) 3 item recall: Recalls 3 objects  Results: 3 items recalled: COGNITIVE IMPAIRMENT LESS " LIKELY    Mini-CogTM Copyright ROBERTA Maria. Licensed by the author for use in [facility]; reprinted with permission ([email or web address]@[email or web address]). All rights reserved.          Reviewed and updated as needed this visit by clinical staff   Tobacco  Allergies  Meds   Med Hx  Surg Hx  Fam Hx  Soc Hx          Reviewed and updated as needed this visit by Provider                   Social History     Tobacco Use     Smoking status: Never Smoker     Smokeless tobacco: Never Used   Substance Use Topics     Alcohol use: Yes     Alcohol/week: 0.0 standard drinks     Comment: occasional     If you drink alcohol do you typically have >3 drinks per day or >7 drinks per week? No    Alcohol Use 8/10/2022   Prescreen: >3 drinks/day or >7 drinks/week? No   Prescreen: >3 drinks/day or >7 drinks/week? -           -------------------------------------    Current providers sharing in care for this patient include:   Patient Care Team:  Mina Walters DO as PCP - General (Internal Medicine)  Mina Walters DO as Assigned PCP    The following health maintenance items are reviewed in Epic and correct as of today:  Health Maintenance Due   Topic Date Due     ANNUAL REVIEW OF HM ORDERS  Never done     EYE EXAM  Never done     ZOSTER IMMUNIZATION (2 of 3) 10/31/2013     LIPID  09/15/2021     MICROALBUMIN  09/15/2021     DIABETIC FOOT EXAM  09/15/2021     A1C  02/06/2022     BMP  08/06/2022     Lab work is in process  BP Readings from Last 3 Encounters:   08/10/22 132/84   08/06/21 126/74   09/25/20 (!) 171/92    Wt Readings from Last 3 Encounters:   08/10/22 136.5 kg (301 lb)   08/06/21 133.8 kg (295 lb)   09/22/20 133.1 kg (293 lb 6.4 oz)                  Patient Active Problem List   Diagnosis     Advance Care Planning     Essential hypertension with goal blood pressure less than 140/90     Morbid obesity with BMI of 45.0-49.9, adult (H)     Family history of colon cancer     Rash     LUCAS (obstructive sleep apnea)      Benign non-nodular prostatic hyperplasia     Primary osteoarthritis of left knee     Hyperlipidemia LDL goal <100     Benign prostatic hyperplasia with urinary frequency     Primary osteoarthritis of right knee     Complex tear of medial meniscus of right knee as current injury, initial encounter     S/P arthroscopy of right knee     Strain of right biceps muscle, subsequent encounter     Type 2 diabetes mellitus with other specified complication, without long-term current use of insulin (H)     Past Surgical History:   Procedure Laterality Date     ARTHROSCOPY KNEE WITH MEDIAL MENISCECTOMY  6/6/2014    Procedure: ARTHROSCOPY KNEE WITH MEDIAL MENISCECTOMY;  Surgeon: Ramana Dominguez DO;  Location: [facility] OR     ARTHROSCOPY KNEE WITH MEDIAL MENISCECTOMY Right 8/3/2018    Procedure: ARTHROSCOPY KNEE WITH MEDIAL MENISCECTOMY;  Right knee arthroscopy with partial meniscectomy;  Surgeon: Ramana Dominguez DO;  Location: [facility] OR     COLONOSCOPY  11/29/2011    Polypectomy.     COLONOSCOPY N/A 11/12/2014    Procedure: COLONOSCOPY;  Surgeon: Brooks Burris MD;  Location:  GI     COLONOSCOPY N/A 9/25/2020    Procedure: Colonoscopy with  polypectomy by biopsy;  Surgeon: Brooks Burris MD;  Location:  GI     HC VASECTOMY UNILAT/BILAT W POSTOP SEMEN  1977    Vasectomy     ZZHC COLONOSCOPY W BIOPSY  11/24/08     ZZHC COLONOSCOPY W/WO BRUSH/WASH  11/21/2005    Polypectomy.       Social History     Tobacco Use     Smoking status: Never Smoker     Smokeless tobacco: Never Used   Substance Use Topics     Alcohol use: Yes     Alcohol/week: 0.0 standard drinks     Comment: occasional     Family History   Problem Relation Age of Onset     Cancer Mother         Lung Cancer     Cancer - colorectal Father         Stomach Cancer     Cancer Brother          Current Outpatient Medications   Medication Sig Dispense Refill     Acetaminophen (TYLENOL PO) As needed       ADVIL 200 MG OR TABS Reported on 5/2/2017 120 0      aspirin 81 MG tablet Take by mouth daily 30 tablet      Cholecalciferol (VITAMIN D) 2000 UNITS tablet Take 1 tablet by mouth daily.       clobetasol (TEMOVATE) 0.05 % cream Small amount to affected areas BID 60 g 1     desoximetasone (TOPICORT) 0.25 % cream use as directed to legs 100 g 1     ezetimibe (ZETIA) 10 MG tablet Take 1 tablet (10 mg) by mouth daily 90 tablet 1     finasteride (PROSCAR) 5 MG tablet Take 1 tablet (5 mg) by mouth daily 90 tablet 0     losartan-hydrochlorothiazide (HYZAAR) 100-25 MG tablet Take 1 tablet by mouth daily 90 tablet 1     metFORMIN (GLUCOPHAGE) 500 MG tablet Take 1 tablet (500 mg) by mouth 2 times daily (with meals) 180 tablet 0     metoprolol succinate ER (TOPROL-XL) 100 MG 24 hr tablet Take 1 tablet (100 mg) by mouth daily 90 tablet 1     NIFEdipine ER (ADALAT CC) 60 MG 24 hr tablet Take 1 tablet (60 mg) by mouth daily 90 tablet 3     order for DME Equipment being ordered: CPAP and related hardware, mask and tubing as well heated humidity equipment. 1 Units 0     thin (NO BRAND SPECIFIED) lancets Use with lanceting device. To accompany: Blood Glucose Monitor Brands: per insurance. 100 each 6     VITAMIN E 400 UNIT OR CAPS   0     alcohol swab prep pads Use to swab area of injection/federica as directed. (Patient not taking: No sig reported) 100 each 3     blood glucose (NO BRAND SPECIFIED) test strip Use to test blood sugar 1 times daily or as directed. To accompany: Blood Glucose Monitor Brands: per insurance. (Patient not taking: No sig reported) 100 strip 6     blood glucose calibration (NO BRAND SPECIFIED) solution To accompany: Blood Glucose Monitor Brands: per insurance. (Patient not taking: No sig reported) 1 Bottle 3     blood glucose monitoring (NO BRAND SPECIFIED) meter device kit Use to test blood sugar 1 times daily or as directed. Preferred blood glucose meter OR supplies to accompany: Blood Glucose Monitor Brands: per insurance. (Patient not taking: No sig  "reported) 1 kit 0     Allergies   Allergen Reactions     Norvasc [Amlodipine] Muscle Pain (Myalgia)     Simvastatin Rash     Rash and edema             Review of Systems   Constitutional: Negative for chills and fever.   HENT: Positive for hearing loss. Negative for congestion, ear pain and sore throat.    Eyes: Negative for pain and visual disturbance.   Respiratory: Negative for cough and shortness of breath.    Cardiovascular: Negative for chest pain, palpitations and peripheral edema.   Gastrointestinal: Negative for abdominal pain, constipation, diarrhea, heartburn, hematochezia and nausea.   Genitourinary: Negative for dysuria, frequency, genital sores, hematuria, impotence, penile discharge and urgency.   Musculoskeletal: Negative for arthralgias, joint swelling and myalgias.   Skin: Negative for rash.   Neurological: Negative for dizziness, weakness, headaches and paresthesias.   Psychiatric/Behavioral: Negative for mood changes. The patient is not nervous/anxious.      Patient describes mild intermittent chest discomfort which is associated with radiation into the left shoulder.  This occurs when he exerts himself or walks extended distances.  It is not reproducible with palpation or position of the shoulder.  Does have a concurrent history of prior coronary artery disease, diabetes, obesity, hyperlipidemia, hypertension.  OBJECTIVE:   /84 (BP Location: Right arm, Patient Position: Sitting, Cuff Size: Adult Large)   Pulse 76   Temp 97.6  F (36.4  C) (Temporal)   Resp 18   Ht 1.727 m (5' 8\")   Wt 136.5 kg (301 lb)   SpO2 95%   BMI 45.77 kg/m   Estimated body mass index is 45.77 kg/m  as calculated from the following:    Height as of this encounter: 1.727 m (5' 8\").    Weight as of this encounter: 136.5 kg (301 lb).  Physical Exam  GENERAL: healthy, alert and no distress  EYES: Eyes grossly normal to inspection, PERRL and conjunctivae and sclerae normal  HENT: ear canals and TM's normal, nose and " "mouth without ulcers or lesions  NECK: no adenopathy, no asymmetry, masses, or scars and thyroid normal to palpation  RESP: lungs clear to auscultation - no rales, rhonchi or wheezes  CV: regular rate and rhythm, normal S1 S2, no S3 or S4, no murmur, click or rub, no peripheral edema and peripheral pulses strong  ABDOMEN: soft, nontender, no hepatosplenomegaly, no masses and bowel sounds normal  MS: no gross musculoskeletal defects noted, no edema  SKIN: no suspicious lesions or rashes  NEURO: Normal strength and tone, mentation intact and speech normal  PSYCH: mentation appears normal, affect normal/bright    Diagnostic Test Results:  No results found for this or any previous visit (from the past 24 hour(s)).    ASSESSMENT / PLAN:       ICD-10-CM    1. Benign essential hypertension  I10    2. Medicare annual wellness visit, subsequent  Z00.00    3. Precordial pain  R07.2 NM MPI Treadmill   4. Type 2 diabetes mellitus with other specified complication, without long-term current use of insulin (H)  E11.69    5. Hyperlipidemia LDL goal <130  E78.5 Lipid panel reflex to direct LDL Fasting   6. Morbid obesity with BMI of 45.0-49.9, adult (H)  E66.01     Z68.42    7. Screening for prostate cancer  Z12.5 PSA, screen       Patient has been advised of split billing requirements and indicates understanding: Yes    COUNSELING:  Reviewed preventive health counseling, as reflected in patient instructions       Regular exercise       Healthy diet/nutrition       Vision screening       Hearing screening       Dental care       Bladder control       Colon cancer screening       Prostate cancer screening    Estimated body mass index is 45.77 kg/m  as calculated from the following:    Height as of this encounter: 1.727 m (5' 8\").    Weight as of this encounter: 136.5 kg (301 lb).    Weight management plan: Discussed healthy diet and exercise guidelines    He reports that he has never smoked. He has never used smokeless " tobacco.      Appropriate preventive services were discussed with this patient, including applicable screening as appropriate for cardiovascular disease, diabetes, osteopenia/osteoporosis, and glaucoma.  As appropriate for age/gender, discussed screening for colorectal cancer, prostate cancer, breast cancer, and cervical cancer. Checklist reviewing preventive services available has been given to the patient.    Reviewed patients plan of care and provided an AVS. The Basic Care Plan (routine screening as documented in Health Maintenance) for Ben meets the Care Plan requirement. This Care Plan has been established and reviewed with the Patient.    Counseling Resources:  ATP IV Guidelines  Pooled Cohorts Equation Calculator  Breast Cancer Risk Calculator  Breast Cancer: Medication to Reduce Risk  FRAX Risk Assessment  ICSI Preventive Guidelines  Dietary Guidelines for Americans, 2010  USDA's MyPlate  ASA Prophylaxis  Lung CA Screening    Mina Walters DO  [facility]    Identified Health Risks:

## 2022-08-10 NOTE — LETTER
August 15, 2022      Ben Ceron  [address] [facility] [address]        Dear Ben,     Your recent test results are attached.     The microalbumin is normal.  Minimal protein loss is noted in the urine.   Urinary analysis shows no outstanding irregularities.   Hemoglobin A1c is 6.2 suggesting very good blood sugar control.   The prostate-specific antigen is consistent with a low risk of prostate cancer.   The chemistry panel is normal including the blood sugar and kidney function.   The cholesterol is within acceptable limits.     You will be contacted with any outstanding results as they are available.   Feel free to contact me via the office or My Chart if you have any questions regarding the above.       Resulted Orders   Hemoglobin A1c   Result Value Ref Range    Hemoglobin A1C 6.2 (H) 0.0 - 5.6 %      Comment:      Normal <5.7%   Prediabetes 5.7-6.4%    Diabetes 6.5% or higher     Note: Adopted from ADA consensus guidelines.   Basic metabolic panel  (Ca, Cl, CO2, Creat, Gluc, K, Na, BUN)   Result Value Ref Range    Sodium 138 133 - 144 mmol/L    Potassium 4.1 3.4 - 5.3 mmol/L    Chloride 105 94 - 109 mmol/L    Carbon Dioxide (CO2) 28 20 - 32 mmol/L    Anion Gap 5 3 - 14 mmol/L    Urea Nitrogen 23 7 - 30 mg/dL    Creatinine 0.96 0.66 - 1.25 mg/dL    Calcium 9.0 8.5 - 10.1 mg/dL    Glucose 126 (H) 70 - 99 mg/dL    GFR Estimate 83 >60 mL/min/1.73m2      Comment:      Effective December 21, 2021 eGFRcr in adults is calculated using the 2021 CKD-EPI creatinine equation which includes age and gender (Anderson partida al., NEJM, DOI: 10.1056/LBFAyp3743587)   Lipid panel reflex to direct LDL Fasting   Result Value Ref Range    Cholesterol 186 <200 mg/dL    Triglycerides 196 (H) <150 mg/dL    Direct Measure HDL 49 >=40 mg/dL    LDL Cholesterol Calculated 98 <=100 mg/dL    Non HDL Cholesterol 137 (H) <130 mg/dL    Patient Fasting > 8hrs? Yes     Narrative    Cholesterol  Desirable:  <200 mg/dL    Triglycerides  Normal:   Less than 150 mg/dL  Borderline High:  150-199 mg/dL  High:  200-499 mg/dL  Very High:  Greater than or equal to 500 mg/dL    Direct Measure HDL  Female:  Greater than or equal to 50 mg/dL   Male:  Greater than or equal to 40 mg/dL    LDL Cholesterol  Desirable:  <100mg/dL  Above Desirable:  100-129 mg/dL   Borderline High:  130-159 mg/dL   High:  160-189 mg/dL   Very High:  >= 190 mg/dL    Non HDL Cholesterol  Desirable:  130 mg/dL  Above Desirable:  130-159 mg/dL  Borderline High:  160-189 mg/dL  High:  190-219 mg/dL  Very High:  Greater than or equal to 220 mg/dL   PSA, screen   Result Value Ref Range    Prostate Specific Antigen Screen 1.62 0.00 - 4.00 ug/L    Narrative    Assay Method:  Chemiluminescence using Siemens   Vista analyzer.   UA Macro with Reflex to Micro and Culture - lab collect   Result Value Ref Range    Color Urine Yellow Colorless, Straw, Light Yellow, Yellow    Appearance Urine Clear Clear    Glucose Urine Negative Negative mg/dL    Bilirubin Urine Negative Negative    Ketones Urine Negative Negative mg/dL    Specific Gravity Urine 1.020 1.003 - 1.035    Blood Urine Negative Negative    pH Urine 6.0 5.0 - 7.0    Protein Albumin Urine Negative Negative mg/dL    Urobilinogen Urine Normal Normal, 2.0 mg/dL    Nitrite Urine Negative Negative    Leukocyte Esterase Urine Negative Negative    Narrative    Microscopic not indicated   Albumin Random Urine Quantitative with Creat Ratio   Result Value Ref Range    Creatinine Urine mg/dL 142 mg/dL    Albumin Urine mg/L 13 mg/L    Albumin Urine mg/g Cr 9.15 0.00 - 17.00 mg/g Cr       If you have any questions or concerns, please call the clinic at the number listed above.       Sincerely,    Dr. Walters's Care Team

## 2022-08-13 DIAGNOSIS — E11.9 CONTROLLED TYPE 2 DIABETES MELLITUS WITHOUT COMPLICATION, WITHOUT LONG-TERM CURRENT USE OF INSULIN (H): ICD-10-CM

## 2022-08-13 DIAGNOSIS — I10 BENIGN ESSENTIAL HYPERTENSION: ICD-10-CM

## 2022-08-15 RX ORDER — LOSARTAN POTASSIUM AND HYDROCHLOROTHIAZIDE 25; 100 MG/1; MG/1
TABLET ORAL
Qty: 90 TABLET | Refills: 0 | Status: SHIPPED | OUTPATIENT
Start: 2022-08-15 | End: 2022-10-31

## 2022-08-15 RX ORDER — METOPROLOL SUCCINATE 100 MG/1
TABLET, EXTENDED RELEASE ORAL
Qty: 90 TABLET | Refills: 0 | Status: SHIPPED | OUTPATIENT
Start: 2022-08-15 | End: 2022-10-31

## 2022-08-15 NOTE — TELEPHONE ENCOUNTER
Pt called and prescriptions were not renewed at his last visit as they were supposed to be. Needs by tomorrow 8/15.

## 2022-08-16 ENCOUNTER — HOSPITAL ENCOUNTER (OUTPATIENT)
Dept: NUCLEAR MEDICINE | Facility: CLINIC | Age: 77
Setting detail: NUCLEAR MEDICINE
Discharge: HOME | End: 2022-08-16
Attending: INTERNAL MEDICINE
Payer: MEDICARE

## 2022-08-16 ENCOUNTER — HOSPITAL ENCOUNTER (OUTPATIENT)
Dept: CARDIOLOGY | Facility: CLINIC | Age: 77
Setting detail: NUCLEAR MEDICINE
Discharge: HOME | End: 2022-08-16
Attending: INTERNAL MEDICINE
Payer: MEDICARE

## 2022-08-16 DIAGNOSIS — R07.2 PRECORDIAL PAIN: ICD-10-CM

## 2022-08-16 PROCEDURE — 93016 CV STRESS TEST SUPVJ ONLY: CPT | Performed by: INTERNAL MEDICINE

## 2022-08-16 PROCEDURE — 78452 HT MUSCLE IMAGE SPECT MULT: CPT | Mod: 26 | Performed by: INTERNAL MEDICINE

## 2022-08-16 PROCEDURE — 93017 CV STRESS TEST TRACING ONLY: CPT | Performed by: REHABILITATION PRACTITIONER

## 2022-08-16 PROCEDURE — G1010 CDSM STANSON: HCPCS | Performed by: INTERNAL MEDICINE

## 2022-08-16 PROCEDURE — 93018 CV STRESS TEST I&R ONLY: CPT | Performed by: INTERNAL MEDICINE

## 2022-08-16 PROCEDURE — 93017 CV STRESS TEST TRACING ONLY: CPT

## 2022-08-16 PROCEDURE — 343N000001 HC RX 343: Performed by: INTERNAL MEDICINE

## 2022-08-16 PROCEDURE — A9502 TC99M TETROFOSMIN: HCPCS | Performed by: INTERNAL MEDICINE

## 2022-08-16 RX ADMIN — Medication 36.5 MILLICURIE: at 10:49

## 2022-08-18 ENCOUNTER — HOSPITAL ENCOUNTER (OUTPATIENT)
Dept: NUCLEAR MEDICINE | Facility: CLINIC | Age: 77
Setting detail: NUCLEAR MEDICINE
Discharge: HOME | End: 2022-08-18
Attending: INTERNAL MEDICINE
Payer: MEDICARE

## 2022-08-18 LAB
CV STRESS MAX HR HE: 137
NUC STRESS EJECTION FRACTION: 73 %
RATE PRESSURE PRODUCT: NORMAL
STRESS ANGINA INDEX: 0
STRESS ECHO BASELINE DIASTOLIC HE: 62
STRESS ECHO BASELINE HR: 92 BPM
STRESS ECHO BASELINE SYSTOLIC BP: 128
STRESS ECHO CALCULATED PERCENT HR: 94 %
STRESS ECHO LAST STRESS DIASTOLIC BP: 64
STRESS ECHO LAST STRESS SYSTOLIC BP: 188
STRESS ECHO POST ESTIMATED WORKLOAD: 6.5 METS
STRESS ECHO POST EXERCISE DUR MIN: 4 MIN
STRESS ECHO POST EXERCISE DUR SEC: 4 SEC
STRESS ECHO TARGET HR: 146

## 2022-08-18 PROCEDURE — 343N000001 HC RX 343: Performed by: INTERNAL MEDICINE

## 2022-08-18 PROCEDURE — A9502 TC99M TETROFOSMIN: HCPCS | Performed by: INTERNAL MEDICINE

## 2022-08-18 RX ADMIN — TETROFOSMIN 36.2 MCI.: 1.38 INJECTION, POWDER, LYOPHILIZED, FOR SOLUTION INTRAVENOUS at 12:30

## 2022-08-24 ENCOUNTER — TELEPHONE (OUTPATIENT)
Dept: INTERNAL MEDICINE | Facility: CLINIC | Age: 77
End: 2022-08-24

## 2022-08-24 NOTE — TELEPHONE ENCOUNTER
Test Results        Who ordered the test:  Dr Walters     Type of test: Stress Test    Date of test:  08/16 & 08/18    Where was the test performed:  [facility]    What are your questions/concerns?:  Patient is waiting to get his results please call    Okay to leave a detailed message?: Yes at Cell number on file:    Telephone Information:   Mobile [phone]

## 2022-08-26 NOTE — TELEPHONE ENCOUNTER
----- Message from Mina Walters DO sent at 8/26/2022  3:42 PM CDT -----  Please call            Dear Ben, your recent test results are attached.    A nuclear stress test is negative for inducible ischemia and shows an ejection fraction of 73%.  This is a normal test.        You will be contacted with any outstanding results as they are available.  Feel free to contact me via the office or My Chart if you have any questions regarding the above.

## 2022-09-21 DIAGNOSIS — N40.1 BENIGN PROSTATIC HYPERPLASIA WITH URINARY FREQUENCY: ICD-10-CM

## 2022-09-21 DIAGNOSIS — R35.0 BENIGN PROSTATIC HYPERPLASIA WITH URINARY FREQUENCY: ICD-10-CM

## 2022-09-23 RX ORDER — FINASTERIDE 5 MG/1
TABLET, FILM COATED ORAL
Qty: 90 TABLET | Refills: 1 | Status: SHIPPED | OUTPATIENT
Start: 2022-09-23 | End: 2022-10-31

## 2022-09-23 NOTE — TELEPHONE ENCOUNTER
FYI - Status Update    Who is Calling: patient    Update: patient states that he is almost out of medication     Does caller want a call/response back: No

## 2022-10-31 DIAGNOSIS — E11.9 CONTROLLED TYPE 2 DIABETES MELLITUS WITHOUT COMPLICATION, WITHOUT LONG-TERM CURRENT USE OF INSULIN (H): ICD-10-CM

## 2022-10-31 DIAGNOSIS — I10 BENIGN ESSENTIAL HYPERTENSION: ICD-10-CM

## 2022-10-31 DIAGNOSIS — R35.0 BENIGN PROSTATIC HYPERPLASIA WITH URINARY FREQUENCY: ICD-10-CM

## 2022-10-31 DIAGNOSIS — N40.1 BENIGN PROSTATIC HYPERPLASIA WITH URINARY FREQUENCY: ICD-10-CM

## 2022-10-31 NOTE — TELEPHONE ENCOUNTER
Patient was in for well physical, [place] and would like to have a 3 month supply with 3 refills sent to pharmacy so that he has them for the year.  He states this usually gets done at his physical but thinks it may have been missed.    Edda REDDINGO/

## 2022-11-01 RX ORDER — FINASTERIDE 5 MG/1
1 TABLET, FILM COATED ORAL DAILY
Qty: 90 TABLET | Refills: 3 | Status: SHIPPED | OUTPATIENT
Start: 2022-11-01 | End: 2023-09-20

## 2022-11-01 RX ORDER — LOSARTAN POTASSIUM AND HYDROCHLOROTHIAZIDE 25; 100 MG/1; MG/1
1 TABLET ORAL DAILY
Qty: 90 TABLET | Refills: 3 | Status: SHIPPED | OUTPATIENT
Start: 2022-11-01 | End: 2023-09-20

## 2022-11-01 RX ORDER — METOPROLOL SUCCINATE 100 MG/1
100 TABLET, EXTENDED RELEASE ORAL DAILY
Qty: 90 TABLET | Refills: 3 | Status: SHIPPED | OUTPATIENT
Start: 2022-11-01 | End: 2023-09-20

## 2023-09-20 ENCOUNTER — OFFICE VISIT (OUTPATIENT)
Dept: INTERNAL MEDICINE | Facility: CLINIC | Age: 78
End: 2023-09-20
Payer: COMMERCIAL

## 2023-09-20 VITALS
WEIGHT: 297.1 LBS | DIASTOLIC BLOOD PRESSURE: 78 MMHG | BODY MASS INDEX: 45.03 KG/M2 | HEART RATE: 64 BPM | RESPIRATION RATE: 18 BRPM | HEIGHT: 68 IN | OXYGEN SATURATION: 98 % | SYSTOLIC BLOOD PRESSURE: 138 MMHG | TEMPERATURE: 97 F

## 2023-09-20 DIAGNOSIS — N40.1 BENIGN PROSTATIC HYPERPLASIA WITH URINARY FREQUENCY: ICD-10-CM

## 2023-09-20 DIAGNOSIS — E11.9 CONTROLLED TYPE 2 DIABETES MELLITUS WITHOUT COMPLICATION, WITHOUT LONG-TERM CURRENT USE OF INSULIN (H): ICD-10-CM

## 2023-09-20 DIAGNOSIS — R35.0 BENIGN PROSTATIC HYPERPLASIA WITH URINARY FREQUENCY: ICD-10-CM

## 2023-09-20 DIAGNOSIS — Z01.818 PREOP GENERAL PHYSICAL EXAM: Primary | ICD-10-CM

## 2023-09-20 DIAGNOSIS — Z23 NEED FOR INFLUENZA VACCINATION: ICD-10-CM

## 2023-09-20 DIAGNOSIS — I10 ESSENTIAL HYPERTENSION WITH GOAL BLOOD PRESSURE LESS THAN 140/90: ICD-10-CM

## 2023-09-20 DIAGNOSIS — Z12.11 SCREEN FOR COLON CANCER: ICD-10-CM

## 2023-09-20 DIAGNOSIS — H25.9 AGE-RELATED CATARACT OF BOTH EYES, UNSPECIFIED AGE-RELATED CATARACT TYPE: ICD-10-CM

## 2023-09-20 DIAGNOSIS — E78.5 HYPERLIPIDEMIA LDL GOAL <130: ICD-10-CM

## 2023-09-20 LAB
ANION GAP SERPL CALCULATED.3IONS-SCNC: 9 MMOL/L (ref 7–15)
BUN SERPL-MCNC: 24.1 MG/DL (ref 8–23)
CALCIUM SERPL-MCNC: 9.6 MG/DL (ref 8.8–10.2)
CHLORIDE SERPL-SCNC: 101 MMOL/L (ref 98–107)
CHOLEST SERPL-MCNC: 189 MG/DL
CREAT SERPL-MCNC: 0.98 MG/DL (ref 0.67–1.17)
CREAT UR-MCNC: 175 MG/DL
DEPRECATED HCO3 PLAS-SCNC: 28 MMOL/L (ref 22–29)
EGFRCR SERPLBLD CKD-EPI 2021: 80 ML/MIN/1.73M2
GLUCOSE SERPL-MCNC: 121 MG/DL (ref 70–99)
HBA1C MFR BLD: 6.3 %
HDLC SERPL-MCNC: 51 MG/DL
LDLC SERPL CALC-MCNC: 107 MG/DL
MICROALBUMIN UR-MCNC: 17.9 MG/L
MICROALBUMIN/CREAT UR: 10.23 MG/G CR (ref 0–17)
NONHDLC SERPL-MCNC: 138 MG/DL
POTASSIUM SERPL-SCNC: 4.4 MMOL/L (ref 3.4–5.3)
SODIUM SERPL-SCNC: 138 MMOL/L (ref 136–145)
TRIGL SERPL-MCNC: 154 MG/DL

## 2023-09-20 PROCEDURE — G0008 ADMIN INFLUENZA VIRUS VAC: HCPCS | Performed by: INTERNAL MEDICINE

## 2023-09-20 PROCEDURE — 90662 IIV NO PRSV INCREASED AG IM: CPT | Performed by: INTERNAL MEDICINE

## 2023-09-20 PROCEDURE — 36415 COLL VENOUS BLD VENIPUNCTURE: CPT | Performed by: INTERNAL MEDICINE

## 2023-09-20 PROCEDURE — 82570 ASSAY OF URINE CREATININE: CPT | Performed by: INTERNAL MEDICINE

## 2023-09-20 PROCEDURE — 80048 BASIC METABOLIC PNL TOTAL CA: CPT | Performed by: INTERNAL MEDICINE

## 2023-09-20 PROCEDURE — 83036 HEMOGLOBIN GLYCOSYLATED A1C: CPT | Performed by: INTERNAL MEDICINE

## 2023-09-20 PROCEDURE — 80061 LIPID PANEL: CPT | Performed by: INTERNAL MEDICINE

## 2023-09-20 PROCEDURE — 99214 OFFICE O/P EST MOD 30 MIN: CPT | Mod: 25 | Performed by: INTERNAL MEDICINE

## 2023-09-20 PROCEDURE — 82043 UR ALBUMIN QUANTITATIVE: CPT | Performed by: INTERNAL MEDICINE

## 2023-09-20 RX ORDER — FINASTERIDE 5 MG/1
1 TABLET, FILM COATED ORAL DAILY
Qty: 90 TABLET | Refills: 3 | Status: SHIPPED | OUTPATIENT
Start: 2023-09-20 | End: 2024-08-29

## 2023-09-20 RX ORDER — METOPROLOL SUCCINATE 100 MG/1
100 TABLET, EXTENDED RELEASE ORAL DAILY
Qty: 90 TABLET | Refills: 3 | Status: SHIPPED | OUTPATIENT
Start: 2023-09-20

## 2023-09-20 RX ORDER — LOSARTAN POTASSIUM AND HYDROCHLOROTHIAZIDE 25; 100 MG/1; MG/1
1 TABLET ORAL DAILY
Qty: 90 TABLET | Refills: 3 | Status: SHIPPED | OUTPATIENT
Start: 2023-09-20

## 2023-09-20 RX ORDER — EZETIMIBE 10 MG/1
10 TABLET ORAL DAILY
Qty: 90 TABLET | Refills: 3 | Status: SHIPPED | OUTPATIENT
Start: 2023-09-20

## 2023-09-20 ASSESSMENT — PAIN SCALES - GENERAL: PAINLEVEL: NO PAIN (0)

## 2023-09-20 NOTE — H&P (VIEW-ONLY)
[address] [address]  Phone: [phone]  Primary Provider: Mina Walters  Pre-op Performing Provider: MINA WALTERS      PREOPERATIVE EVALUATION:  Today's date: 9/20/2023    Ben is a 77 year old male who presents for a preoperative evaluation.      9/20/2023     9:49 AM   Additional Questions   Roomed by Zuleima SANON       Surgical Information:  Surgery/Procedure: Phacoemulsification with standard intraocular lens implant   Surgery Location: [facility]  Surgeon: Dr. Ardon  Surgery Date: 10/05/2023  Time of Surgery: 9:45  Where patient plans to recover: At home with family  Fax number for surgical facility: Note does not need to be faxed, will be available electronically in Epic.    Assessment & Plan     The proposed surgical procedure is considered LOW risk.    Preop general physical exam      Age-related cataract of both eyes, unspecified age-related cataract type      Controlled type 2 diabetes mellitus without complication, without long-term current use of insulin (H)    - HEMOGLOBIN A1C; Future  - Albumin Random Urine Quantitative with Creat Ratio; Future  - metFORMIN (GLUCOPHAGE) 500 MG tablet; Take 1 tablet (500 mg) by mouth 2 times daily (with meals)    Essential hypertension with goal blood pressure less than 140/90    - BASIC METABOLIC PANEL; Future  - losartan-hydrochlorothiazide (HYZAAR) 100-25 MG tablet; Take 1 tablet by mouth daily  - metoprolol succinate ER (TOPROL XL) 100 MG 24 hr tablet; Take 1 tablet (100 mg) by mouth daily  - NIFEdipine ER (ADALAT CC) 60 MG 24 hr tablet; Take 1 tablet (60 mg) by mouth daily    Hyperlipidemia LDL goal <130    - Lipid panel reflex to direct LDL Non-fasting; Future  - ezetimibe (ZETIA) 10 MG tablet; Take 1 tablet (10 mg) by mouth daily    Benign prostatic hyperplasia with urinary frequency    - finasteride (PROSCAR) 5 MG tablet; Take 1 tablet (5 mg) by  mouth daily    Need for influenza vaccination    - INFLUENZA VACCINE 65+ (FLUZONE HD)    Screen for colon cancer    - Colonoscopy Screening  Referral; Future            - No identified additional risk factors other than previously addressed    Antiplatelet or Anticoagulation Medication Instructions:   - aspirin: Bleeding risk is low for this procedure and daily aspirin may be continued without modification.     Additional Medication Instructions:  patient is to hold metformin the morning of the procedure.    RECOMMENDATION:  APPROVAL GIVEN to proceed with proposed procedure, without further diagnostic evaluation.            Subjective       HPI related to upcoming procedure: Phacoemulsification with standard intraocular lens implant         9/20/2023     9:29 AM   Preop Questions   1. Have you ever had a heart attack or stroke? No   2. Have you ever had surgery on your heart or blood vessels, such as a stent placement, a coronary artery bypass, or surgery on an artery in your head, neck, heart, or legs? No   3. Do you have chest pain with activity? No   4. Do you have a history of  heart failure? No   5. Do you currently have a cold, bronchitis or symptoms of other infection? No   6. Do you have a cough, shortness of breath, or wheezing? No   7. Do you or anyone in your family have previous history of blood clots? No   8. Do you or does anyone in your family have a serious bleeding problem such as prolonged bleeding following surgeries or cuts? No   9. Have you ever had problems with anemia or been told to take iron pills? No   10. Have you had any abnormal blood loss such as black, tarry or bloody stools? No   11. Have you ever had a blood transfusion? No   12. Are you willing to have a blood transfusion if it is medically needed before, during, or after your surgery? Yes   13. Have you or any of your relatives ever had problems with anesthesia? No   14. Do you have sleep apnea, excessive snoring or  daytime drowsiness? YES - sleep apnea   14a. Do you have a CPAP machine? Yes   15. Do you have any artifical heart valves or other implanted medical devices like a pacemaker, defibrillator, or continuous glucose monitor? No   16. Do you have artificial joints? No   17. Are you allergic to latex? No       Health Care Directive:  Patient has a Health Care Directive on file      Preoperative Review of :   reviewed - no record of controlled substances prescribed.          Review of Systems  CONSTITUTIONAL: NEGATIVE for fever, chills, change in weight  INTEGUMENTARY/SKIN: NEGATIVE for worrisome rashes, moles or lesions  EYES: Decreased visual acuity secondary to the presence of bilateral cataract  ENT/MOUTH: NEGATIVE for ear, mouth and throat problems  RESP: NEGATIVE for significant cough or SOB  CV: NEGATIVE for chest pain, palpitations or peripheral edema  GI: NEGATIVE for nausea, abdominal pain, heartburn, or change in bowel habits  : NEGATIVE for frequency, dysuria, or hematuria  MUSCULOSKELETAL: NEGATIVE for significant arthralgias or myalgia  NEURO: NEGATIVE for weakness, dizziness or paresthesias  ENDOCRINE: NEGATIVE for temperature intolerance, skin/hair changes  HEME: NEGATIVE for bleeding problems  PSYCHIATRIC: NEGATIVE for changes in mood or affect    Patient Active Problem List    Diagnosis Date Noted    Type 2 diabetes mellitus with other specified complication, without long-term current use of insulin (H) 08/10/2022     Priority: Medium    Strain of right biceps muscle, subsequent encounter 09/21/2018     Priority: Medium    S/P arthroscopy of right knee 08/13/2018     Priority: Medium    Primary osteoarthritis of right knee 08/01/2018     Priority: Medium    Complex tear of medial meniscus of right knee as current injury, initial encounter 08/01/2018     Priority: Medium    Benign prostatic hyperplasia with urinary frequency 04/27/2018     Priority: Medium    Hyperlipidemia LDL goal <100 05/03/2017      Priority: Medium    Primary osteoarthritis of left knee 12/15/2016     Priority: Medium    Benign non-nodular prostatic hyperplasia 05/31/2016     Priority: Medium    Morbid obesity with BMI of 45.0-49.9, adult (H) 04/04/2016     Priority: Medium    Family history of colon cancer 04/04/2016     Priority: Medium     brother with colonoscopy      Rash 04/04/2016     Priority: Medium    LUCAS (obstructive sleep apnea) 04/04/2016     Priority: Medium    Essential hypertension with goal blood pressure less than 140/90 08/27/2013     Priority: Medium    Advance Care Planning 11/18/2011     Priority: Medium     Advance Care Planning: Receipt of ACP document:  Received: Health Care Directive which was witnessed or notarized on 12-22-10.  Document previously scanned on 11-15-11.  Validation form completed and sent to be scanned.  Code Status should state FULL code.  Confirmed/documented designated decision maker(s). See permanent comments section of demographics in clinical tab. View document(s) and details by clicking on code status. Added by Meena Prescott RN, System ACP Coordinator [employer] [employer] on 12/3/2014.  11/18/11Received outside Health Care Directive. Previously signed by patient and notary on 12/22/2010.  scanned and placed behind media tab on 11/15/11.  Please see Health Care Directive for specifics. Code status updated...Marylu Cruz RN          Past Medical History:   Diagnosis Date    Benign non-nodular prostatic hyperplasia, presence of lower urinary tract symptoms unspecified 5/31/2016    Colonic polyps     Family history of colon cancer 4/4/2016    brother with colonoscopy     Hyperlipidemia LDL goal <100 5/3/2017    Hyperlipidemia LDL goal <130 12/8/2016    LUCAS (obstructive sleep apnea) 4/4/2016    Rash 4/4/2016     Past Surgical History:   Procedure Laterality Date    ARTHROSCOPY KNEE WITH MEDIAL MENISCECTOMY  6/6/2014    Procedure: ARTHROSCOPY KNEE WITH MEDIAL MENISCECTOMY;  Surgeon: Alberto  Ramana Robins DO;  Location: [facility] OR    ARTHROSCOPY KNEE WITH MEDIAL MENISCECTOMY Right 8/3/2018    Procedure: ARTHROSCOPY KNEE WITH MEDIAL MENISCECTOMY;  Right knee arthroscopy with partial meniscectomy;  Surgeon: Ramana Dominguez DO;  Location: [facility] OR    COLONOSCOPY  11/29/2011    Polypectomy.    COLONOSCOPY N/A 11/12/2014    Procedure: COLONOSCOPY;  Surgeon: Brooks Burris MD;  Location: [facility]    COLONOSCOPY N/A 9/25/2020    Procedure: Colonoscopy with  polypectomy by biopsy;  Surgeon: Brooks Burris MD;  Location: [facility]    HC VASECTOMY UNILAT/BILAT W POSTOP SEMEN  1977    Vasectomy    ZZHC COLONOSCOPY W BIOPSY  11/24/08    ZZHC COLONOSCOPY W/WO BRUSH/WASH  11/21/2005    Polypectomy.     Current Outpatient Medications   Medication Sig Dispense Refill    Acetaminophen (TYLENOL PO) As needed      aspirin 81 MG tablet Take by mouth daily 30 tablet     Cholecalciferol (VITAMIN D) 2000 UNITS tablet Take 1 tablet by mouth daily.      ezetimibe (ZETIA) 10 MG tablet Take 1 tablet (10 mg) by mouth daily 90 tablet 1    finasteride (PROSCAR) 5 MG tablet Take 1 tablet (5 mg) by mouth daily 90 tablet 3    losartan-hydrochlorothiazide (HYZAAR) 100-25 MG tablet Take 1 tablet by mouth daily 90 tablet 3    metFORMIN (GLUCOPHAGE) 500 MG tablet Take 1 tablet (500 mg) by mouth 2 times daily (with meals) 180 tablet 3    metoprolol succinate ER (TOPROL XL) 100 MG 24 hr tablet Take 1 tablet (100 mg) by mouth daily 90 tablet 3    NIFEdipine ER (ADALAT CC) 60 MG 24 hr tablet Take 1 tablet (60 mg) by mouth daily 90 tablet 3    order for DME Equipment being ordered: CPAP and related hardware, mask and tubing as well heated humidity equipment. 1 Units 0    VITAMIN E 400 UNIT OR CAPS   0    ADVIL 200 MG OR TABS Reported on 5/2/2017 (Patient not taking: Reported on 9/20/2023) 120 0    alcohol swab prep pads Use to swab area of injection/federica as directed. (Patient not taking: Reported on 9/20/2023) 100 each 3    blood  "glucose (NO BRAND SPECIFIED) test strip Use to test blood sugar 1 times daily or as directed. To accompany: Blood Glucose Monitor Brands: per insurance. (Patient not taking: Reported on 8/6/2021) 100 strip 6    blood glucose calibration (NO BRAND SPECIFIED) solution To accompany: Blood Glucose Monitor Brands: per insurance. (Patient not taking: Reported on 8/6/2021) 1 Bottle 3    blood glucose monitoring (NO BRAND SPECIFIED) meter device kit Use to test blood sugar 1 times daily or as directed. Preferred blood glucose meter OR supplies to accompany: Blood Glucose Monitor Brands: per insurance. (Patient not taking: Reported on 8/6/2021) 1 kit 0    clobetasol (TEMOVATE) 0.05 % cream Small amount to affected areas BID (Patient not taking: Reported on 9/20/2023) 60 g 1    desoximetasone (TOPICORT) 0.25 % cream use as directed to legs (Patient not taking: Reported on 9/20/2023) 100 g 1    thin (NO BRAND SPECIFIED) lancets Use with lanceting device. To accompany: Blood Glucose Monitor Brands: per insurance. (Patient not taking: Reported on 9/20/2023) 100 each 6       Allergies   Allergen Reactions    Norvasc [Amlodipine] Muscle Pain (Myalgia)    Simvastatin Rash     Rash and edema        Social History     Tobacco Use    Smoking status: Never    Smokeless tobacco: Never   Substance Use Topics    Alcohol use: Yes     Alcohol/week: 0.0 standard drinks of alcohol     Comment: occasional     Family History   Problem Relation Age of Onset    Cancer Mother         Lung Cancer    Cancer - colorectal Father         Stomach Cancer    Cancer Brother      History   Drug Use No         Objective     /78 (BP Location: Right arm, Patient Position: Chair)   Pulse 64   Temp 97  F (36.1  C) (Temporal)   Resp 18   Ht 1.727 m (5' 8\")   Wt 134.8 kg (297 lb 1.6 oz)   SpO2 98%   BMI 45.17 kg/m      Physical Exam    GENERAL APPEARANCE: healthy, alert and no distress     EYES: Ocular examination is deferred to the expertise of the " patient's ophthalmologist     HENT: ear canals and TM's normal and nose and mouth without ulcers or lesions     NECK: no adenopathy, no asymmetry, masses, or scars and thyroid normal to palpation     RESP: lungs clear to auscultation - no rales, rhonchi or wheezes     CV: regular rates and rhythm, normal S1 S2, no S3 or S4 and no murmur, click or rub     ABDOMEN:  soft, nontender, no HSM or masses and bowel sounds normal     MS: extremities normal- no gross deformities noted, no evidence of inflammation in joints, FROM in all extremities.     SKIN: no suspicious lesions or rashes     NEURO: Normal strength and tone, sensory exam grossly normal, mentation intact and speech normal     PSYCH: mentation appears normal. and affect normal/bright     LYMPHATICS: No cervical adenopathy    Recent Labs   Lab Test 08/10/22  0928      POTASSIUM 4.1   CR 0.96   A1C 6.2*        Diagnostics:  Labs pending at this time.  Results will be reviewed when available.   No EKG required for low risk surgery (cataract, skin procedure, breast biopsy, etc).    Revised Cardiac Risk Index (RCRI):  The patient has the following serious cardiovascular risks for perioperative complications:   - No serious cardiac risks = 0 points     RCRI Interpretation: 0 points: Class I (very low risk - 0.4% complication rate)         Signed Electronically by: Mina Walters DO  Copy of this evaluation report is provided to requesting physician.

## 2023-09-20 NOTE — H&P (VIEW-ONLY)
[address] [address]  Phone: [phone]  Primary Provider: Mina Walters  Pre-op Performing Provider: MINA WALTERS      PREOPERATIVE EVALUATION:  Today's date: 9/20/2023    Ben is a 77 year old male who presents for a preoperative evaluation.      9/20/2023     9:49 AM   Additional Questions   Roomed by Zuleima SANON       Surgical Information:  Surgery/Procedure: Phacoemulsification with standard intraocular lens implant   Surgery Location: [facility]  Surgeon: Dr. Ardon  Surgery Date: 10/05/2023  Time of Surgery: 9:45  Where patient plans to recover: At home with family  Fax number for surgical facility: Note does not need to be faxed, will be available electronically in Epic.    Assessment & Plan     The proposed surgical procedure is considered LOW risk.    Preop general physical exam      Age-related cataract of both eyes, unspecified age-related cataract type      Controlled type 2 diabetes mellitus without complication, without long-term current use of insulin (H)    - HEMOGLOBIN A1C; Future  - Albumin Random Urine Quantitative with Creat Ratio; Future  - metFORMIN (GLUCOPHAGE) 500 MG tablet; Take 1 tablet (500 mg) by mouth 2 times daily (with meals)    Essential hypertension with goal blood pressure less than 140/90    - BASIC METABOLIC PANEL; Future  - losartan-hydrochlorothiazide (HYZAAR) 100-25 MG tablet; Take 1 tablet by mouth daily  - metoprolol succinate ER (TOPROL XL) 100 MG 24 hr tablet; Take 1 tablet (100 mg) by mouth daily  - NIFEdipine ER (ADALAT CC) 60 MG 24 hr tablet; Take 1 tablet (60 mg) by mouth daily    Hyperlipidemia LDL goal <130    - Lipid panel reflex to direct LDL Non-fasting; Future  - ezetimibe (ZETIA) 10 MG tablet; Take 1 tablet (10 mg) by mouth daily    Benign prostatic hyperplasia with urinary frequency    - finasteride (PROSCAR) 5 MG tablet; Take 1 tablet (5 mg) by  mouth daily    Need for influenza vaccination    - INFLUENZA VACCINE 65+ (FLUZONE HD)    Screen for colon cancer    - Colonoscopy Screening  Referral; Future            - No identified additional risk factors other than previously addressed    Antiplatelet or Anticoagulation Medication Instructions:   - aspirin: Bleeding risk is low for this procedure and daily aspirin may be continued without modification.     Additional Medication Instructions:  patient is to hold metformin the morning of the procedure.    RECOMMENDATION:  APPROVAL GIVEN to proceed with proposed procedure, without further diagnostic evaluation.            Subjective       HPI related to upcoming procedure: Phacoemulsification with standard intraocular lens implant         9/20/2023     9:29 AM   Preop Questions   1. Have you ever had a heart attack or stroke? No   2. Have you ever had surgery on your heart or blood vessels, such as a stent placement, a coronary artery bypass, or surgery on an artery in your head, neck, heart, or legs? No   3. Do you have chest pain with activity? No   4. Do you have a history of  heart failure? No   5. Do you currently have a cold, bronchitis or symptoms of other infection? No   6. Do you have a cough, shortness of breath, or wheezing? No   7. Do you or anyone in your family have previous history of blood clots? No   8. Do you or does anyone in your family have a serious bleeding problem such as prolonged bleeding following surgeries or cuts? No   9. Have you ever had problems with anemia or been told to take iron pills? No   10. Have you had any abnormal blood loss such as black, tarry or bloody stools? No   11. Have you ever had a blood transfusion? No   12. Are you willing to have a blood transfusion if it is medically needed before, during, or after your surgery? Yes   13. Have you or any of your relatives ever had problems with anesthesia? No   14. Do you have sleep apnea, excessive snoring or  daytime drowsiness? YES - sleep apnea   14a. Do you have a CPAP machine? Yes   15. Do you have any artifical heart valves or other implanted medical devices like a pacemaker, defibrillator, or continuous glucose monitor? No   16. Do you have artificial joints? No   17. Are you allergic to latex? No       Health Care Directive:  Patient has a Health Care Directive on file      Preoperative Review of :   reviewed - no record of controlled substances prescribed.          Review of Systems  CONSTITUTIONAL: NEGATIVE for fever, chills, change in weight  INTEGUMENTARY/SKIN: NEGATIVE for worrisome rashes, moles or lesions  EYES: Decreased visual acuity secondary to the presence of bilateral cataract  ENT/MOUTH: NEGATIVE for ear, mouth and throat problems  RESP: NEGATIVE for significant cough or SOB  CV: NEGATIVE for chest pain, palpitations or peripheral edema  GI: NEGATIVE for nausea, abdominal pain, heartburn, or change in bowel habits  : NEGATIVE for frequency, dysuria, or hematuria  MUSCULOSKELETAL: NEGATIVE for significant arthralgias or myalgia  NEURO: NEGATIVE for weakness, dizziness or paresthesias  ENDOCRINE: NEGATIVE for temperature intolerance, skin/hair changes  HEME: NEGATIVE for bleeding problems  PSYCHIATRIC: NEGATIVE for changes in mood or affect    Patient Active Problem List    Diagnosis Date Noted    Type 2 diabetes mellitus with other specified complication, without long-term current use of insulin (H) 08/10/2022     Priority: Medium    Strain of right biceps muscle, subsequent encounter 09/21/2018     Priority: Medium    S/P arthroscopy of right knee 08/13/2018     Priority: Medium    Primary osteoarthritis of right knee 08/01/2018     Priority: Medium    Complex tear of medial meniscus of right knee as current injury, initial encounter 08/01/2018     Priority: Medium    Benign prostatic hyperplasia with urinary frequency 04/27/2018     Priority: Medium    Hyperlipidemia LDL goal <100 05/03/2017      Priority: Medium    Primary osteoarthritis of left knee 12/15/2016     Priority: Medium    Benign non-nodular prostatic hyperplasia 05/31/2016     Priority: Medium    Morbid obesity with BMI of 45.0-49.9, adult (H) 04/04/2016     Priority: Medium    Family history of colon cancer 04/04/2016     Priority: Medium     brother with colonoscopy      Rash 04/04/2016     Priority: Medium    LUCAS (obstructive sleep apnea) 04/04/2016     Priority: Medium    Essential hypertension with goal blood pressure less than 140/90 08/27/2013     Priority: Medium    Advance Care Planning 11/18/2011     Priority: Medium     Advance Care Planning: Receipt of ACP document:  Received: Health Care Directive which was witnessed or notarized on 12-22-10.  Document previously scanned on 11-15-11.  Validation form completed and sent to be scanned.  Code Status should state FULL code.  Confirmed/documented designated decision maker(s). See permanent comments section of demographics in clinical tab. View document(s) and details by clicking on code status. Added by Meena Prescott RN, System ACP Coordinator [employer] [employer] on 12/3/2014.  11/18/11Received outside Health Care Directive. Previously signed by patient and notary on 12/22/2010.  scanned and placed behind media tab on 11/15/11.  Please see Health Care Directive for specifics. Code status updated...Marylu Cruz RN          Past Medical History:   Diagnosis Date    Benign non-nodular prostatic hyperplasia, presence of lower urinary tract symptoms unspecified 5/31/2016    Colonic polyps     Family history of colon cancer 4/4/2016    brother with colonoscopy     Hyperlipidemia LDL goal <100 5/3/2017    Hyperlipidemia LDL goal <130 12/8/2016    LUCAS (obstructive sleep apnea) 4/4/2016    Rash 4/4/2016     Past Surgical History:   Procedure Laterality Date    ARTHROSCOPY KNEE WITH MEDIAL MENISCECTOMY  6/6/2014    Procedure: ARTHROSCOPY KNEE WITH MEDIAL MENISCECTOMY;  Surgeon: Alberto  Ramana Robins DO;  Location: [facility] OR    ARTHROSCOPY KNEE WITH MEDIAL MENISCECTOMY Right 8/3/2018    Procedure: ARTHROSCOPY KNEE WITH MEDIAL MENISCECTOMY;  Right knee arthroscopy with partial meniscectomy;  Surgeon: Ramana Dominguez DO;  Location: [facility] OR    COLONOSCOPY  11/29/2011    Polypectomy.    COLONOSCOPY N/A 11/12/2014    Procedure: COLONOSCOPY;  Surgeon: Brooks Burris MD;  Location: [facility]    COLONOSCOPY N/A 9/25/2020    Procedure: Colonoscopy with  polypectomy by biopsy;  Surgeon: Brooks Burris MD;  Location: [facility]    HC VASECTOMY UNILAT/BILAT W POSTOP SEMEN  1977    Vasectomy    ZZHC COLONOSCOPY W BIOPSY  11/24/08    ZZHC COLONOSCOPY W/WO BRUSH/WASH  11/21/2005    Polypectomy.     Current Outpatient Medications   Medication Sig Dispense Refill    Acetaminophen (TYLENOL PO) As needed      aspirin 81 MG tablet Take by mouth daily 30 tablet     Cholecalciferol (VITAMIN D) 2000 UNITS tablet Take 1 tablet by mouth daily.      ezetimibe (ZETIA) 10 MG tablet Take 1 tablet (10 mg) by mouth daily 90 tablet 1    finasteride (PROSCAR) 5 MG tablet Take 1 tablet (5 mg) by mouth daily 90 tablet 3    losartan-hydrochlorothiazide (HYZAAR) 100-25 MG tablet Take 1 tablet by mouth daily 90 tablet 3    metFORMIN (GLUCOPHAGE) 500 MG tablet Take 1 tablet (500 mg) by mouth 2 times daily (with meals) 180 tablet 3    metoprolol succinate ER (TOPROL XL) 100 MG 24 hr tablet Take 1 tablet (100 mg) by mouth daily 90 tablet 3    NIFEdipine ER (ADALAT CC) 60 MG 24 hr tablet Take 1 tablet (60 mg) by mouth daily 90 tablet 3    order for DME Equipment being ordered: CPAP and related hardware, mask and tubing as well heated humidity equipment. 1 Units 0    VITAMIN E 400 UNIT OR CAPS   0    ADVIL 200 MG OR TABS Reported on 5/2/2017 (Patient not taking: Reported on 9/20/2023) 120 0    alcohol swab prep pads Use to swab area of injection/federica as directed. (Patient not taking: Reported on 9/20/2023) 100 each 3    blood  "glucose (NO BRAND SPECIFIED) test strip Use to test blood sugar 1 times daily or as directed. To accompany: Blood Glucose Monitor Brands: per insurance. (Patient not taking: Reported on 8/6/2021) 100 strip 6    blood glucose calibration (NO BRAND SPECIFIED) solution To accompany: Blood Glucose Monitor Brands: per insurance. (Patient not taking: Reported on 8/6/2021) 1 Bottle 3    blood glucose monitoring (NO BRAND SPECIFIED) meter device kit Use to test blood sugar 1 times daily or as directed. Preferred blood glucose meter OR supplies to accompany: Blood Glucose Monitor Brands: per insurance. (Patient not taking: Reported on 8/6/2021) 1 kit 0    clobetasol (TEMOVATE) 0.05 % cream Small amount to affected areas BID (Patient not taking: Reported on 9/20/2023) 60 g 1    desoximetasone (TOPICORT) 0.25 % cream use as directed to legs (Patient not taking: Reported on 9/20/2023) 100 g 1    thin (NO BRAND SPECIFIED) lancets Use with lanceting device. To accompany: Blood Glucose Monitor Brands: per insurance. (Patient not taking: Reported on 9/20/2023) 100 each 6       Allergies   Allergen Reactions    Norvasc [Amlodipine] Muscle Pain (Myalgia)    Simvastatin Rash     Rash and edema        Social History     Tobacco Use    Smoking status: Never    Smokeless tobacco: Never   Substance Use Topics    Alcohol use: Yes     Alcohol/week: 0.0 standard drinks of alcohol     Comment: occasional     Family History   Problem Relation Age of Onset    Cancer Mother         Lung Cancer    Cancer - colorectal Father         Stomach Cancer    Cancer Brother      History   Drug Use No         Objective     /78 (BP Location: Right arm, Patient Position: Chair)   Pulse 64   Temp 97  F (36.1  C) (Temporal)   Resp 18   Ht 1.727 m (5' 8\")   Wt 134.8 kg (297 lb 1.6 oz)   SpO2 98%   BMI 45.17 kg/m      Physical Exam    GENERAL APPEARANCE: healthy, alert and no distress     EYES: Ocular examination is deferred to the expertise of the " patient's ophthalmologist     HENT: ear canals and TM's normal and nose and mouth without ulcers or lesions     NECK: no adenopathy, no asymmetry, masses, or scars and thyroid normal to palpation     RESP: lungs clear to auscultation - no rales, rhonchi or wheezes     CV: regular rates and rhythm, normal S1 S2, no S3 or S4 and no murmur, click or rub     ABDOMEN:  soft, nontender, no HSM or masses and bowel sounds normal     MS: extremities normal- no gross deformities noted, no evidence of inflammation in joints, FROM in all extremities.     SKIN: no suspicious lesions or rashes     NEURO: Normal strength and tone, sensory exam grossly normal, mentation intact and speech normal     PSYCH: mentation appears normal. and affect normal/bright     LYMPHATICS: No cervical adenopathy    Recent Labs   Lab Test 08/10/22  0928      POTASSIUM 4.1   CR 0.96   A1C 6.2*        Diagnostics:  Labs pending at this time.  Results will be reviewed when available.   No EKG required for low risk surgery (cataract, skin procedure, breast biopsy, etc).    Revised Cardiac Risk Index (RCRI):  The patient has the following serious cardiovascular risks for perioperative complications:   - No serious cardiac risks = 0 points     RCRI Interpretation: 0 points: Class I (very low risk - 0.4% complication rate)         Signed Electronically by: Mina Walters DO  Copy of this evaluation report is provided to requesting physician.

## 2023-09-20 NOTE — LETTER
September 26, 2023      Ben Ceron  [address] [facility] [address]        Dear ,    We are writing to inform you of your test results.    Chemistry panel shows a slightly elevated blood sugar 121 and normal kidney function.   Cholesterol level slightly elevated with an LDL of 107.  Would recommend limiting fatty foods in the diet as well as continuing your Zetia.   The glycosylated hemoglobin is within acceptable limits suggesting adequate blood sugar control.   The microalbumin is within acceptable limits.  Minimal protein loss is noted in the urine.       You will be contacted with any outstanding results as they are available.   Feel free to contact me via the office or My Chart if you have any questions regarding the above.     Resulted Orders   HEMOGLOBIN A1C   Result Value Ref Range    Hemoglobin A1C 6.3 (H) <5.7 %      Comment:      Normal <5.7%   Prediabetes 5.7-6.4%    Diabetes 6.5% or higher     Note: Adopted from ADA consensus guidelines.   BASIC METABOLIC PANEL   Result Value Ref Range    Sodium 138 136 - 145 mmol/L    Potassium 4.4 3.4 - 5.3 mmol/L    Chloride 101 98 - 107 mmol/L    Carbon Dioxide (CO2) 28 22 - 29 mmol/L    Anion Gap 9 7 - 15 mmol/L    Urea Nitrogen 24.1 (H) 8.0 - 23.0 mg/dL    Creatinine 0.98 0.67 - 1.17 mg/dL    Calcium 9.6 8.8 - 10.2 mg/dL    Glucose 121 (H) 70 - 99 mg/dL    GFR Estimate 80 >60 mL/min/1.73m2   Lipid panel reflex to direct LDL Non-fasting   Result Value Ref Range    Cholesterol 189 <200 mg/dL    Triglycerides 154 (H) <150 mg/dL    Direct Measure HDL 51 >=40 mg/dL    LDL Cholesterol Calculated 107 (H) <=100 mg/dL    Non HDL Cholesterol 138 (H) <130 mg/dL    Narrative    Cholesterol  Desirable:  <200 mg/dL    Triglycerides  Normal:  Less than 150 mg/dL  Borderline High:  150-199 mg/dL  High:  200-499 mg/dL  Very High:  Greater than or equal to 500 mg/dL    Direct Measure HDL  Female:  Greater than or equal to 50 mg/dL   Male:  Greater than or equal to 40  mg/dL    LDL Cholesterol  Desirable:  <100mg/dL  Above Desirable:  100-129 mg/dL   Borderline High:  130-159 mg/dL   High:  160-189 mg/dL   Very High:  >= 190 mg/dL    Non HDL Cholesterol  Desirable:  130 mg/dL  Above Desirable:  130-159 mg/dL  Borderline High:  160-189 mg/dL  High:  190-219 mg/dL  Very High:  Greater than or equal to 220 mg/dL   Albumin Random Urine Quantitative with Creat Ratio   Result Value Ref Range    Creatinine Urine mg/dL 175.0 mg/dL      Comment:      The reference ranges have not been established in urine creatinine. The results should be integrated into the clinical context for interpretation.    Albumin Urine mg/L 17.9 mg/L      Comment:      The reference ranges have not been established in urine albumin. The results should be integrated into the clinical context for interpretation.    Albumin Urine mg/g Cr 10.23 0.00 - 17.00 mg/g Cr      Comment:      Microalbuminuria is defined as an albumin:creatinine ratio of 17 to 299 for males and 25 to 299 for females. A ratio of albumin:creatinine of 300 or higher is indicative of overt proteinuria.  Due to biologic variability, positive results should be confirmed by a second, first-morning random or 24-hour timed urine specimen. If there is discrepancy, a third specimen is recommended. When 2 out of 3 results are in the microalbuminuria range, this is evidence for incipient nephropathy and warrants increased efforts at glucose control, blood pressure control, and institution of therapy with an angiotensin-converting-enzyme (ACE) inhibitor (if the patient can tolerate it).         If you have any questions or concerns, please call the clinic at the number listed above.       Sincerely,      Mina Walters DO

## 2023-09-20 NOTE — PROGRESS NOTES
[address] [address]  Phone: [phone]  Primary Provider: Mina Walters  Pre-op Performing Provider: MINA WALTERS      PREOPERATIVE EVALUATION:  Today's date: 9/20/2023    Ben is a 77 year old male who presents for a preoperative evaluation.      9/20/2023     9:49 AM   Additional Questions   Roomed by Zuleima SANON       Surgical Information:  Surgery/Procedure: Phacoemulsification with standard intraocular lens implant   Surgery Location: [facility]  Surgeon: Dr. Ardon  Surgery Date: 10/05/2023  Time of Surgery: 9:45  Where patient plans to recover: At home with family  Fax number for surgical facility: Note does not need to be faxed, will be available electronically in Epic.    Assessment & Plan     The proposed surgical procedure is considered LOW risk.    Preop general physical exam      Age-related cataract of both eyes, unspecified age-related cataract type      Controlled type 2 diabetes mellitus without complication, without long-term current use of insulin (H)    - HEMOGLOBIN A1C; Future  - Albumin Random Urine Quantitative with Creat Ratio; Future  - metFORMIN (GLUCOPHAGE) 500 MG tablet; Take 1 tablet (500 mg) by mouth 2 times daily (with meals)    Essential hypertension with goal blood pressure less than 140/90    - BASIC METABOLIC PANEL; Future  - losartan-hydrochlorothiazide (HYZAAR) 100-25 MG tablet; Take 1 tablet by mouth daily  - metoprolol succinate ER (TOPROL XL) 100 MG 24 hr tablet; Take 1 tablet (100 mg) by mouth daily  - NIFEdipine ER (ADALAT CC) 60 MG 24 hr tablet; Take 1 tablet (60 mg) by mouth daily    Hyperlipidemia LDL goal <130    - Lipid panel reflex to direct LDL Non-fasting; Future  - ezetimibe (ZETIA) 10 MG tablet; Take 1 tablet (10 mg) by mouth daily    Benign prostatic hyperplasia with urinary frequency    - finasteride (PROSCAR) 5 MG tablet; Take 1 tablet (5 mg) by  mouth daily    Need for influenza vaccination    - INFLUENZA VACCINE 65+ (FLUZONE HD)    Screen for colon cancer    - Colonoscopy Screening  Referral; Future            - No identified additional risk factors other than previously addressed    Antiplatelet or Anticoagulation Medication Instructions:   - aspirin: Bleeding risk is low for this procedure and daily aspirin may be continued without modification.     Additional Medication Instructions:  patient is to hold metformin the morning of the procedure.    RECOMMENDATION:  APPROVAL GIVEN to proceed with proposed procedure, without further diagnostic evaluation.            Subjective       HPI related to upcoming procedure: Phacoemulsification with standard intraocular lens implant         9/20/2023     9:29 AM   Preop Questions   1. Have you ever had a heart attack or stroke? No   2. Have you ever had surgery on your heart or blood vessels, such as a stent placement, a coronary artery bypass, or surgery on an artery in your head, neck, heart, or legs? No   3. Do you have chest pain with activity? No   4. Do you have a history of  heart failure? No   5. Do you currently have a cold, bronchitis or symptoms of other infection? No   6. Do you have a cough, shortness of breath, or wheezing? No   7. Do you or anyone in your family have previous history of blood clots? No   8. Do you or does anyone in your family have a serious bleeding problem such as prolonged bleeding following surgeries or cuts? No   9. Have you ever had problems with anemia or been told to take iron pills? No   10. Have you had any abnormal blood loss such as black, tarry or bloody stools? No   11. Have you ever had a blood transfusion? No   12. Are you willing to have a blood transfusion if it is medically needed before, during, or after your surgery? Yes   13. Have you or any of your relatives ever had problems with anesthesia? No   14. Do you have sleep apnea, excessive snoring or  daytime drowsiness? YES - sleep apnea   14a. Do you have a CPAP machine? Yes   15. Do you have any artifical heart valves or other implanted medical devices like a pacemaker, defibrillator, or continuous glucose monitor? No   16. Do you have artificial joints? No   17. Are you allergic to latex? No       Health Care Directive:  Patient has a Health Care Directive on file      Preoperative Review of :   reviewed - no record of controlled substances prescribed.          Review of Systems  CONSTITUTIONAL: NEGATIVE for fever, chills, change in weight  INTEGUMENTARY/SKIN: NEGATIVE for worrisome rashes, moles or lesions  EYES: Decreased visual acuity secondary to the presence of bilateral cataract  ENT/MOUTH: NEGATIVE for ear, mouth and throat problems  RESP: NEGATIVE for significant cough or SOB  CV: NEGATIVE for chest pain, palpitations or peripheral edema  GI: NEGATIVE for nausea, abdominal pain, heartburn, or change in bowel habits  : NEGATIVE for frequency, dysuria, or hematuria  MUSCULOSKELETAL: NEGATIVE for significant arthralgias or myalgia  NEURO: NEGATIVE for weakness, dizziness or paresthesias  ENDOCRINE: NEGATIVE for temperature intolerance, skin/hair changes  HEME: NEGATIVE for bleeding problems  PSYCHIATRIC: NEGATIVE for changes in mood or affect    Patient Active Problem List    Diagnosis Date Noted    Type 2 diabetes mellitus with other specified complication, without long-term current use of insulin (H) 08/10/2022     Priority: Medium    Strain of right biceps muscle, subsequent encounter 09/21/2018     Priority: Medium    S/P arthroscopy of right knee 08/13/2018     Priority: Medium    Primary osteoarthritis of right knee 08/01/2018     Priority: Medium    Complex tear of medial meniscus of right knee as current injury, initial encounter 08/01/2018     Priority: Medium    Benign prostatic hyperplasia with urinary frequency 04/27/2018     Priority: Medium    Hyperlipidemia LDL goal <100 05/03/2017      Priority: Medium    Primary osteoarthritis of left knee 12/15/2016     Priority: Medium    Benign non-nodular prostatic hyperplasia 05/31/2016     Priority: Medium    Morbid obesity with BMI of 45.0-49.9, adult (H) 04/04/2016     Priority: Medium    Family history of colon cancer 04/04/2016     Priority: Medium     brother with colonoscopy      Rash 04/04/2016     Priority: Medium    LUCAS (obstructive sleep apnea) 04/04/2016     Priority: Medium    Essential hypertension with goal blood pressure less than 140/90 08/27/2013     Priority: Medium    Advance Care Planning 11/18/2011     Priority: Medium     Advance Care Planning: Receipt of ACP document:  Received: Health Care Directive which was witnessed or notarized on 12-22-10.  Document previously scanned on 11-15-11.  Validation form completed and sent to be scanned.  Code Status should state FULL code.  Confirmed/documented designated decision maker(s). See permanent comments section of demographics in clinical tab. View document(s) and details by clicking on code status. Added by Meena Prescott RN, System ACP Coordinator [facility] on 12/3/2014.  11/18/11Received outside Health Care Directive. Previously signed by patient and notary on 12/22/2010.  scanned and placed behind media tab on 11/15/11.  Please see Health Care Directive for specifics. Code status updated...Marylu Cruz RN          Past Medical History:   Diagnosis Date    Benign non-nodular prostatic hyperplasia, presence of lower urinary tract symptoms unspecified 5/31/2016    Colonic polyps     Family history of colon cancer 4/4/2016    brother with colonoscopy     Hyperlipidemia LDL goal <100 5/3/2017    Hyperlipidemia LDL goal <130 12/8/2016    LUCAS (obstructive sleep apnea) 4/4/2016    Rash 4/4/2016     Past Surgical History:   Procedure Laterality Date    ARTHROSCOPY KNEE WITH MEDIAL MENISCECTOMY  6/6/2014    Procedure: ARTHROSCOPY KNEE WITH MEDIAL MENISCECTOMY;  Surgeon: Alberto  Ramana Robins DO;  Location: [facility]    ARTHROSCOPY KNEE WITH MEDIAL MENISCECTOMY Right 8/3/2018    Procedure: ARTHROSCOPY KNEE WITH MEDIAL MENISCECTOMY;  Right knee arthroscopy with partial meniscectomy;  Surgeon: Ramana Dominguez DO;  Location: [facility] OR    COLONOSCOPY  11/29/2011    Polypectomy.    COLONOSCOPY N/A 11/12/2014    Procedure: COLONOSCOPY;  Surgeon: Brooks Burris MD;  Location: [facility]    COLONOSCOPY N/A 9/25/2020    Procedure: Colonoscopy with  polypectomy by biopsy;  Surgeon: Brooks Burris MD;  Location: [facility]    HC VASECTOMY UNILAT/BILAT W POSTOP SEMEN  1977    Vasectomy    [facility] COLONOSCOPY W BIOPSY  11/24/08    [facility] COLONOSCOPY W/WO BRUSH/WASH  11/21/2005    Polypectomy.     Current Outpatient Medications   Medication Sig Dispense Refill    Acetaminophen (TYLENOL PO) As needed      aspirin 81 MG tablet Take by mouth daily 30 tablet     Cholecalciferol (VITAMIN D) 2000 UNITS tablet Take 1 tablet by mouth daily.      ezetimibe (ZETIA) 10 MG tablet Take 1 tablet (10 mg) by mouth daily 90 tablet 1    finasteride (PROSCAR) 5 MG tablet Take 1 tablet (5 mg) by mouth daily 90 tablet 3    losartan-hydrochlorothiazide (HYZAAR) 100-25 MG tablet Take 1 tablet by mouth daily 90 tablet 3    metFORMIN (GLUCOPHAGE) 500 MG tablet Take 1 tablet (500 mg) by mouth 2 times daily (with meals) 180 tablet 3    metoprolol succinate ER (TOPROL XL) 100 MG 24 hr tablet Take 1 tablet (100 mg) by mouth daily 90 tablet 3    NIFEdipine ER (ADALAT CC) 60 MG 24 hr tablet Take 1 tablet (60 mg) by mouth daily 90 tablet 3    order for DME Equipment being ordered: CPAP and related hardware, mask and tubing as well heated humidity equipment. 1 Units 0    VITAMIN E 400 UNIT OR CAPS   0    ADVIL 200 MG OR TABS Reported on 5/2/2017 (Patient not taking: Reported on 9/20/2023) 120 0    alcohol swab prep pads Use to swab area of injection/federica as directed. (Patient not taking: Reported on 9/20/2023) 100 each 3    blood  "glucose (NO BRAND SPECIFIED) test strip Use to test blood sugar 1 times daily or as directed. To accompany: Blood Glucose Monitor Brands: per insurance. (Patient not taking: Reported on 8/6/2021) 100 strip 6    blood glucose calibration (NO BRAND SPECIFIED) solution To accompany: Blood Glucose Monitor Brands: per insurance. (Patient not taking: Reported on 8/6/2021) 1 Bottle 3    blood glucose monitoring (NO BRAND SPECIFIED) meter device kit Use to test blood sugar 1 times daily or as directed. Preferred blood glucose meter OR supplies to accompany: Blood Glucose Monitor Brands: per insurance. (Patient not taking: Reported on 8/6/2021) 1 kit 0    clobetasol (TEMOVATE) 0.05 % cream Small amount to affected areas BID (Patient not taking: Reported on 9/20/2023) 60 g 1    desoximetasone (TOPICORT) 0.25 % cream use as directed to legs (Patient not taking: Reported on 9/20/2023) 100 g 1    thin (NO BRAND SPECIFIED) lancets Use with lanceting device. To accompany: Blood Glucose Monitor Brands: per insurance. (Patient not taking: Reported on 9/20/2023) 100 each 6       Allergies   Allergen Reactions    Norvasc [Amlodipine] Muscle Pain (Myalgia)    Simvastatin Rash     Rash and edema        Social History     Tobacco Use    Smoking status: Never    Smokeless tobacco: Never   Substance Use Topics    Alcohol use: Yes     Alcohol/week: 0.0 standard drinks of alcohol     Comment: occasional     Family History   Problem Relation Age of Onset    Cancer Mother         Lung Cancer    Cancer - colorectal Father         Stomach Cancer    Cancer Brother      History   Drug Use No         Objective     /78 (BP Location: Right arm, Patient Position: Chair)   Pulse 64   Temp 97  F (36.1  C) (Temporal)   Resp 18   Ht 1.727 m (5' 8\")   Wt 134.8 kg (297 lb 1.6 oz)   SpO2 98%   BMI 45.17 kg/m      Physical Exam    GENERAL APPEARANCE: healthy, alert and no distress     EYES: Ocular examination is deferred to the expertise of the " patient's ophthalmologist     HENT: ear canals and TM's normal and nose and mouth without ulcers or lesions     NECK: no adenopathy, no asymmetry, masses, or scars and thyroid normal to palpation     RESP: lungs clear to auscultation - no rales, rhonchi or wheezes     CV: regular rates and rhythm, normal S1 S2, no S3 or S4 and no murmur, click or rub     ABDOMEN:  soft, nontender, no HSM or masses and bowel sounds normal     MS: extremities normal- no gross deformities noted, no evidence of inflammation in joints, FROM in all extremities.     SKIN: no suspicious lesions or rashes     NEURO: Normal strength and tone, sensory exam grossly normal, mentation intact and speech normal     PSYCH: mentation appears normal. and affect normal/bright     LYMPHATICS: No cervical adenopathy    Recent Labs   Lab Test 08/10/22  0928      POTASSIUM 4.1   CR 0.96   A1C 6.2*        Diagnostics:  Labs pending at this time.  Results will be reviewed when available.   No EKG required for low risk surgery (cataract, skin procedure, breast biopsy, etc).    Revised Cardiac Risk Index (RCRI):  The patient has the following serious cardiovascular risks for perioperative complications:   - No serious cardiac risks = 0 points     RCRI Interpretation: 0 points: Class I (very low risk - 0.4% complication rate)         Signed Electronically by: Mina Walters DO  Copy of this evaluation report is provided to requesting physician.

## 2023-09-22 ENCOUNTER — TELEPHONE (OUTPATIENT)
Dept: GASTROENTEROLOGY | Facility: CLINIC | Age: 78
End: 2023-09-22
Payer: COMMERCIAL

## 2023-09-22 NOTE — TELEPHONE ENCOUNTER
"Endoscopy Scheduling Screen    Have you had a positive Covid test in the last 14 days?  No    Are you active on MyChart?   No    What insurance is in the chart?  Other:  BC[employer]    Ordering/Referring Provider: KELLI RODRIGUEZ    (If ordering provider performs procedure, schedule with ordering provider unless otherwise instructed. )    BMI: Estimated body mass index is 45.17 kg/m  as calculated from the following:    Height as of 9/20/23: 1.727 m (5' 8\").    Weight as of 9/20/23: 134.8 kg (297 lb 1.6 oz).     Sedation Ordered  moderate sedation.   If patient BMI > 50 do not schedule in [facility].    If patient BMI > 45 do not schedule at [facility].    Are you taking methadone or Suboxone?  No    Are you taking any prescription medications for pain 3 or more times per week?   No    Do you have a history of malignant hyperthermia or adverse reaction to anesthesia?  No    (Females) Are you currently pregnant?        Have you been diagnosed or told you have pulmonary hypertension?   No    Do you have an LVAD?  No    Have you been told you have moderate to severe sleep apnea?  Yes (RN Review required for scheduling unless scheduling in Hospital.)    Have you been told you have COPD, asthma, or any other lung disease?  No    Do you have any heart conditions?  No     Have you ever had an organ transplant?   No    Have you ever had or are you awaiting a heart or lung transplant?   No    Have you had a stroke or transient ischemic attack (TIA aka \"mini stroke\" in the last 6 months?   No    Have you been diagnosed with or been told you have cirrhosis of the liver?   No    Are you currently on dialysis?   No    Do you need assistance transferring?   No    BMI: Estimated body mass index is 45.17 kg/m  as calculated from the following:    Height as of 9/20/23: 1.727 m (5' 8\").    Weight as of 9/20/23: 134.8 kg (297 lb 1.6 oz).     Is patients BMI > 40 and scheduling location [facility]?  No    Do you take an injectable medication for " weight loss or diabetes (excluding insulin)?  No    Do you take the medication Naltrexone?  No    Do you take blood thinners?  No       Prep   Are you currently on dialysis or do you have chronic kidney disease?  No    Do you have a diagnosis of diabetes?  Yes (Golytely Prep)    Do you have a diagnosis of cystic fibrosis (CF)?  No    On a regular basis do you go 3 -5 days between bowel movements?  No    BMI > 40?  Yes (Extended Prep)    Preferred Pharmacy:    [id number] - [place], [place] - [address]  [address] [facility] [address]  Phone: [phone] Fax: [phone]    [facility] [place], [place] - [address]  [address]  [facility] [address]  Phone: [phone] Fax: [phone]    [facility] [address] [place], [place] - [address]  [address]  [facility] [address]  Phone: [phone] Fax: [phone]    [facility] [address] [place], [place] - [address]  [address]  [facility] [address]  Phone: [phone] Fax: [phone]      Final Scheduling Details   Colonoscopy prep sent?  Golytely Extended Prep    Procedure scheduled  Colonoscopy    Surgeon:  GARTH     Date of procedure:  11/8     Pre-OP / PAC:   No - Not required for this site.    Location  [facility] - Per order.    Sedation   MAC/Deep Sedation  [facility]      Patient Reminders:   You will receive a call from a Nurse to review instructions and health history.  This assessment must be completed prior to your procedure.  Failure to complete the Nurse assessment may result in the procedure being cancelled.      On the day of your procedure, please designate an adult(s) who can drive you home stay with you for the next 24 hours. The medicines used in the exam will make you sleepy. You will not be able to drive.      You cannot take public transportation, ride share services, or non-medical taxi service without a responsible caregiver.  Medical transport services are allowed  with the requirement that a responsible caregiver will receive you at your destination.  We require that drivers and caregivers are confirmed prior to your procedure.

## 2023-10-05 ENCOUNTER — ANESTHESIA EVENT (OUTPATIENT)
Dept: SURGERY | Facility: CLINIC | Age: 78
End: 2023-10-05
Payer: MEDICARE

## 2023-10-05 ENCOUNTER — HOSPITAL ENCOUNTER (OUTPATIENT)
Facility: CLINIC | Age: 78
Discharge: HOME | End: 2023-10-05
Attending: STUDENT IN AN ORGANIZED HEALTH CARE EDUCATION/TRAINING PROGRAM | Admitting: STUDENT IN AN ORGANIZED HEALTH CARE EDUCATION/TRAINING PROGRAM
Payer: MEDICARE

## 2023-10-05 ENCOUNTER — ANESTHESIA (OUTPATIENT)
Dept: SURGERY | Facility: CLINIC | Age: 78
End: 2023-10-05
Payer: MEDICARE

## 2023-10-05 VITALS
RESPIRATION RATE: 18 BRPM | HEART RATE: 67 BPM | OXYGEN SATURATION: 96 % | TEMPERATURE: 97.7 F | SYSTOLIC BLOOD PRESSURE: 165 MMHG | DIASTOLIC BLOOD PRESSURE: 83 MMHG

## 2023-10-05 LAB — GLUCOSE BLDC GLUCOMTR-MCNC: 127 MG/DL (ref 70–99)

## 2023-10-05 PROCEDURE — V2632 POST CHMBR INTRAOCULAR LENS: HCPCS | Performed by: STUDENT IN AN ORGANIZED HEALTH CARE EDUCATION/TRAINING PROGRAM

## 2023-10-05 PROCEDURE — 370N000004 HC ANESTHESIA CATARACT PACKAGE: Performed by: STUDENT IN AN ORGANIZED HEALTH CARE EDUCATION/TRAINING PROGRAM

## 2023-10-05 PROCEDURE — 250N000009 HC RX 250: Performed by: STUDENT IN AN ORGANIZED HEALTH CARE EDUCATION/TRAINING PROGRAM

## 2023-10-05 PROCEDURE — 761N000008 HC RECOVERY CATRACT PACKAGE: Performed by: STUDENT IN AN ORGANIZED HEALTH CARE EDUCATION/TRAINING PROGRAM

## 2023-10-05 PROCEDURE — 250N000011 HC RX IP 250 OP 636: Mod: JZ | Performed by: STUDENT IN AN ORGANIZED HEALTH CARE EDUCATION/TRAINING PROGRAM

## 2023-10-05 PROCEDURE — 250N000011 HC RX IP 250 OP 636: Performed by: NURSE ANESTHETIST, CERTIFIED REGISTERED

## 2023-10-05 PROCEDURE — 82962 GLUCOSE BLOOD TEST: CPT

## 2023-10-05 PROCEDURE — 360N000007 HC CATARACT SURGICAL PACKAGE: Performed by: STUDENT IN AN ORGANIZED HEALTH CARE EDUCATION/TRAINING PROGRAM

## 2023-10-05 DEVICE — EYE IMP IOL AMO PCL TECNIS ZCB00 21.0: Type: IMPLANTABLE DEVICE | Site: EYE | Status: FUNCTIONAL

## 2023-10-05 RX ORDER — PROPARACAINE HYDROCHLORIDE 5 MG/ML
1 SOLUTION/ DROPS OPHTHALMIC ONCE
Status: COMPLETED | OUTPATIENT
Start: 2023-10-05 | End: 2023-10-05

## 2023-10-05 RX ORDER — PROPARACAINE HYDROCHLORIDE 5 MG/ML
1 SOLUTION/ DROPS OPHTHALMIC ONCE
Status: DISCONTINUED | OUTPATIENT
Start: 2023-10-05 | End: 2023-10-05 | Stop reason: HOSPADM

## 2023-10-05 RX ORDER — DICLOFENAC SODIUM 1 MG/ML
1 SOLUTION/ DROPS OPHTHALMIC
Status: COMPLETED | OUTPATIENT
Start: 2023-10-05 | End: 2023-10-05

## 2023-10-05 RX ORDER — CYCLOPENTOLATE HYDROCHLORIDE 10 MG/ML
1 SOLUTION/ DROPS OPHTHALMIC
Status: COMPLETED | OUTPATIENT
Start: 2023-10-05 | End: 2023-10-05

## 2023-10-05 RX ORDER — MOXIFLOXACIN 5 MG/ML
1 SOLUTION/ DROPS OPHTHALMIC
Status: COMPLETED | OUTPATIENT
Start: 2023-10-05 | End: 2023-10-05

## 2023-10-05 RX ORDER — ONDANSETRON 4 MG/1
4 TABLET, ORALLY DISINTEGRATING ORAL EVERY 30 MIN PRN
Status: CANCELLED | OUTPATIENT
Start: 2023-10-05

## 2023-10-05 RX ORDER — PREDNISOLONE/MOXIFLO/NEPAFENAC 1-0.5-0.1%
SUSPENSION, DROPS(FINAL DOSAGE FORM)(ML) OPHTHALMIC (EYE) PRN
Status: DISCONTINUED | OUTPATIENT
Start: 2023-10-05 | End: 2023-10-05 | Stop reason: HOSPADM

## 2023-10-05 RX ORDER — PHENYLEPHRINE HYDROCHLORIDE 25 MG/ML
1 SOLUTION/ DROPS OPHTHALMIC
Status: COMPLETED | OUTPATIENT
Start: 2023-10-05 | End: 2023-10-05

## 2023-10-05 RX ORDER — ONDANSETRON 2 MG/ML
4 INJECTION INTRAMUSCULAR; INTRAVENOUS EVERY 30 MIN PRN
Status: CANCELLED | OUTPATIENT
Start: 2023-10-05

## 2023-10-05 RX ORDER — FENTANYL CITRATE 50 UG/ML
INJECTION, SOLUTION INTRAMUSCULAR; INTRAVENOUS PRN
Status: DISCONTINUED | OUTPATIENT
Start: 2023-10-05 | End: 2023-10-05

## 2023-10-05 RX ORDER — LIDOCAINE 40 MG/G
CREAM TOPICAL
Status: DISCONTINUED | OUTPATIENT
Start: 2023-10-05 | End: 2023-10-05 | Stop reason: HOSPADM

## 2023-10-05 RX ADMIN — CYCLOPENTOLATE HYDROCHLORIDE 1 DROP: 10 SOLUTION/ DROPS OPHTHALMIC at 08:46

## 2023-10-05 RX ADMIN — FENTANYL CITRATE 50 MCG: 50 INJECTION INTRAMUSCULAR; INTRAVENOUS at 09:53

## 2023-10-05 RX ADMIN — MOXIFLOXACIN 1 DROP: 5 SOLUTION/ DROPS OPHTHALMIC at 08:29

## 2023-10-05 RX ADMIN — MOXIFLOXACIN 1 DROP: 5 SOLUTION/ DROPS OPHTHALMIC at 08:46

## 2023-10-05 RX ADMIN — PHENYLEPHRINE HYDROCHLORIDE 1 DROP: 25 SOLUTION/ DROPS OPHTHALMIC at 08:34

## 2023-10-05 RX ADMIN — CYCLOPENTOLATE HYDROCHLORIDE 1 DROP: 10 SOLUTION/ DROPS OPHTHALMIC at 08:34

## 2023-10-05 RX ADMIN — PROPARACAINE HYDROCHLORIDE 1 DROP: 5 SOLUTION/ DROPS OPHTHALMIC at 08:28

## 2023-10-05 RX ADMIN — MIDAZOLAM 1 MG: 1 INJECTION INTRAMUSCULAR; INTRAVENOUS at 09:53

## 2023-10-05 RX ADMIN — PHENYLEPHRINE HYDROCHLORIDE 1 DROP: 25 SOLUTION/ DROPS OPHTHALMIC at 08:28

## 2023-10-05 RX ADMIN — CYCLOPENTOLATE HYDROCHLORIDE 1 DROP: 10 SOLUTION/ DROPS OPHTHALMIC at 08:29

## 2023-10-05 RX ADMIN — DICLOFENAC SODIUM 1 DROP: 1 SOLUTION/ DROPS OPHTHALMIC at 08:47

## 2023-10-05 RX ADMIN — MOXIFLOXACIN 1 DROP: 5 SOLUTION/ DROPS OPHTHALMIC at 08:34

## 2023-10-05 RX ADMIN — DICLOFENAC SODIUM 1 DROP: 1 SOLUTION/ DROPS OPHTHALMIC at 08:46

## 2023-10-05 RX ADMIN — DICLOFENAC SODIUM 1 DROP: 1 SOLUTION/ DROPS OPHTHALMIC at 08:29

## 2023-10-05 RX ADMIN — PHENYLEPHRINE HYDROCHLORIDE 1 DROP: 25 SOLUTION/ DROPS OPHTHALMIC at 08:45

## 2023-10-05 ASSESSMENT — ACTIVITIES OF DAILY LIVING (ADL): ADLS_ACUITY_SCORE: 35

## 2023-10-05 NOTE — ANESTHESIA CARE TRANSFER NOTE
Patient: Ben Ceron    Procedure: Procedure(s):  Phacoemulsification with standard intraocular lens implant left       Diagnosis: Cataract [H26.9]  Diagnosis Additional Information: No value filed.    Anesthesia Type:   MAC     Note:    Oropharynx: oropharynx clear of all foreign objects and spontaneously breathing  Level of Consciousness: drowsy and awake  Oxygen Supplementation: room air    Independent Airway: airway patency satisfactory and stable  Dentition: dentition unchanged  Vital Signs Stable: post-procedure vital signs reviewed and stable  Report to RN Given: handoff report given  Patient transferred to: Phase II    Handoff Report: Identifed the Patient, Identified the Reponsible Provider, Reviewed the pertinent medical history, Discussed the surgical course, Reviewed Intra-OP anesthesia mangement and issues during anesthesia, Set expectations for post-procedure period and Allowed opportunity for questions and acknowledgement of understanding      Vitals:  Vitals Value Taken Time   /90 10/05/23 1038   Temp 97.7  F (36.5  C) 10/05/23 1020   Pulse 63 10/05/23 1038   Resp 18 10/05/23 1020   SpO2 96 % 10/05/23 1038   Vitals shown include unvalidated device data.    Electronically Signed By: NELLY Hurd CRNA  October 5, 2023  11:06 AM

## 2023-10-05 NOTE — DISCHARGE INSTRUCTIONS
POST CATARACT SURGERY EYE INSTRUCTIONS             Eye Medications    VIGAMOX/OFLOXACIN (Tan Cap)    KETOROLAC (Carrillo Cap)    PREDNISOLONE (Pink or White Cap)    If you opted for the individual bottles of eye medications follow these instructions.    *Instill one drop from each bottle into the operative eye 3 times a day until each  bottle is empty.    *Wait 5 minutes between each drop.    If you opted for the one bottle containing all 3 eye medications, follow these instructions.    *Instill one drop into the operative eye 3 times a day until the bottle is empty.      If your are currently being treated for glaucoma please continue your medication as normally prescribed.      Wear eye shield while sleeping for 3 days    Refrain from heavy lifting, bending, straining, or strenuous activity for 1 week.    Do not rub or push on the operative eye for 1 week (you may wipe the eye lid(s) gently with a wet wash cloth to remove matter from eyelashes).    You may bathe or shower, but do not get the eye wet for 1 month (swimming, hot tubs or other water activities).    Minor itching, scratching sensation, burning sensation, etc. is normal.  Report any severe eye pain or loss of vision.      Bring all material and medications to the office on the first post op day.     Call your surgeon at [phone] or the answering service at [phone] for any problems, questions or concerns, especially pain.

## 2023-10-05 NOTE — INTERVAL H&P NOTE
"I have reviewed the surgical (or preoperative) H&P that is linked to this encounter, and examined the patient. There are no significant changes    Clinical Conditions Present on Arrival:  Clinically Significant Risk Factors Present on Admission                 # Drug Induced Platelet Defect: home medication list includes an antiplatelet medication  # Severe Obesity: Estimated body mass index is 45.17 kg/m  as calculated from the following:    Height as of 9/20/23: 1.727 m (5' 8\").    Weight as of 9/20/23: 134.8 kg (297 lb 1.6 oz).       "

## 2023-10-05 NOTE — ANESTHESIA PREPROCEDURE EVALUATION
Anesthesia Pre-Procedure Evaluation    Patient: Ben Ceron   MRN: [id number] : 1945        Procedure : Procedure(s):  Phacoemulsification with standard intraocular lens implant          Past Medical History:   Diagnosis Date     Benign non-nodular prostatic hyperplasia, presence of lower urinary tract symptoms unspecified 2016     Colonic polyps      Family history of colon cancer 2016    brother with colonoscopy      Hyperlipidemia LDL goal <100 5/3/2017     Hyperlipidemia LDL goal <130 2016     LUCAS (obstructive sleep apnea) 2016     Rash 2016      Past Surgical History:   Procedure Laterality Date     ARTHROSCOPY KNEE WITH MEDIAL MENISCECTOMY  2014    Procedure: ARTHROSCOPY KNEE WITH MEDIAL MENISCECTOMY;  Surgeon: Ramana Dominguez DO;  [facility]     ARTHROSCOPY KNEE WITH MEDIAL MENISCECTOMY Right 8/3/2018    Procedure: ARTHROSCOPY KNEE WITH MEDIAL MENISCECTOMY;  Right knee arthroscopy with partial meniscectomy;  Surgeon: Ramana Dominguez DO;  [facility]     COLONOSCOPY  2011    Polypectomy.     COLONOSCOPY N/A 2014    Procedure: COLONOSCOPY;  Surgeon: Brooks Burris MD;  [facility]     COLONOSCOPY N/A 2020    Procedure: Colonoscopy with  polypectomy by biopsy;  Surgeon: Brooks Burris MD;  [facility]     HC VASECTOMY UNILAT/BILAT W POSTOP SEMEN  1977    Vasectomy     [facility] COLONOSCOPY W BIOPSY  08     ZZ COLONOSCOPY W/WO BRUSH/WASH  2005    Polypectomy.      Allergies   Allergen Reactions     Norvasc [Amlodipine] Muscle Pain (Myalgia)     Simvastatin Rash     Rash and edema      Social History     Tobacco Use     Smoking status: Never     Smokeless tobacco: Never   Substance Use Topics     Alcohol use: Yes     Alcohol/week: 0.0 standard drinks of alcohol     Comment: occasional      Wt Readings from Last 1 Encounters:   23 134.8 kg (297 lb 1.6 oz)        Anesthesia Evaluation   Pt has had prior  anesthetic. Type: MAC and General.    No history of anesthetic complications       ROS/MED HX  ENT/Pulmonary:     (+) sleep apnea, uses CPAP,                                     Neurologic:  - neg neurologic ROS     Cardiovascular:     (+) Dyslipidemia hypertension- -   -  - -                                      METS/Exercise Tolerance: >4 METS    Hematologic:  - neg hematologic  ROS     Musculoskeletal:   (+)  arthritis,             GI/Hepatic:  - neg GI/hepatic ROS     Renal/Genitourinary:     (+)        BPH,      Endo:     (+)  type II DM, Last HgA1c: 6.3, date: 9/20/23,           Obesity,       Psychiatric/Substance Use:  - neg psychiatric ROS     Infectious Disease:  - neg infectious disease ROS     Malignancy:  - neg malignancy ROS     Other:  - neg other ROS          Physical Exam    Airway        Mallampati: III   TM distance: > 3 FB   Neck ROM: full   Mouth opening: > 3 cm    Respiratory Devices and Support         Dental  no notable dental history     (+) Modest Abnormalities - crowns, retainers, 1 or 2 missing teeth      Cardiovascular   cardiovascular exam normal       Rhythm and rate: regular and normal     Pulmonary   pulmonary exam normal        breath sounds clear to auscultation       OUTSIDE LABS:  CBC:   Lab Results   Component Value Date    WBC 8.5 08/06/2021    WBC 12.5 (H) 08/01/2018    HGB 14.3 08/06/2021    HGB 15.3 08/01/2018    HCT 41.3 08/06/2021    HCT 44.3 08/01/2018     08/06/2021     08/01/2018     BMP:   Lab Results   Component Value Date     09/20/2023     08/10/2022    POTASSIUM 4.4 09/20/2023    POTASSIUM 4.1 08/10/2022    CHLORIDE 101 09/20/2023    CHLORIDE 105 08/10/2022    CO2 28 09/20/2023    CO2 28 08/10/2022    BUN 24.1 (H) 09/20/2023    BUN 23 08/10/2022    CR 0.98 09/20/2023    CR 0.96 08/10/2022     (H) 09/20/2023     (H) 08/10/2022     COAGS: No results found for: PTT, INR, FIBR  POC:   Lab Results   Component Value Date      (H) 09/25/2020     HEPATIC:   Lab Results   Component Value Date    ALBUMIN 3.9 05/02/2017    PROTTOTAL 7.4 05/02/2017    ALT 28 05/02/2017    AST 12 05/02/2017    ALKPHOS 157 (H) 05/02/2017    BILITOTAL 0.3 05/02/2017     OTHER:   Lab Results   Component Value Date    A1C 6.3 (H) 09/20/2023    ALLEN 9.6 09/20/2023    TSH 1.15 05/08/2019       Anesthesia Plan    ASA Status:  3    NPO Status:  NPO Appropriate    Anesthesia Type: MAC.     - Reason for MAC: straight local not clinically adequate   Induction: N/a.   Maintenance: N/A.        Consents    Anesthesia Plan(s) and associated risks, benefits, and realistic alternatives discussed. Questions answered and patient/representative(s) expressed understanding.     - Discussed:     - Discussed with:  Patient      - Extended Intubation/Ventilatory Support Discussed: No.      - Patient is DNR/DNI Status: No     Use of blood products discussed: No .     Postoperative Care    Pain management: IV analgesics.        Comments:    Other Comments: The risks and benefits of anesthesia, and the alternatives where applicable, have been discussed with the patient, and they wish to proceed.          Lea Mcdaniels, NELLY CRNA

## 2023-10-05 NOTE — BRIEF OP NOTE
[facility]    Brief Operative Note    Pre-operative diagnosis: Cataract, Left EYe  Post-operative diagnosis Same as pre-operative diagnosis    Procedure: Phacoemulsification with standard intraocular lens implant left, Left - Eye    Surgeon: Surgeon(s) and Role:     * Ricardo Ardon MD - Primary  Anesthesia: MAC with Topical  Estimated Blood Loss: 0 mL from 10/5/2023  9:52 AM to 10/5/2023 10:20 AM      Drains: None  Specimens: * No specimens in log *  Findings:   None.  Complications: None.  Implants:   Implant Name Type Inv. Item Serial No.  Lot No. LRB No. Used Action   EYE IMP IOL ANUSHA PCL TECNIS ZCB00 21.0 - [id number] Lens/Eye Implant EYE IMP IOL ANUSHA PCL TECNIS ZCB00 21.0 [id number] [employer]  Left 1 Implanted

## 2023-10-05 NOTE — OP NOTE
[facility]  Ophthalmology Operative Note    PREOPERATIVE DIAGNOSIS: Cataract, Left eye.     POSTOPERATIVE DIAGNOSIS: Cataract, Left eye.     OPERATION: Cataract extraction with placement of posterior chamber intraocular lens in the Left eye.     ANESTHESIA: MAC combined with topical     INDICATIONS FOR PROCEDURE: Ben Ceron was seen in the [facility] for decreased visual acuity in the Left eye. The patient was found to have a visually significant cataract in the Left eye. The risks, benefits, alternatives and goals of cataract extraction were discussed with the patient, and after adequate discussion the patient understood and agreed to these, and a signed informed consent was obtained prior to the procedure.     DESCRIPTION OF PROCEDURE: After proper patient identification, topical anesthesia was applied to the Left eye. The patient was brought to the operating room and the Left eye was prepped and draped in the usual sterile fashion for intraocular surgery. A lid speculum was placed in the Left eye. A paracentesis was then created and the anterior chamber was filled with 1% non-preserved lidocaine followed by Endocoat. A clear corneal incision was then created temporally using a 2.4mm keratome. A continuous curvilinear capsulorrhexis was then created using a cystotome and Utrata forceps. Hydrodissection was carried out with BSS on a Sage cannula and the lens rotated freely within the capsular bag. Phacoemulsification was then carried out using the divide and conquer technique. Residual cortical material was removed using the I&A handpiece. The capsular bag was then filled with Healon and a ZCB00 +21.0 diopter intraocular lens was then injected into the capsular bag. The lens showed good centration and stability. Residual viscoelastic was removed using the I&A handpiece. The wound was then hydrated and the anterior chamber reformed. Intracameral Moxifloxacin was  then injected into the anterior chamber. The wounds were then checked and found to be sealed. Topical Prednisolone drops were placed in the patient's Left eye followed by a Bey shield over the top of this. The patient tolerated the procedure well without complications and was told to follow up in the clinic in the next postoperative day.     Implant Name Type Inv. Item Serial No.  Lot No. LRB No. Used Action   EYE IMP IOL ANUSHA PCL TECNIS ZCB00 21.0 - [id number] Lens/Eye Implant EYE IMP IOL ANUSHA PCL TECNIS ZCB00 21.0 [id number] [employer]  Left 1 Implanted        Ricardo Ardon MD

## 2023-10-05 NOTE — ANESTHESIA POSTPROCEDURE EVALUATION
Patient: Ben Ceron    Procedure: Procedure(s):  Phacoemulsification with standard intraocular lens implant left       Anesthesia Type:  MAC    Note:  Disposition: Outpatient   Postop Pain Control: Uneventful            Sign Out: Well controlled pain   PONV: No   Neuro/Psych: Uneventful            Sign Out: Acceptable/Baseline neuro status   Airway/Respiratory: Uneventful            Sign Out: Acceptable/Baseline resp. status   CV/Hemodynamics: Uneventful            Sign Out: Acceptable CV status   Other NRE: NONE   DID A NON-ROUTINE EVENT OCCUR? No    Event details/Postop Comments:  Pt was happy with anesthesia care.  No complications.  I will follow up with the pt if needed.           Last vitals:  Vitals Value Taken Time   /90 10/05/23 1038   Temp 97.7  F (36.5  C) 10/05/23 1020   Pulse 63 10/05/23 1038   Resp 18 10/05/23 1020   SpO2 96 % 10/05/23 1038   Vitals shown include unvalidated device data.    Electronically Signed By: NELLY Hurd CRNA  October 5, 2023  11:07 AM

## 2023-10-18 ENCOUNTER — ANESTHESIA EVENT (OUTPATIENT)
Dept: SURGERY | Facility: CLINIC | Age: 78
End: 2023-10-18
Payer: MEDICARE

## 2023-10-18 NOTE — ANESTHESIA PREPROCEDURE EVALUATION
Anesthesia Pre-Procedure Evaluation    Patient: Ben Ceron   MRN: [id number] : 1945        Procedure : Procedure(s):  Phacoemulsification with standard intraocular lens implant          Past Medical History:   Diagnosis Date    Benign non-nodular prostatic hyperplasia, presence of lower urinary tract symptoms unspecified 2016    Colonic polyps     Family history of colon cancer 2016    brother with colonoscopy     Hyperlipidemia LDL goal <100 5/3/2017    Hyperlipidemia LDL goal <130 2016    LUCAS (obstructive sleep apnea) 2016    Rash 2016      Past Surgical History:   Procedure Laterality Date    ARTHROSCOPY KNEE WITH MEDIAL MENISCECTOMY  2014    Procedure: ARTHROSCOPY KNEE WITH MEDIAL MENISCECTOMY;  Surgeon: Ramana Dominguez DO;  [facility]    ARTHROSCOPY KNEE WITH MEDIAL MENISCECTOMY Right 8/3/2018    Procedure: ARTHROSCOPY KNEE WITH MEDIAL MENISCECTOMY;  Right knee arthroscopy with partial meniscectomy;  Surgeon: Ramana Dominguez DO;  [facility]    COLONOSCOPY  2011    Polypectomy.    COLONOSCOPY N/A 2014    Procedure: COLONOSCOPY;  Surgeon: Brooks Burris MD;  [facility]    COLONOSCOPY N/A 2020    Procedure: Colonoscopy with  polypectomy by biopsy;  Surgeon: Brooks Burris MD;  [facility]    HC VASECTOMY UNILAT/BILAT W POSTOP SEMEN  1977    Vasectomy    PHACOEMULSIFICATION WITH STANDARD INTRAOCULAR LENS IMPLANT Left 10/5/2023    Procedure: Phacoemulsification with standard intraocular lens implant left;  Surgeon: Ricardo Ardon MD;  Location:  OR    ZZ COLONOSCOPY W BIOPSY  08    ZZHC COLONOSCOPY W/WO BRUSH/WASH  2005    Polypectomy.      Allergies   Allergen Reactions    Norvasc [Amlodipine] Muscle Pain (Myalgia)    Simvastatin Rash     Rash and edema      Social History     Tobacco Use    Smoking status: Never    Smokeless tobacco: Never   Substance Use Topics    Alcohol use: Yes      Alcohol/week: 0.0 standard drinks of alcohol     Comment: occasional      Wt Readings from Last 1 Encounters:   09/20/23 134.8 kg (297 lb 1.6 oz)        Anesthesia Evaluation   Pt has had prior anesthetic. Type: MAC and General.    No history of anesthetic complications       ROS/MED HX  ENT/Pulmonary:     (+) sleep apnea, uses CPAP,                                     Neurologic:  - neg neurologic ROS     Cardiovascular:     (+) Dyslipidemia hypertension- -   -  - -                                 Previous cardiac testing   Echo: Date: Results:    Stress Test:  Date: 8/16/2022 Results:  Reading Physician Reading Date Result Priority  Finn Ochoa MD  [phone] 8/18/2022 Routine  Finn Ochoa MD  [phone] 8/18/2022     Result Text        The nuclear stress test is negative for inducible myocardial ischemia or infarction. The left ventricular ejection fraction at stress is 73%. Left ventricular function is normal.     The patient's exercise capacity is mildly impaired.     There is no prior study for comparison.       ECG Summary    ECG Baseline electrocardiogram demonstrates sinus rhythm.   The stress electrocardiogram is negative for inducible ischemic EKG changes.  Arrhythmias during stress: Occasional PVCs.  There were no arrhythmias during recovery.  Technical Comments    Cardiac Protocol An exercise stress test was performed following a Rich protocol with the patient exercising for 4 minutes and 4 seconds under the supervision of Dr. Finn ochoa.  Heart rate demonstrated a normal response to exercise.  Blood pressure demonstrated a borderline hypertensive response to exercise.  The patient's exercise capacity is mildly impaired.  The test was stopped due to fatigue, leg fatigue, dyspnea and target heart rate achieved.  The patient reported dyspnea during the stress test.  Isotope Administration Nuclear imaging was accomplished using a two day high dose protocol with 37 mCi of technetium  tetrofosmin injected at the peak of exercise on 8/16/2022 and 36.2 mCi of technetium tetrofosmin at rest on 8/18/2022.  Nuclear Study Quality The  images demonstrate diaphragmatic attenuation. Final image quality is satisfactory.  Ejection Fraction    Left Ventricular EF   73 %           Stress Measurements    Baseline Vitals  Baseline HR   92 bpm      Baseline Systolic BP   128      Baseline Diastolic BP   62      Peak Stress Vitals  Max HR   137      Last Stress Systolic BP   188      Last Stress Diastolic BP   64      Exercise Data  Target HR   146      Max Predicted HR   94 %      Exercise duration (min)   4 min      Exercise duration (sec)   4 sec      Estimated workload   6.5 METS          ECG Reviewed:  Date: Results:    Cath:  Date: Results:      METS/Exercise Tolerance: >4 METS    Hematologic:  - neg hematologic  ROS     Musculoskeletal:   (+)  arthritis,             GI/Hepatic:  - neg GI/hepatic ROS     Renal/Genitourinary:     (+)        BPH,      Endo:     (+)  type II DM, Last HgA1c: 6.3, date: 9/20/23, Not using insulin, - not using insulin pump.         Obesity,       Psychiatric/Substance Use:  - neg psychiatric ROS     Infectious Disease:  - neg infectious disease ROS     Malignancy:  - neg malignancy ROS     Other:  - neg other ROS          Physical Exam    Airway        Mallampati: III   TM distance: > 3 FB   Neck ROM: full   Mouth opening: > 3 cm    Respiratory Devices and Support         Dental       (+) Modest Abnormalities - crowns, retainers, 1 or 2 missing teeth      Cardiovascular   cardiovascular exam normal       Rhythm and rate: regular and normal     Pulmonary   pulmonary exam normal        breath sounds clear to auscultation           OUTSIDE LABS:  CBC:   Lab Results   Component Value Date    WBC 8.5 08/06/2021    WBC 12.5 (H) 08/01/2018    HGB 14.3 08/06/2021    HGB 15.3 08/01/2018    HCT 41.3 08/06/2021    HCT 44.3 08/01/2018     08/06/2021      "08/01/2018     BMP:   Lab Results   Component Value Date     09/20/2023     08/10/2022    POTASSIUM 4.4 09/20/2023    POTASSIUM 4.1 08/10/2022    CHLORIDE 101 09/20/2023    CHLORIDE 105 08/10/2022    CO2 28 09/20/2023    CO2 28 08/10/2022    BUN 24.1 (H) 09/20/2023    BUN 23 08/10/2022    CR 0.98 09/20/2023    CR 0.96 08/10/2022     (H) 10/05/2023     (H) 09/20/2023     COAGS: No results found for: \"PTT\", \"INR\", \"FIBR\"  POC:   Lab Results   Component Value Date     (H) 09/25/2020     HEPATIC:   Lab Results   Component Value Date    ALBUMIN 3.9 05/02/2017    PROTTOTAL 7.4 05/02/2017    ALT 28 05/02/2017    AST 12 05/02/2017    ALKPHOS 157 (H) 05/02/2017    BILITOTAL 0.3 05/02/2017     OTHER:   Lab Results   Component Value Date    A1C 6.3 (H) 09/20/2023    ALLEN 9.6 09/20/2023    TSH 1.15 05/08/2019       Anesthesia Plan    ASA Status:  3    NPO Status:  NPO Appropriate    Anesthesia Type: MAC.     - Reason for MAC: straight local not clinically adequate   Induction: N/a.   Maintenance: N/A.        Consents    Anesthesia Plan(s) and associated risks, benefits, and realistic alternatives discussed. Questions answered and patient/representative(s) expressed understanding.     - Discussed:     - Discussed with:  Patient      - Extended Intubation/Ventilatory Support Discussed: No.      - Patient is DNR/DNI Status: No     Use of blood products discussed: No .     Postoperative Care    Pain management: IV analgesics.        Comments:    Other Comments: The risks and benefits of anesthesia, and the alternatives where applicable, have been discussed with the patient, and they wish to proceed.            NELLY Kurtz CRNA  "

## 2023-10-19 ENCOUNTER — ANESTHESIA (OUTPATIENT)
Dept: SURGERY | Facility: CLINIC | Age: 78
End: 2023-10-19
Payer: MEDICARE

## 2023-10-19 ENCOUNTER — HOSPITAL ENCOUNTER (OUTPATIENT)
Facility: CLINIC | Age: 78
Discharge: HOME | End: 2023-10-19
Attending: INTERNAL MEDICINE | Admitting: INTERNAL MEDICINE
Payer: MEDICARE

## 2023-10-19 VITALS
DIASTOLIC BLOOD PRESSURE: 83 MMHG | OXYGEN SATURATION: 96 % | SYSTOLIC BLOOD PRESSURE: 164 MMHG | HEART RATE: 65 BPM | TEMPERATURE: 98.1 F

## 2023-10-19 PROCEDURE — 370N000004 HC ANESTHESIA CATARACT PACKAGE: Performed by: INTERNAL MEDICINE

## 2023-10-19 PROCEDURE — 761N000008 HC RECOVERY CATRACT PACKAGE: Performed by: INTERNAL MEDICINE

## 2023-10-19 PROCEDURE — V2632 POST CHMBR INTRAOCULAR LENS: HCPCS | Performed by: INTERNAL MEDICINE

## 2023-10-19 PROCEDURE — 250N000009 HC RX 250: Performed by: INTERNAL MEDICINE

## 2023-10-19 PROCEDURE — 250N000011 HC RX IP 250 OP 636: Mod: JZ | Performed by: INTERNAL MEDICINE

## 2023-10-19 PROCEDURE — 360N000007 HC CATARACT SURGICAL PACKAGE: Performed by: INTERNAL MEDICINE

## 2023-10-19 PROCEDURE — 250N000011 HC RX IP 250 OP 636: Performed by: NURSE ANESTHETIST, CERTIFIED REGISTERED

## 2023-10-19 DEVICE — EYE IMP IOL AMO PCL TECNIS ZCB00 22.0: Type: IMPLANTABLE DEVICE | Site: EYE | Status: FUNCTIONAL

## 2023-10-19 RX ORDER — ONDANSETRON 4 MG/1
4 TABLET, ORALLY DISINTEGRATING ORAL EVERY 30 MIN PRN
Status: CANCELLED | OUTPATIENT
Start: 2023-10-19

## 2023-10-19 RX ORDER — PROPARACAINE HYDROCHLORIDE 5 MG/ML
1 SOLUTION/ DROPS OPHTHALMIC ONCE
Status: DISCONTINUED | OUTPATIENT
Start: 2023-10-19 | End: 2023-10-19 | Stop reason: HOSPADM

## 2023-10-19 RX ORDER — PHENYLEPHRINE HYDROCHLORIDE 25 MG/ML
1 SOLUTION/ DROPS OPHTHALMIC
Status: COMPLETED | OUTPATIENT
Start: 2023-10-19 | End: 2023-10-19

## 2023-10-19 RX ORDER — CYCLOPENTOLATE HYDROCHLORIDE 10 MG/ML
1 SOLUTION/ DROPS OPHTHALMIC
Status: COMPLETED | OUTPATIENT
Start: 2023-10-19 | End: 2023-10-19

## 2023-10-19 RX ORDER — PREDNISOLONE/MOXIFLO/NEPAFENAC 1-0.5-0.1%
SUSPENSION, DROPS(FINAL DOSAGE FORM)(ML) OPHTHALMIC (EYE) PRN
Status: DISCONTINUED | OUTPATIENT
Start: 2023-10-19 | End: 2023-10-19 | Stop reason: HOSPADM

## 2023-10-19 RX ORDER — MOXIFLOXACIN 5 MG/ML
1 SOLUTION/ DROPS OPHTHALMIC
Status: COMPLETED | OUTPATIENT
Start: 2023-10-19 | End: 2023-10-19

## 2023-10-19 RX ORDER — LIDOCAINE 40 MG/G
CREAM TOPICAL
Status: DISCONTINUED | OUTPATIENT
Start: 2023-10-19 | End: 2023-10-19 | Stop reason: HOSPADM

## 2023-10-19 RX ORDER — FENTANYL CITRATE 50 UG/ML
25 INJECTION, SOLUTION INTRAMUSCULAR; INTRAVENOUS
Status: CANCELLED | OUTPATIENT
Start: 2023-10-19

## 2023-10-19 RX ORDER — ONDANSETRON 2 MG/ML
4 INJECTION INTRAMUSCULAR; INTRAVENOUS EVERY 30 MIN PRN
Status: CANCELLED | OUTPATIENT
Start: 2023-10-19

## 2023-10-19 RX ORDER — DICLOFENAC SODIUM 1 MG/ML
1 SOLUTION/ DROPS OPHTHALMIC
Status: COMPLETED | OUTPATIENT
Start: 2023-10-19 | End: 2023-10-19

## 2023-10-19 RX ORDER — FENTANYL CITRATE 50 UG/ML
INJECTION, SOLUTION INTRAMUSCULAR; INTRAVENOUS PRN
Status: DISCONTINUED | OUTPATIENT
Start: 2023-10-19 | End: 2023-10-19

## 2023-10-19 RX ORDER — PROPARACAINE HYDROCHLORIDE 5 MG/ML
1 SOLUTION/ DROPS OPHTHALMIC ONCE
Status: COMPLETED | OUTPATIENT
Start: 2023-10-19 | End: 2023-10-19

## 2023-10-19 RX ADMIN — PHENYLEPHRINE HYDROCHLORIDE 1 DROP: 25 SOLUTION/ DROPS OPHTHALMIC at 08:20

## 2023-10-19 RX ADMIN — FENTANYL CITRATE 50 MCG: 50 INJECTION INTRAMUSCULAR; INTRAVENOUS at 09:44

## 2023-10-19 RX ADMIN — DICLOFENAC SODIUM 1 DROP: 1 SOLUTION/ DROPS OPHTHALMIC at 08:29

## 2023-10-19 RX ADMIN — CYCLOPENTOLATE HYDROCHLORIDE 1 DROP: 10 SOLUTION/ DROPS OPHTHALMIC at 08:28

## 2023-10-19 RX ADMIN — MOXIFLOXACIN 1 DROP: 5 SOLUTION/ DROPS OPHTHALMIC at 08:29

## 2023-10-19 RX ADMIN — DICLOFENAC SODIUM 1 DROP: 1 SOLUTION/ DROPS OPHTHALMIC at 08:25

## 2023-10-19 RX ADMIN — PROPARACAINE HYDROCHLORIDE 1 DROP: 5 SOLUTION/ DROPS OPHTHALMIC at 08:17

## 2023-10-19 RX ADMIN — MOXIFLOXACIN 1 DROP: 5 SOLUTION/ DROPS OPHTHALMIC at 08:22

## 2023-10-19 RX ADMIN — CYCLOPENTOLATE HYDROCHLORIDE 1 DROP: 10 SOLUTION/ DROPS OPHTHALMIC at 08:35

## 2023-10-19 RX ADMIN — DICLOFENAC SODIUM 1 DROP: 1 SOLUTION/ DROPS OPHTHALMIC at 08:40

## 2023-10-19 RX ADMIN — MIDAZOLAM 1 MG: 1 INJECTION INTRAMUSCULAR; INTRAVENOUS at 09:29

## 2023-10-19 RX ADMIN — FENTANYL CITRATE 50 MCG: 50 INJECTION INTRAMUSCULAR; INTRAVENOUS at 09:29

## 2023-10-19 RX ADMIN — PHENYLEPHRINE HYDROCHLORIDE 1 DROP: 25 SOLUTION/ DROPS OPHTHALMIC at 08:36

## 2023-10-19 RX ADMIN — MOXIFLOXACIN 1 DROP: 5 SOLUTION/ DROPS OPHTHALMIC at 08:38

## 2023-10-19 RX ADMIN — PHENYLEPHRINE HYDROCHLORIDE 1 DROP: 25 SOLUTION/ DROPS OPHTHALMIC at 08:27

## 2023-10-19 RX ADMIN — CYCLOPENTOLATE HYDROCHLORIDE 1 DROP: 10 SOLUTION/ DROPS OPHTHALMIC at 08:19

## 2023-10-19 ASSESSMENT — ACTIVITIES OF DAILY LIVING (ADL): ADLS_ACUITY_SCORE: 35

## 2023-10-19 NOTE — ANESTHESIA CARE TRANSFER NOTE
Patient: Ben Ceron    Procedure: Procedure(s):  Phacoemulsification with standard intraocular lens implant       Diagnosis: Cataract [H26.9]  Diagnosis Additional Information: No value filed.    Anesthesia Type:   MAC     Note:    Oropharynx: spontaneously breathing  Level of Consciousness: awake  Oxygen Supplementation: room air    Independent Airway: airway patency satisfactory and stable  Dentition: dentition unchanged  Vital Signs Stable: post-procedure vital signs reviewed and stable  Report to RN Given: handoff report given  Patient transferred to: Phase II    Handoff Report: Identifed the Patient, Identified the Reponsible Provider, Reviewed the pertinent medical history, Discussed the surgical course, Reviewed Intra-OP anesthesia mangement and issues during anesthesia, Set expectations for post-procedure period and Allowed opportunity for questions and acknowledgement of understanding      Vitals:  Vitals Value Taken Time   BP     Temp     Pulse     Resp     SpO2         Electronically Signed By: NELLY Kurtz CRNA  October 19, 2023  10:00 AM

## 2023-10-19 NOTE — DISCHARGE INSTRUCTIONS
POST CATARACT SURGERY EYE INSTRUCTIONS             Eye Medications      *Instill one drop into the operative eye 3 times a day for 2 weeks then once a day x 2 weeks..      Wear eye shield x 24 hours and have the doctor remove the shield tomorrow.  Then only wear the shield at bedtime while sleeping for 3 days    Refrain from heavy lifting, bending, straining, or strenuous activity for 1 week.    Do not rub or push on the operative eye for 1 week (you may wipe the eye lid(s) gently with a wet wash cloth to remove matter from eyelashes).    You may bathe or shower, but do not get the eye wet for 1 month (swimming, hot tubs or other water activities).    Minor itching, scratching sensation, burning sensation, etc. is normal.  Report any severe eye pain or loss of vision.      Bring all material and medications to the office on the first post op day.     Call your surgeon at [phone] or the answering service at [phone] for any problems, questions or concerns, especially pain.

## 2023-10-19 NOTE — OP NOTE
[facility]  Ophthalmology Operative Note    PREOPERATIVE DIAGNOSIS: Cataract, Right eye.     POSTOPERATIVE DIAGNOSIS: Cataract, Right eye.     OPERATION: Cataract extraction with placement of posterior chamber intraocular lens in the Right eye.     ANESTHESIA: MAC combined with topical     INDICATIONS FOR PROCEDURE: Ben Ceron was seen in the [facility] for decreased visual acuity in the Right eye. The patient was found to have a visually significant cataract in the Right eye. The risks, benefits, alternatives and goals of cataract extraction were discussed with the patient, and after adequate discussion the patient understood and agreed to these, and a signed informed consent was obtained prior to the procedure.     DESCRIPTION OF PROCEDURE: After proper patient identification, topical anesthesia was applied to the Right eye. The patient was brought to the operating room and the Right eye was prepped and draped in the usual sterile fashion for intraocular surgery. A lid speculum was placed in the Right eye. A paracentesis was then created and the anterior chamber was filled with 1% non-preserved lidocaine followed by Endocoat. A clear corneal incision was then created temporally using a 2.4mm keratome. A continuous curvilinear capsulorrhexis was then created using a cystotome and Utrata forceps. Hydrodissection was carried out with BSS on a cannula and the lens rotated freely within the capsular bag. Phacoemulsification was then carried out using the divide and conquer technique. Residual cortical material was removed using the I&A handpiece. The capsular bag was then filled with Healon and a ZCB00 +22.0D diopter intraocular lens was then injected into the capsular bag. The lens showed good centration and stability. Residual viscoelastic was removed using the I&A handpiece. The wound was then hydrated and the anterior chamber reformed. Intracameral Moxifloxacin was  then injected into the anterior chamber. The wounds were then checked and found to be sealed. Topical Prednisolone drops were placed in the patient's Right eye followed by a Bey shield over the top of this. The patient tolerated the procedure well without complications and was told to follow up in the clinic in the next postoperative day.     Implant Name Type Inv. Item Serial No.  Lot No. LRB No. Used Action   EYE IMP IOL ANUSHA PCL TECNIS ZCB00 22.0 - [id number] Lens/Eye Implant EYE IMP IOL ANUSHA PCL TECNIS ZCB00 22.0 [id number] [employer]  Right 1 Implanted        Kyle Johnson MD

## 2023-10-19 NOTE — ANESTHESIA POSTPROCEDURE EVALUATION
Patient: Ben Ceron    Procedure: Procedure(s):  Phacoemulsification with standard intraocular lens implant       Anesthesia Type:  MAC    Note:  Disposition: Outpatient   Postop Pain Control: Uneventful            Sign Out: Well controlled pain   PONV: No   Neuro/Psych: Uneventful            Sign Out: Acceptable/Baseline neuro status   Airway/Respiratory: Uneventful            Sign Out: Acceptable/Baseline resp. status   CV/Hemodynamics: Uneventful            Sign Out: Acceptable CV status   Other NRE: NONE   DID A NON-ROUTINE EVENT OCCUR? No    Event details/Postop Comments:  Pt was happy with anesthesia care.  No complications.  I will follow up with the pt if needed.           Last vitals:  Vitals:    10/19/23 0820 10/19/23 0827   BP: (!) 170/77 (!) 172/92   Pulse: 67 62   Temp: 98.3  F (36.8  C) 98.3  F (36.8  C)   SpO2: 91% 97%       Electronically Signed By: NELLY Kurtz CRNA  October 19, 2023  10:00 AM

## 2023-10-23 DIAGNOSIS — Z12.11 SCREEN FOR COLON CANCER: Primary | ICD-10-CM

## 2023-11-07 NOTE — H&P
[facility] Anesthesia Pre-op History and Physical    Ben KATY Ceron MRN# [id number]   Age: 77 year old YOB: 1945      Date of Surgery: 11/8/2023 Location [facility]      Date of Exam 11/8/2023 Facility (In hospital)       Home clinic: [facility]  Primary care provider: Mina Walters         Chief Complaint and/or Reason for Procedure:   No chief complaint on file.  Colonoscopy  Exam 2020 advanced adenomas. Brother with CRC.       Active problem list:     Patient Active Problem List    Diagnosis Date Noted    Type 2 diabetes mellitus with other specified complication, without long-term current use of insulin (H) 08/10/2022     Priority: Medium    Strain of right biceps muscle, subsequent encounter 09/21/2018     Priority: Medium    S/P arthroscopy of right knee 08/13/2018     Priority: Medium    Primary osteoarthritis of right knee 08/01/2018     Priority: Medium    Complex tear of medial meniscus of right knee as current injury, initial encounter 08/01/2018     Priority: Medium    Benign prostatic hyperplasia with urinary frequency 04/27/2018     Priority: Medium    Hyperlipidemia LDL goal <100 05/03/2017     Priority: Medium    Primary osteoarthritis of left knee 12/15/2016     Priority: Medium    Benign non-nodular prostatic hyperplasia 05/31/2016     Priority: Medium    Morbid obesity with BMI of 45.0-49.9, adult (H) 04/04/2016     Priority: Medium    Family history of colon cancer 04/04/2016     Priority: Medium     brother with colonoscopy      Rash 04/04/2016     Priority: Medium    LUCAS (obstructive sleep apnea) 04/04/2016     Priority: Medium    Essential hypertension with goal blood pressure less than 140/90 08/27/2013     Priority: Medium    Advance Care Planning 11/18/2011     Priority: Medium     Advance Care Planning: Receipt of ACP document:  Received: Health Care Directive which was witnessed or notarized on  12-22-10.  Document previously scanned on 11-15-11.  Validation form completed and sent to be scanned.  Code Status should state FULL code.  Confirmed/documented designated decision maker(s). See permanent comments section of demographics in clinical tab. View document(s) and details by clicking on code status. Added by Meena Prescott RN, System ACP Coordinator [employer] on 12/3/2014.  11/18/11Received outside Health Care Directive. Previously signed by patient and notary on 12/22/2010.  scanned and placed behind media tab on 11/15/11.  Please see Health Care Directive for specifics. Code status updated...Marylu Cruz RN                Medications (include herbals and vitamins):   Any Plavix use in the last 7 days? No     No current facility-administered medications for this encounter.     Current Outpatient Medications   Medication Sig    aspirin 81 MG tablet Take by mouth daily    Cholecalciferol (VITAMIN D) 2000 UNITS tablet Take 1 tablet by mouth daily.    ezetimibe (ZETIA) 10 MG tablet Take 1 tablet (10 mg) by mouth daily    finasteride (PROSCAR) 5 MG tablet Take 1 tablet (5 mg) by mouth daily    losartan-hydrochlorothiazide (HYZAAR) 100-25 MG tablet Take 1 tablet by mouth daily    metFORMIN (GLUCOPHAGE) 500 MG tablet Take 1 tablet (500 mg) by mouth 2 times daily (with meals)    metoprolol succinate ER (TOPROL XL) 100 MG 24 hr tablet Take 1 tablet (100 mg) by mouth daily    NIFEdipine ER (ADALAT CC) 60 MG 24 hr tablet Take 1 tablet (60 mg) by mouth daily    VITAMIN E 400 UNIT OR CAPS     Acetaminophen (TYLENOL PO) As needed    ADVIL 200 MG OR TABS     alcohol swab prep pads Use to swab area of injection/federica as directed. (Patient not taking: Reported on 9/20/2023)    bisacodyl (DULCOLAX) 5 MG EC tablet 2 days prior to procedure, take 2 tablets at 4 pm. 1 day prior to procedure, take 2 tablets at 4 pm. For additional instructions refer to your colonoscopy prep instructions.    clobetasol  (TEMOVATE) 0.05 % cream Small amount to affected areas BID    desoximetasone (TOPICORT) 0.25 % cream use as directed to legs    order for DME Equipment being ordered: CPAP and related hardware, mask and tubing as well heated humidity equipment.    polyethylene glycol (GOLYTELY) 236 g suspension 2 days prior at 5pm, mix and drink half of a jug of Golytely. Drink an 8 oz. glass of Golytely every 15 minutes until half of the jug is gone. Place remainder of Golytely in the refrigerator. 1 day prior at 5 pm, drink the 2nd half of a jug of Golytely bowel prep. 6 hours before your check-in time, drink an 8 oz. glass of Golytely every 15 minutes until half of the 2nd jug of Golytely is gone. Discard remainder of second jug.    thin (NO BRAND SPECIFIED) lancets Use with lanceting device. To accompany: Blood Glucose Monitor Brands: per insurance. (Patient not taking: Reported on 9/20/2023)             Allergies:      Allergies   Allergen Reactions    Norvasc [Amlodipine] Muscle Pain (Myalgia)    Simvastatin Rash     Rash and edema     Allergy to Latex? No  Allergy to tape?   No  Intolerances:             Physical Exam:   All vitals have been reviewed  No data found.  No intake/output data recorded.  Lungs:   No increased work of breathing, good air exchange, clear to auscultation bilaterally, no crackles or wheezing     Cardiovascular:   Normal apical impulse, regular rate and rhythm, normal S1 and S2, no S3 or S4, and no murmur noted             Lab / Radiology Results:            Anesthetic risk and/or ASA classification:       Brad Juarez MD    1

## 2023-11-08 ENCOUNTER — ANESTHESIA EVENT (OUTPATIENT)
Dept: GASTROENTEROLOGY | Facility: CLINIC | Age: 78
End: 2023-11-08
Payer: MEDICARE

## 2023-11-08 ENCOUNTER — ANESTHESIA (OUTPATIENT)
Dept: GASTROENTEROLOGY | Facility: CLINIC | Age: 78
End: 2023-11-08
Payer: MEDICARE

## 2023-11-08 ENCOUNTER — HOSPITAL ENCOUNTER (OUTPATIENT)
Facility: CLINIC | Age: 78
Discharge: HOME | End: 2023-11-08
Attending: INTERNAL MEDICINE | Admitting: INTERNAL MEDICINE
Payer: MEDICARE

## 2023-11-08 VITALS
SYSTOLIC BLOOD PRESSURE: 160 MMHG | RESPIRATION RATE: 16 BRPM | DIASTOLIC BLOOD PRESSURE: 83 MMHG | OXYGEN SATURATION: 95 % | TEMPERATURE: 97.8 F | HEART RATE: 63 BPM

## 2023-11-08 LAB
COLONOSCOPY: NORMAL
GLUCOSE BLDC GLUCOMTR-MCNC: 119 MG/DL (ref 70–99)

## 2023-11-08 PROCEDURE — 88305 TISSUE EXAM BY PATHOLOGIST: CPT | Mod: 26 | Performed by: PATHOLOGY

## 2023-11-08 PROCEDURE — 370N000017 HC ANESTHESIA TECHNICAL FEE, PER MIN: Performed by: INTERNAL MEDICINE

## 2023-11-08 PROCEDURE — 88305 TISSUE EXAM BY PATHOLOGIST: CPT | Mod: TC | Performed by: INTERNAL MEDICINE

## 2023-11-08 PROCEDURE — 82962 GLUCOSE BLOOD TEST: CPT

## 2023-11-08 PROCEDURE — 45385 COLONOSCOPY W/LESION REMOVAL: CPT | Mod: PT | Performed by: INTERNAL MEDICINE

## 2023-11-08 PROCEDURE — 258N000003 HC RX IP 258 OP 636: Performed by: NURSE ANESTHETIST, CERTIFIED REGISTERED

## 2023-11-08 PROCEDURE — 250N000011 HC RX IP 250 OP 636: Performed by: NURSE ANESTHETIST, CERTIFIED REGISTERED

## 2023-11-08 PROCEDURE — 250N000009 HC RX 250: Performed by: NURSE ANESTHETIST, CERTIFIED REGISTERED

## 2023-11-08 RX ORDER — PROPOFOL 10 MG/ML
INJECTION, EMULSION INTRAVENOUS CONTINUOUS PRN
Status: DISCONTINUED | OUTPATIENT
Start: 2023-11-08 | End: 2023-11-08

## 2023-11-08 RX ORDER — PROPOFOL 10 MG/ML
INJECTION, EMULSION INTRAVENOUS PRN
Status: DISCONTINUED | OUTPATIENT
Start: 2023-11-08 | End: 2023-11-08

## 2023-11-08 RX ORDER — ONDANSETRON 4 MG/1
4 TABLET, ORALLY DISINTEGRATING ORAL EVERY 30 MIN PRN
Status: DISCONTINUED | OUTPATIENT
Start: 2023-11-08 | End: 2023-11-08 | Stop reason: HOSPADM

## 2023-11-08 RX ORDER — LIDOCAINE HYDROCHLORIDE 10 MG/ML
INJECTION, SOLUTION INFILTRATION; PERINEURAL PRN
Status: DISCONTINUED | OUTPATIENT
Start: 2023-11-08 | End: 2023-11-08

## 2023-11-08 RX ORDER — ONDANSETRON 2 MG/ML
4 INJECTION INTRAMUSCULAR; INTRAVENOUS EVERY 30 MIN PRN
Status: DISCONTINUED | OUTPATIENT
Start: 2023-11-08 | End: 2023-11-08 | Stop reason: HOSPADM

## 2023-11-08 RX ORDER — SODIUM CHLORIDE, SODIUM LACTATE, POTASSIUM CHLORIDE, CALCIUM CHLORIDE 600; 310; 30; 20 MG/100ML; MG/100ML; MG/100ML; MG/100ML
INJECTION, SOLUTION INTRAVENOUS CONTINUOUS
Status: DISCONTINUED | OUTPATIENT
Start: 2023-11-08 | End: 2023-11-08 | Stop reason: HOSPADM

## 2023-11-08 RX ADMIN — PROPOFOL 50 MG: 10 INJECTION, EMULSION INTRAVENOUS at 10:28

## 2023-11-08 RX ADMIN — LIDOCAINE HYDROCHLORIDE 50 MG: 10 INJECTION, SOLUTION INFILTRATION; PERINEURAL at 10:28

## 2023-11-08 RX ADMIN — PROPOFOL 50 MG: 10 INJECTION, EMULSION INTRAVENOUS at 10:27

## 2023-11-08 RX ADMIN — SODIUM CHLORIDE, POTASSIUM CHLORIDE, SODIUM LACTATE AND CALCIUM CHLORIDE: 600; 310; 30; 20 INJECTION, SOLUTION INTRAVENOUS at 10:22

## 2023-11-08 RX ADMIN — PROPOFOL 200 MCG/KG/MIN: 10 INJECTION, EMULSION INTRAVENOUS at 10:28

## 2023-11-08 ASSESSMENT — ACTIVITIES OF DAILY LIVING (ADL): ADLS_ACUITY_SCORE: 35

## 2023-11-08 NOTE — DISCHARGE INSTRUCTIONS
[facility]    Home Care Following Endoscopy          Activity:  You have just undergone an endoscopic procedure under sedation.  Do not work or operate machinery (including a car) for at least 12 hours.      Diet:  Return to the diet you were on before your procedure but eat lightly for the first 12-24 hours.  Drink plenty of water.  Resume any regular medications unless otherwise advised by your physician.  Please begin any new medication prescribed as a result of your procedure as directed by your physician.   If you had any biopsy or polyp removed please refrain from aspirin or aspirin products for 2 days.  If on Coumadin please restart as instructed by your physician.   Pain:  You may take Tylenol as needed for pain.  Expected Recovery:  You can expect some mild abdominal fullness and/or discomfort due to the air used to inflate your intestinal tract. I encourage you to walk and attempt to pass air as soon as possible.        Call Your Physician if You Have:  .  After Colonoscopy:  Worsening persisting abdominal pain which is worse with activity.  Fevers (>101 degrees F), chills or shakes.  Passage of continued blood with bowel movements.   Any questions or concerns about your recovery, please call [phone] or after hours [phone] Nurse Advice Line.    Follow-up Care:  If you had polyps/biopsy tissue sample(s) removed, the polyps/biopsy tissue sample(s) will be sent to pathology.  You should receive a letter in your My Chart with your results, within 1-2 weeks. If you do not participate in My Chart a physical letter will come in the mail in 2-3 weeks.  Please call if you have not received a notification of your results.

## 2023-11-08 NOTE — ANESTHESIA CARE TRANSFER NOTE
Patient: Ben Ceron    Procedure: Procedure(s):  COLONOSCOPY, FLEXIBLE, WITH LESION REMOVAL USING SNARE       Diagnosis: Screen for colon cancer [Z12.11]  Diagnosis Additional Information: No value filed.    Anesthesia Type:   MAC     Note:    Oropharynx: oropharynx clear of all foreign objects and spontaneously breathing  Level of Consciousness: drowsy  Oxygen Supplementation: face mask    Independent Airway: airway patency satisfactory and stable  Dentition: dentition unchanged  Vital Signs Stable: post-procedure vital signs reviewed and stable  Report to RN Given: handoff report given  Patient transferred to: Phase II    Handoff Report: Identifed the Patient, Identified the Reponsible Provider, Reviewed the pertinent medical history, Discussed the surgical course, Reviewed Intra-OP anesthesia mangement and issues during anesthesia, Set expectations for post-procedure period and Allowed opportunity for questions and acknowledgement of understanding      Vitals:  Vitals Value Taken Time   /100 11/08/23 1120   Temp     Pulse 71 11/08/23 1120   Resp     SpO2 95 % 11/08/23 1112   Vitals shown include unfiled device data.    Electronically Signed By: NELLY Hurd CRNA  November 8, 2023  11:27 AM

## 2023-11-08 NOTE — ANESTHESIA POSTPROCEDURE EVALUATION
Patient: Ben Ceron    Procedure: Procedure(s):  COLONOSCOPY, FLEXIBLE, WITH LESION REMOVAL USING SNARE       Anesthesia Type:  MAC    Note:  Disposition: Outpatient   Postop Pain Control: Uneventful            Sign Out: Well controlled pain   PONV: No   Neuro/Psych: Uneventful            Sign Out: Acceptable/Baseline neuro status   Airway/Respiratory: Uneventful            Sign Out: Acceptable/Baseline resp. status   CV/Hemodynamics: Uneventful            Sign Out: Acceptable CV status   Other NRE: NONE   DID A NON-ROUTINE EVENT OCCUR? No    Event details/Postop Comments:  Pt was happy with anesthesia care.  No complications.  I will follow up with the pt if needed.           Last vitals:  Vitals Value Taken Time   /100 11/08/23 1120   Temp     Pulse 71 11/08/23 1120   Resp     SpO2 95 % 11/08/23 1112   Vitals shown include unfiled device data.    Electronically Signed By: NELLY Hurd CRNA  November 8, 2023  11:28 AM

## 2023-11-08 NOTE — ANESTHESIA PREPROCEDURE EVALUATION
Anesthesia Pre-Procedure Evaluation    Patient: Ben Ceron   MRN: [id number] : 1945        Procedure : Procedure(s):  Colonoscopy          Past Medical History:   Diagnosis Date    Benign non-nodular prostatic hyperplasia, presence of lower urinary tract symptoms unspecified 2016    Colonic polyps     Family history of colon cancer 2016    brother with colonoscopy     Hyperlipidemia LDL goal <100 5/3/2017    Hyperlipidemia LDL goal <130 2016    LUCAS (obstructive sleep apnea) 2016    Rash 2016      Past Surgical History:   Procedure Laterality Date    ARTHROSCOPY KNEE WITH MEDIAL MENISCECTOMY  2014    Procedure: ARTHROSCOPY KNEE WITH MEDIAL MENISCECTOMY;  Surgeon: Ramana Dominguez DO;  Location: [facility]    ARTHROSCOPY KNEE WITH MEDIAL MENISCECTOMY Right 8/3/2018    Procedure: ARTHROSCOPY KNEE WITH MEDIAL MENISCECTOMY;  Right knee arthroscopy with partial meniscectomy;  Surgeon: Ramana Dominguez DO;  Location: [facility] OR    COLONOSCOPY  2011    Polypectomy.    COLONOSCOPY N/A 2014    Procedure: COLONOSCOPY;  Surgeon: Brooks Burris MD;  Location: [facility]    COLONOSCOPY N/A 2020    Procedure: Colonoscopy with  polypectomy by biopsy;  Surgeon: Brooks Burris MD;  Location: [facility]    HC VASECTOMY UNILAT/BILAT W POSTOP SEMEN  1977    Vasectomy    PHACOEMULSIFICATION WITH STANDARD INTRAOCULAR LENS IMPLANT Left 10/5/2023    Procedure: Phacoemulsification with standard intraocular lens implant left;  Surgeon: Ricardo Ardon MD;  Location: [facility]    PHACOEMULSIFICATION WITH STANDARD INTRAOCULAR LENS IMPLANT Right 10/19/2023    Procedure: Right Phacoemulsification with standard intraocular lens implant;  Surgeon: Shimon Johnson MD;  Location: [facility] OR    ZZHC COLONOSCOPY W BIOPSY  08    ZZHC COLONOSCOPY W/WO BRUSH/WASH  2005    Polypectomy.      Allergies   Allergen Reactions    Norvasc [Amlodipine] Muscle Pain (Myalgia)     Simvastatin Rash     Rash and edema      Social History     Tobacco Use    Smoking status: Never    Smokeless tobacco: Never   Substance Use Topics    Alcohol use: Yes     Alcohol/week: 0.0 standard drinks of alcohol     Comment: occasional      Wt Readings from Last 1 Encounters:   09/20/23 134.8 kg (297 lb 1.6 oz)        Anesthesia Evaluation   Pt has had prior anesthetic. Type: MAC and General.    No history of anesthetic complications       ROS/MED HX  ENT/Pulmonary:     (+) sleep apnea, uses CPAP,                                     Neurologic:  - neg neurologic ROS     Cardiovascular:     (+) Dyslipidemia hypertension- -   -  - -                                 Previous cardiac testing   Echo: Date: Results:    Stress Test:  Date: 8/16/2022 Results:  Reading Physician Reading Date Result Priority  Finn Ochoa MD  [phone] 8/18/2022 Routine  Finn Ochoa MD  [phone] 8/18/2022     Result Text        The nuclear stress test is negative for inducible myocardial ischemia or infarction. The left ventricular ejection fraction at stress is 73%. Left ventricular function is normal.     The patient's exercise capacity is mildly impaired.     There is no prior study for comparison.       ECG Summary    ECG Baseline electrocardiogram demonstrates sinus rhythm.   The stress electrocardiogram is negative for inducible ischemic EKG changes.  Arrhythmias during stress: Occasional PVCs.  There were no arrhythmias during recovery.  Technical Comments    Cardiac Protocol An exercise stress test was performed following a Rich protocol with the patient exercising for 4 minutes and 4 seconds under the supervision of Dr. Finn ochoa.  Heart rate demonstrated a normal response to exercise.  Blood pressure demonstrated a borderline hypertensive response to exercise.  The patient's exercise capacity is mildly impaired.  The test was stopped due to fatigue, leg fatigue, dyspnea and target heart rate achieved.  The  patient reported dyspnea during the stress test.  Isotope Administration Nuclear imaging was accomplished using a two day high dose protocol with 37 mCi of technetium tetrofosmin injected at the peak of exercise on 8/16/2022 and 36.2 mCi of technetium tetrofosmin at rest on 8/18/2022.  Nuclear Study Quality The  images demonstrate diaphragmatic attenuation. Final image quality is satisfactory.  Ejection Fraction    Left Ventricular EF   73 %           Stress Measurements    Baseline Vitals  Baseline HR   92 bpm      Baseline Systolic BP   128      Baseline Diastolic BP   62      Peak Stress Vitals  Max HR   137      Last Stress Systolic BP   188      Last Stress Diastolic BP   64      Exercise Data  Target HR   146      Max Predicted HR   94 %      Exercise duration (min)   4 min      Exercise duration (sec)   4 sec      Estimated workload   6.5 METS          ECG Reviewed:  Date: Results:    Cath:  Date: Results:      METS/Exercise Tolerance: >4 METS    Hematologic:  - neg hematologic  ROS     Musculoskeletal:   (+)  arthritis,             GI/Hepatic:  - neg GI/hepatic ROS     Renal/Genitourinary:     (+)        BPH,      Endo:     (+)  type II DM, Last HgA1c: 6.3, date: 9/20/23, Not using insulin, - not using insulin pump.         Obesity,       Psychiatric/Substance Use:  - neg psychiatric ROS     Infectious Disease:  - neg infectious disease ROS     Malignancy:  - neg malignancy ROS     Other:  - neg other ROS          Physical Exam    Airway        Mallampati: III   TM distance: > 3 FB   Neck ROM: full   Mouth opening: > 3 cm    Respiratory Devices and Support         Dental       (+) Modest Abnormalities - crowns, retainers, 1 or 2 missing teeth      Cardiovascular   cardiovascular exam normal       Rhythm and rate: regular and normal     Pulmonary   pulmonary exam normal        breath sounds clear to auscultation           OUTSIDE LABS:  CBC:   Lab Results   Component Value Date    WBC 8.5  "08/06/2021    WBC 12.5 (H) 08/01/2018    HGB 14.3 08/06/2021    HGB 15.3 08/01/2018    HCT 41.3 08/06/2021    HCT 44.3 08/01/2018     08/06/2021     08/01/2018     BMP:   Lab Results   Component Value Date     09/20/2023     08/10/2022    POTASSIUM 4.4 09/20/2023    POTASSIUM 4.1 08/10/2022    CHLORIDE 101 09/20/2023    CHLORIDE 105 08/10/2022    CO2 28 09/20/2023    CO2 28 08/10/2022    BUN 24.1 (H) 09/20/2023    BUN 23 08/10/2022    CR 0.98 09/20/2023    CR 0.96 08/10/2022     (H) 10/05/2023     (H) 09/20/2023     COAGS: No results found for: \"PTT\", \"INR\", \"FIBR\"  POC:   Lab Results   Component Value Date     (H) 09/25/2020     HEPATIC:   Lab Results   Component Value Date    ALBUMIN 3.9 05/02/2017    PROTTOTAL 7.4 05/02/2017    ALT 28 05/02/2017    AST 12 05/02/2017    ALKPHOS 157 (H) 05/02/2017    BILITOTAL 0.3 05/02/2017     OTHER:   Lab Results   Component Value Date    A1C 6.3 (H) 09/20/2023    ALLEN 9.6 09/20/2023    TSH 1.15 05/08/2019       Anesthesia Plan    ASA Status:  3    NPO Status:  NPO Appropriate    Anesthesia Type: MAC.     - Reason for MAC: straight local not clinically adequate   Induction: Propofol.   Maintenance: TIVA.        Consents    Anesthesia Plan(s) and associated risks, benefits, and realistic alternatives discussed. Questions answered and patient/representative(s) expressed understanding.     - Discussed:     - Discussed with:  Patient      - Extended Intubation/Ventilatory Support Discussed: No.      - Patient is DNR/DNI Status: No     Use of blood products discussed: No .     Postoperative Care    Pain management: Multi-modal analgesia.   PONV prophylaxis: Background Propofol Infusion     Comments:    Other Comments: The risks and benefits of anesthesia, and the alternatives where applicable, have been discussed with the patient, and they wish to proceed.            Lea Mcdaniels, NELLY CRNA  "

## 2023-11-08 NOTE — LETTER
November 13, 2023      Ben Ceron  [address] [place] [facility] [address]        Dear ,    We are writing to inform you of your test results.    Mixture of benign polyps removed.  The large polyp in the cecum will need a colonoscopy with EMR technique.  As discussed this should be done in the [facility] when you return from [facility] in April and I have sent a request to help coordinate this procedure.    Resulted Orders   Surgical Pathology Exam   Result Value Ref Range    Case Report       Surgical Pathology Report                         Case: [id number]                                  Authorizing Provider:  Brad Juarez MD        Collected:           11/08/2023 10:43 AM          Ordering Location:     [facility]          Received:            11/08/2023 11:23 AM                                 [facility]                                                          Pathologist:           Charis Witt MD PhD                                                      Specimens:   A) - Large Intestine, Colon, Ascending                                                              B) - Large Intestine, Colon, Descending                                                             C) - Large Intestine, Colon, Sigmoid                                                       Final Diagnosis       A(1).  Colon, Ascending, polyps, polypectomy:  -Fragments of tubular adenomas  -Negative for high-grade dysplasia and malignancy.      B(2).  Colon, Descending, polyp, polypectomy:  -Tubular adenoma  -Negative for high-grade dysplasia and malignancy.      C(3).   Colon, Sigmoid, polyp, polypectomy:  -Hyperplastic polyp  -Negative for dysplasia or malignancy.          Clinical Information       Procedure:  COLONOSCOPY, FLEXIBLE, WITH LESION REMOVAL USING SNARE  Pre-op Diagnosis: Screen for colon cancer [Z12.11]  Post-op Diagnosis: Z12.11 - Screen for colon cancer [ICD-10-CM]      Gross Description       A(1).  "Large Intestine, Colon, Ascending, :  The specimen is received in formalin, labeled with the patient's name, medical record number and other identifying information designated \"ascending colon polyp\". It consists of multiple tan soft tissue fragments, 0.1-0.7 cm, admixed with colonic debris.  The largest fragment is inked black and sectioned.  Entirely submitted in 2 cassettes.    B(2). Large Intestine, Colon, Descending, :  The specimen is received in formalin, labeled with the patient's name, medical record number and other identifying information designated \"descending colon polyp\". It consists of a single 0.3 cm tan soft tissue fragment.  Entirely submitted in 1 cassette.    C(3). Large Intestine, Colon, Sigmoid, :  The specimen is received in formalin, labeled with the patient's name, medical record number and other identifying information designated \"sigmoid colon polyp\". It consists of 5 tan soft tissue fragments, 0.3-1.6 cm, admixed with colonic debris.  The largest fragment is inked black and sectioned.  Entirely submitted in 2 cassettes.  (Jacy Ardon [facility] Tech)      Microscopic Description       Microscopic examination was performed.      Performing Labs       The technical component of this testing was completed at [facility]      Case Images         If you have any questions or concerns, please call the clinic at the number listed above.       Sincerely,      Brad Juarez MD            "

## 2023-11-10 LAB
PATH REPORT.COMMENTS IMP SPEC: NORMAL
PATH REPORT.COMMENTS IMP SPEC: NORMAL
PATH REPORT.FINAL DX SPEC: NORMAL
PATH REPORT.GROSS SPEC: NORMAL
PATH REPORT.MICROSCOPIC SPEC OTHER STN: NORMAL
PATH REPORT.RELEVANT HX SPEC: NORMAL
PHOTO IMAGE: NORMAL

## 2023-11-13 ENCOUNTER — TELEPHONE (OUTPATIENT)
Dept: INTERNAL MEDICINE | Facility: CLINIC | Age: 78
End: 2023-11-13
Payer: COMMERCIAL

## 2023-11-13 DIAGNOSIS — Z86.0100 HISTORY OF COLONIC POLYPS: Primary | ICD-10-CM

## 2023-11-13 NOTE — TELEPHONE ENCOUNTER
Patient is requesting a callback from provider regarding colonoscopy. Would not go into detail just requested a callback only from pcp. Is aware out until Tuesday,.

## 2023-11-13 NOTE — LETTER
November 21, 2023      Ben Ceron  [address] [facility] [address]

## 2023-11-15 NOTE — TELEPHONE ENCOUNTER
Patient stopped by clinic.  He wants to know if he needs to gatroenterologist for consideration of EMR for cecal polyp    Recommendation:        - Await pathology results.                          - Refer to a gastroenterologist for consideration of                          EMR for cecal polyp.                          - The findings and recommendations were discussed with                          the patient.

## 2023-11-20 ENCOUNTER — PATIENT OUTREACH (OUTPATIENT)
Dept: GASTROENTEROLOGY | Facility: CLINIC | Age: 78
End: 2023-11-20
Payer: COMMERCIAL

## 2023-11-20 NOTE — TELEPHONE ENCOUNTER
The biopsy results are fortunately benign.  However, as the endoscopist was a gastroenterologist, I believe further evaluation is appropriate.  I have placed a referral for Gastroenterology consultation.  Please let the patient know that he should be expecting the  service to contact him.  Thank you.  Selena

## 2023-11-20 NOTE — TELEPHONE ENCOUNTER
Called pt to discuss Dr Juarez's request for Dr Sorenson to follow up for colon polyp removal. Left VM     Procedure/Imaging/Clinic: Colonoscopy with EMR   Physician: Delta   Timing: April 2024   Scope time needed: 45 min   Anesthesia: MAC   Dx: cecal polyp   Tier: 3   Location: [facility]   Header of letter for pt communication: Colonoscopy to remove polyp     Will send reminder to call pt back to schedule as timing (April 2024) approaches.    Jolene Yung, RN, BSN,   [facility]  Care coordinator

## 2023-11-21 NOTE — TELEPHONE ENCOUNTER
Pt returned call, left . Called pt back, he will be in [place] until April and  would like the polyp removed while he is in [place]. States that he asked the referring providers office to send him a mailing of the referral to take with him to [place]. He does not wish to schedule with us at this time, but will call back if changes his mind or can not get this done out of state.

## 2023-11-21 NOTE — TELEPHONE ENCOUNTER
Patient would like a more detailed letter/referral mailed to his home address regarding the polyp that is left, to ensure he gets an appointment with Gastroenterology. He is leaving for [place] tomorrow- mail is being forwarded and he will be looking for a  specialty provider there. He does not use My Chart   Lupis Nguyen MA on 11/20/2023 at 6:15 PM

## 2023-11-21 NOTE — TELEPHONE ENCOUNTER
Please send patient a copy of the endoscopy report and the pathology report.    I have nothing beyond this to add.    I cannot make a referral to a yet unnamed/unknown specialist in another state.    Thank you very much.    Selena

## 2023-12-13 ENCOUNTER — TELEPHONE (OUTPATIENT)
Dept: INTERNAL MEDICINE | Facility: CLINIC | Age: 78
End: 2023-12-13
Payer: COMMERCIAL

## 2023-12-13 DIAGNOSIS — K63.5 POLYP OF COLON, UNSPECIFIED PART OF COLON, UNSPECIFIED TYPE: Primary | ICD-10-CM

## 2023-12-13 NOTE — TELEPHONE ENCOUNTER
General Call    Contacts         Type Contact Phone/Fax    12/13/2023 09:32 AM CST Phone (Incoming) Ben Ceron (Self) [phone] (M)          Reason for Call:  Order for Colonoscopy/surgery, [facility]    What are your questions or concerns:  Patient stating he had a colonoscopy back in November and had polyps removed, except for 1 large one that the GI/surgeon did not feel comfortable with removing at the time.    Patient is out in [place] and has been checking around to try and get procedure done out there to have the poly removed, with no success.    Patient is wanting to be seen at [facility] for removal of the remaining polyp. Patient said he would come back and would like to have this scheduled in February or March?    Date of last appointment with provider: 9/20/23    Could we send this information to you in [place] or would you prefer to receive a phone call?:   Patient would prefer a phone call   Okay to leave a detailed message?: Yes at Cell number on file:    Telephone Information:   Mobile [phone]

## 2023-12-18 ENCOUNTER — TELEPHONE (OUTPATIENT)
Dept: INTERNAL MEDICINE | Facility: CLINIC | Age: 78
End: 2023-12-18
Payer: COMMERCIAL

## 2023-12-18 DIAGNOSIS — Z86.0100 HISTORY OF COLONIC POLYPS: Primary | ICD-10-CM

## 2023-12-18 NOTE — TELEPHONE ENCOUNTER
Order/Referral Request    Who is requesting:  [facility]    Orders being requested:  COLONOSCOPY WITH A EMR    Reason service is needed/diagnosis: POLYPS    When are orders needed by: ASAP    Has this been discussed with Provider: Yes    Does patient have a preference on a Group/Provider/Facility? [facility]    Does patient have an appointment scheduled?: No    Where to send orders: Fax [phone]    Could we send this information to you in [facility] or would you prefer to receive a phone call?:   No preferen

## 2024-01-06 ENCOUNTER — HEALTH MAINTENANCE LETTER (OUTPATIENT)
Age: 79
End: 2024-01-06

## 2024-04-26 ENCOUNTER — TELEPHONE (OUTPATIENT)
Dept: INTERNAL MEDICINE | Facility: CLINIC | Age: 79
End: 2024-04-26
Payer: COMMERCIAL

## 2024-04-26 DIAGNOSIS — E11.9 CONTROLLED TYPE 2 DIABETES MELLITUS WITHOUT COMPLICATION, WITHOUT LONG-TERM CURRENT USE OF INSULIN (H): ICD-10-CM

## 2024-04-26 DIAGNOSIS — I10 ESSENTIAL HYPERTENSION WITH GOAL BLOOD PRESSURE LESS THAN 140/90: ICD-10-CM

## 2024-04-26 DIAGNOSIS — E78.5 HYPERLIPIDEMIA LDL GOAL <130: Primary | ICD-10-CM

## 2024-04-26 RX ORDER — BEMPEDOIC ACID 180 MG/1
1 TABLET, FILM COATED ORAL DAILY
Qty: 90 TABLET | Refills: 0 | Status: SHIPPED | OUTPATIENT
Start: 2024-04-26

## 2024-04-26 RX ORDER — LOSARTAN POTASSIUM AND HYDROCHLOROTHIAZIDE 25; 100 MG/1; MG/1
1 TABLET ORAL DAILY
Qty: 90 TABLET | Refills: 0 | OUTPATIENT
Start: 2024-04-26

## 2024-04-26 RX ORDER — METOPROLOL SUCCINATE 100 MG/1
100 TABLET, EXTENDED RELEASE ORAL DAILY
Qty: 90 TABLET | Refills: 0 | OUTPATIENT
Start: 2024-04-26

## 2024-04-26 NOTE — TELEPHONE ENCOUNTER
Pt states he cannot take zetia unlesss it is brand name. Pharmacy states brand is not covered by insurance     Alternative nexletol- brand     Please resend alternative or start PA     Side note: pt has been getting his brand name zetia from [place] for years- unable to do this at this time      Edelmira Calix RN

## 2024-04-29 ENCOUNTER — TELEPHONE (OUTPATIENT)
Dept: INTERNAL MEDICINE | Facility: CLINIC | Age: 79
End: 2024-04-29
Payer: COMMERCIAL

## 2024-04-29 NOTE — TELEPHONE ENCOUNTER
Prior Authorization Retail Medication Request    Medication/Dose: Bempedoic Acid (NEXLETOL) 180 MG TABS 90  Diagnosis and ICD code (if different than what is on RX):  Hyperlipidemia LDL goal <130 [E78.5]  - Primary     New/renewal/insurance change PA/secondary ins. PA:  Previously Tried and Failed:  na  Rationale:  na    Insurance [employer] BLUE   Primary:   Insurance ID:  [id number]     Secondary (if applicable):    MEDICARE FOR HB SUPPLEMENT     Insurance ID:  [id number]     Pharmacy Information (if different than what is on RX)  Name:   [facility] - [place], [place] - [address]      Phone:  [phone]  Fax:       [phone]

## 2024-05-14 NOTE — TELEPHONE ENCOUNTER
Prior Authorization Approval    Authorization Effective Date: 1/1/2024  Authorization Expiration Date: 12/31/2024  Medication: Bempedoic Acid (NEXLETOL) 180 MG TABS-APPROVED  Reference #:     Insurance Company: [employer] - Phone [phone] Fax [phone]  Which Pharmacy is filling the prescription (Not needed for infusion/clinic administered): [facility] [address] [place] [address] - [address]  Pharmacy Notified: Yes  Patient Notified: Instructed pharmacy to notify patient when script is ready to /ship.

## 2024-05-14 NOTE — TELEPHONE ENCOUNTER
Retail Pharmacy Prior Authorization Team   Phone: [phone]    PA Initiation    Medication: NEXLETOL 180 MG PO TABS  Insurance Company: [employer] - Phone [phone] Fax [phone]  Pharmacy Filling the Rx: [facility] [address] [place] [address] - [address]  Filling Pharmacy Phone: [phone]  Filling Pharmacy Fax:    Start Date: 5/14/2024

## 2024-05-25 ENCOUNTER — HEALTH MAINTENANCE LETTER (OUTPATIENT)
Age: 79
End: 2024-05-25

## 2024-05-31 ENCOUNTER — TELEPHONE (OUTPATIENT)
Dept: INTERNAL MEDICINE | Facility: CLINIC | Age: 79
End: 2024-05-31
Payer: COMMERCIAL

## 2024-05-31 NOTE — TELEPHONE ENCOUNTER
Prior Authorization Retail Medication Request    Medication/Dose: ezetimibe (ZETIA) 10 MG tablet  Diagnosis and ICD code (if different than what is on RX):  Hyperlipidemia LDL goal <130 [E78.5]   New/renewal/insurance change PA/secondary ins. PA:  Previously Tried and Failed:    Rationale:      Insurance   Primary: [facility] Blue   Insurance ID:  [id number]     Secondary (if applicable):Medicare For Hb Suppleme   Insurance ID:      [id number]       Pharmacy Information (if different than what is on RX)  Name:  [facility]  Phone:  [phone]   Fax:    [phone]

## 2024-06-11 NOTE — TELEPHONE ENCOUNTER
Received a call back from pharmacy, she states there is a note that the patient is requesting Brand.  Will resubmit PA for Brand.

## 2024-06-11 NOTE — TELEPHONE ENCOUNTER
Note: Due to record-high volumes, our turn-around time is taking longer than usual . We are currently 2 weeks behind in the pools.   We are working diligently to submit all requests in a timely manner and in the order they are received. Please only flag TRUE URGENT requests as high priority to the pool at this time.   If you have questions on status of PA's,  please send a note/message in the active PA encounter and send back to the [facility] PA pool [[id number]].    If you have questions about the turn-around time or about our process, please reach out to our supervisor Alexandrea Walsh.   Thank you!   RPPA (Retail Pharmacy Prior Authorization) team    We are currently submitting requests we received on 06/03/2024    PRIOR AUTHORIZATION DENIED    Medication: ZETIA 10 MG PO TABS  Insurance Company: [employer] - Phone [phone] Fax [phone]  Denial Date: 6/11/2024  Denial Rational:         Appeal Information:   If provider would like to appeal please review the plan's reasons for denial listed above. Please utilize that information to complete letter and provide specific, detailed clinical information/rationale of your patient's health status to address their denial reasons.      Patient Notified: No

## 2024-06-11 NOTE — TELEPHONE ENCOUNTER
Retail Pharmacy Prior Authorization Team   Phone: [phone]    PA Initiation    Medication: EZETIMIBE 10 MG PO TABS  Insurance Company: [employer] - Phone [phone] Fax [phone]  Pharmacy Filling the Rx: [facility] [address] [place] [address] - [place] [address]  Filling Pharmacy Phone: [phone]  Filling Pharmacy Fax:    Start Date: 6/11/2024    Started PA on CMM and a response of Available without authorization.  Called and informed pharmacy.  **Instructed pharmacy to notify patient when script is ready to /ship.**

## 2024-06-11 NOTE — TELEPHONE ENCOUNTER
Patient states name brand Zetia is the only antihyperlipidemic medication that does not make him break out in a bad rash/cellulitis and edema.

## 2024-06-11 NOTE — TELEPHONE ENCOUNTER
Insurance is requesting specifics as to why generic did not work for the patient.  Please provide detailed information.  Answers are needed by EOD 06/12/2024.

## 2024-06-18 NOTE — TELEPHONE ENCOUNTER
Patient must have started his own appeal.    MEDICATION APPEAL APPROVED    Medication: ZETIA 10 MG PO TABS  Authorization Effective Date:  01/01/2024  Authorization Expiration Date: 12/31/2024   Approved Dose/Quantity:   Reference #:     Appeal Insurance Company:   Expected CoPay: $       CoPay Card Available:    Financial Assistance Needed:   Filling Pharmacy: [facility] [address] [place] [address] [address]  Patient Notified:     Comments:   **Instructed pharmacy to notify patient when script is ready to /ship.**      Received an approval from insurance but did not start the appeal

## 2024-07-25 ENCOUNTER — TELEPHONE (OUTPATIENT)
Dept: INTERNAL MEDICINE | Facility: CLINIC | Age: 79
End: 2024-07-25
Payer: COMMERCIAL

## 2024-07-25 NOTE — TELEPHONE ENCOUNTER
The patient is allergic to simvastatin.  Hence, I am hesitant to place him on pravastatin as per the recommendations of his insurance company.  Additionally, gemfibrozil is for the treatment of hypertriglyceridemia.  Patient's triglycerides are not problematic.  So this medication would be an appropriate.  At this time, I would recommend the patient discontinues the Zetia, been following we recheck his cholesterol in a couple months.  If it is still elevated, we can treat him with cholestyramine, which although inconvenient and considerably less effective; it is cost effective.    Selena

## 2024-07-25 NOTE — TELEPHONE ENCOUNTER
Patient is calling to request that you prescribe a trial of Gemfibrozal   OR   Pravastatin.    Patients appeal for Tier change for the Zetia was denied so he is willing to try an alternative to see if he can tolerate a different drug.    Can you please send one of these to the [facility] for patient.    Edda REDDINGO/

## 2024-07-25 NOTE — TELEPHONE ENCOUNTER
Not a RN triage call    Patient looking to speak with a TC (Torrie) for insurance denial.    [place] Ramirez, RN on 7/25/2024 at 12:51 PM

## 2024-07-29 NOTE — TELEPHONE ENCOUNTER
Patient is going to complete his current Rx, has appointment for Annual Well set up in October and will revisit medication at that time.  Edda Gomez XRO/

## 2024-08-29 DIAGNOSIS — N40.1 BENIGN PROSTATIC HYPERPLASIA WITH URINARY FREQUENCY: ICD-10-CM

## 2024-08-29 DIAGNOSIS — R35.0 BENIGN PROSTATIC HYPERPLASIA WITH URINARY FREQUENCY: ICD-10-CM

## 2024-08-29 RX ORDER — FINASTERIDE 5 MG/1
1 TABLET, FILM COATED ORAL DAILY
Qty: 90 TABLET | Refills: 0 | Status: SHIPPED | OUTPATIENT
Start: 2024-08-29

## 2024-10-21 ENCOUNTER — OFFICE VISIT (OUTPATIENT)
Dept: INTERNAL MEDICINE | Facility: CLINIC | Age: 79
End: 2024-10-21
Payer: COMMERCIAL

## 2024-10-21 VITALS
BODY MASS INDEX: 44.25 KG/M2 | OXYGEN SATURATION: 98 % | DIASTOLIC BLOOD PRESSURE: 84 MMHG | WEIGHT: 292 LBS | SYSTOLIC BLOOD PRESSURE: 148 MMHG | HEIGHT: 68 IN | TEMPERATURE: 97.2 F | HEART RATE: 57 BPM | RESPIRATION RATE: 16 BRPM

## 2024-10-21 DIAGNOSIS — N40.1 BENIGN PROSTATIC HYPERPLASIA WITH URINARY FREQUENCY: ICD-10-CM

## 2024-10-21 DIAGNOSIS — R35.0 BENIGN PROSTATIC HYPERPLASIA WITH URINARY FREQUENCY: ICD-10-CM

## 2024-10-21 DIAGNOSIS — E78.5 HYPERLIPIDEMIA LDL GOAL <130: ICD-10-CM

## 2024-10-21 DIAGNOSIS — Z12.5 SCREENING FOR PROSTATE CANCER: ICD-10-CM

## 2024-10-21 DIAGNOSIS — I10 ESSENTIAL HYPERTENSION WITH GOAL BLOOD PRESSURE LESS THAN 140/90: ICD-10-CM

## 2024-10-21 DIAGNOSIS — Z86.0100 HISTORY OF COLONIC POLYPS: ICD-10-CM

## 2024-10-21 DIAGNOSIS — E66.01 MORBID OBESITY WITH BMI OF 45.0-49.9, ADULT (H): ICD-10-CM

## 2024-10-21 DIAGNOSIS — G47.33 OSA (OBSTRUCTIVE SLEEP APNEA): ICD-10-CM

## 2024-10-21 DIAGNOSIS — E11.9 CONTROLLED TYPE 2 DIABETES MELLITUS WITHOUT COMPLICATION, WITHOUT LONG-TERM CURRENT USE OF INSULIN (H): Primary | ICD-10-CM

## 2024-10-21 DIAGNOSIS — Z00.00 MEDICARE ANNUAL WELLNESS VISIT, SUBSEQUENT: ICD-10-CM

## 2024-10-21 LAB
ALBUMIN SERPL BCG-MCNC: 4.2 G/DL (ref 3.5–5.2)
ALBUMIN UR-MCNC: NEGATIVE MG/DL
ALP SERPL-CCNC: 95 U/L (ref 40–150)
ALT SERPL W P-5'-P-CCNC: 14 U/L (ref 0–70)
ANION GAP SERPL CALCULATED.3IONS-SCNC: 11 MMOL/L (ref 7–15)
APPEARANCE UR: CLEAR
AST SERPL W P-5'-P-CCNC: 16 U/L (ref 0–45)
BILIRUB DIRECT SERPL-MCNC: <0.2 MG/DL (ref 0–0.3)
BILIRUB SERPL-MCNC: 0.9 MG/DL
BILIRUB UR QL STRIP: NEGATIVE
BUN SERPL-MCNC: 18.6 MG/DL (ref 8–23)
CALCIUM SERPL-MCNC: 9.8 MG/DL (ref 8.8–10.4)
CHLORIDE SERPL-SCNC: 102 MMOL/L (ref 98–107)
CHOLEST SERPL-MCNC: 230 MG/DL
COLOR UR AUTO: YELLOW
CREAT SERPL-MCNC: 0.99 MG/DL (ref 0.67–1.17)
EGFRCR SERPLBLD CKD-EPI 2021: 79 ML/MIN/1.73M2
EST. AVERAGE GLUCOSE BLD GHB EST-MCNC: 126 MG/DL
FASTING STATUS PATIENT QL REPORTED: YES
FASTING STATUS PATIENT QL REPORTED: YES
GLUCOSE SERPL-MCNC: 118 MG/DL (ref 70–99)
GLUCOSE UR STRIP-MCNC: NEGATIVE MG/DL
HBA1C MFR BLD: 6 %
HCO3 SERPL-SCNC: 27 MMOL/L (ref 22–29)
HDLC SERPL-MCNC: 51 MG/DL
HGB UR QL STRIP: NEGATIVE
KETONES UR STRIP-MCNC: NEGATIVE MG/DL
LDLC SERPL CALC-MCNC: 147 MG/DL
LEUKOCYTE ESTERASE UR QL STRIP: NEGATIVE
NITRATE UR QL: NEGATIVE
NONHDLC SERPL-MCNC: 179 MG/DL
PH UR STRIP: 6 [PH] (ref 5–7)
POTASSIUM SERPL-SCNC: 4.4 MMOL/L (ref 3.4–5.3)
PROT SERPL-MCNC: 7.2 G/DL (ref 6.4–8.3)
PSA SERPL DL<=0.01 NG/ML-MCNC: 1.42 NG/ML (ref 0–6.5)
SODIUM SERPL-SCNC: 140 MMOL/L (ref 135–145)
SP GR UR STRIP: 1.02 (ref 1–1.03)
TRIGL SERPL-MCNC: 161 MG/DL
UROBILINOGEN UR STRIP-MCNC: NORMAL MG/DL

## 2024-10-21 PROCEDURE — G0439 PPPS, SUBSEQ VISIT: HCPCS | Performed by: INTERNAL MEDICINE

## 2024-10-21 PROCEDURE — 80053 COMPREHEN METABOLIC PANEL: CPT | Performed by: INTERNAL MEDICINE

## 2024-10-21 PROCEDURE — 80061 LIPID PANEL: CPT | Performed by: INTERNAL MEDICINE

## 2024-10-21 PROCEDURE — G0103 PSA SCREENING: HCPCS | Performed by: INTERNAL MEDICINE

## 2024-10-21 PROCEDURE — 99214 OFFICE O/P EST MOD 30 MIN: CPT | Mod: 25 | Performed by: INTERNAL MEDICINE

## 2024-10-21 PROCEDURE — 81003 URINALYSIS AUTO W/O SCOPE: CPT | Performed by: INTERNAL MEDICINE

## 2024-10-21 PROCEDURE — 83036 HEMOGLOBIN GLYCOSYLATED A1C: CPT | Performed by: INTERNAL MEDICINE

## 2024-10-21 PROCEDURE — 82248 BILIRUBIN DIRECT: CPT | Performed by: INTERNAL MEDICINE

## 2024-10-21 PROCEDURE — 36415 COLL VENOUS BLD VENIPUNCTURE: CPT | Performed by: INTERNAL MEDICINE

## 2024-10-21 SDOH — HEALTH STABILITY: PHYSICAL HEALTH: ON AVERAGE, HOW MANY DAYS PER WEEK DO YOU ENGAGE IN MODERATE TO STRENUOUS EXERCISE (LIKE A BRISK WALK)?: 0 DAYS

## 2024-10-21 SDOH — HEALTH STABILITY: PHYSICAL HEALTH: ON AVERAGE, HOW MANY MINUTES DO YOU ENGAGE IN EXERCISE AT THIS LEVEL?: 0 MIN

## 2024-10-21 ASSESSMENT — PAIN SCALES - GENERAL: PAINLEVEL: NO PAIN (0)

## 2024-10-21 ASSESSMENT — SOCIAL DETERMINANTS OF HEALTH (SDOH): HOW OFTEN DO YOU GET TOGETHER WITH FRIENDS OR RELATIVES?: THREE TIMES A WEEK

## 2024-10-21 NOTE — PROGRESS NOTES
"Preventive Care Visit  [facility]  Mina Walters DO, Internal Medicine  Oct 21, 2024      Assessment & Plan     Medicare annual wellness visit, subsequent      Controlled type 2 diabetes mellitus without complication, without long-term current use of insulin (H)    - Hemoglobin A1c; Future  - Basic metabolic panel  (Ca, Cl, CO2, Creat, Gluc, K, Na, BUN); Future  - UA Macroscopic with reflex to Microscopic and Culture - Lab Collect; Future    Hyperlipidemia LDL goal <130    - Lipid panel reflex to direct LDL Fasting; Future  - Hepatic panel (Albumin, ALT, AST, Bili, Alk Phos, TP); Future    Essential hypertension with goal blood pressure less than 140/90    - UA Macroscopic with reflex to Microscopic and Culture - Lab Collect; Future    Benign prostatic hyperplasia with urinary frequency  Controlled.  Continue current medication    History of colonic polyps  Continue regular serial colonoscopies    Morbid obesity with BMI of 45.0-49.9, adult (H)  Recommend weight loss to responsible caloric restriction and exercise    LUCAS (obstructive sleep apnea)  Continue CPAP    Screening for prostate cancer  Screening  - PSA, screen; Future    Patient has been advised of split billing requirements and indicates understanding: Yes        BMI  Estimated body mass index is 44.4 kg/m  as calculated from the following:    Height as of this encounter: 1.727 m (5' 8\").    Weight as of this encounter: 132.5 kg (292 lb).   Weight management plan: Discussed healthy diet and exercise guidelines    Counseling  Appropriate preventive services were addressed with this patient via screening, questionnaire, or discussion as appropriate for fall prevention, nutrition, physical activity, Tobacco-use cessation, social engagement, weight loss and cognition.  Checklist reviewing preventive services available has been given to the patient.  Reviewed patient's diet, addressing concerns and/or questions. "   The patient was instructed to see the dentist every 6 months.   He is at risk for psychosocial distress and has been provided with information to reduce risk.   The patient was provided with written information regarding signs of hearing loss.       MEDICATIONS:  Continue current medications without change  Work on weight loss  Regular exercise    Elmer Contreras is a 78 year old, presenting for the following:  Physical (Checking on cholesterol, hasn't been taking the meds the last 2 because of the cost)        10/21/2024     8:56 AM   Additional Questions   Roomed by Bee         Health Care Directive  Patient has a Health Care Directive on file  Advance care planning document is on file and is current.    HPI              10/21/2024   General Health   How would you rate your overall physical health? Good   Feel stress (tense, anxious, or unable to sleep) Only a little      (!) STRESS CONCERN      10/21/2024   Nutrition   Diet: Regular (no restrictions)    Breakfast skipped       Multiple values from one day are sorted in reverse-chronological order         10/21/2024   Exercise   Days per week of moderate/strenous exercise 0 days   Average minutes spent exercising at this level 0 min      (!) EXERCISE CONCERN      10/21/2024   Social Factors   Frequency of gathering with friends or relatives Three times a week   Worry food won't last until get money to buy more No   Food not last or not have enough money for food? No   Do you have housing? (Housing is defined as stable permanent housing and does not include staying ouside in a car, in a tent, in an abandoned building, in an overnight shelter, or couch-surfing.) Yes   Are you worried about losing your housing? No   Lack of transportation? No   Unable to get utilities (heat,electricity)? No            10/21/2024   Fall Risk   Fallen 2 or more times in the past year? Yes    Yes   Trouble with walking or balance? No    No   Reason Gait Speed Test Not Completed  Patient declines       Multiple values from one day are sorted in reverse-chronological order          10/21/2024   Activities of Daily Living- Home Safety   Needs help with the following daily activites None of the above   Safety concerns in the home None of the above            10/21/2024   Dental   Dentist two times every year? (!) NO            10/21/2024   Hearing Screening   Hearing concerns? (!) I NEED TO ASK PEOPLE TO SPEAK UP OR REPEAT THEMSELVES.    (!) IT'S HARD TO FOLLOW A CONVERSATION IN A NOISY RESTAURANT OR CROWDED ROOM.    (!) TROUBLE UNDERSTANDING SOFT OR WHISPERED SPEECH.       Multiple values from one day are sorted in reverse-chronological order         10/21/2024   Driving Risk Screening   Patient/family members have concerns about driving No            10/21/2024   General Alertness/Fatigue Screening   Have you been more tired than usual lately? No            10/21/2024   Urinary Incontinence Screening   Bothered by leaking urine in past 6 months No            10/21/2024   TB Screening   Were you born outside of the [place]? No            Today's PHQ-2 Score:       10/21/2024     8:37 AM   PHQ-2 ( 1999 [employer])   Q1: Little interest or pleasure in doing things 0   Q2: Feeling down, depressed or hopeless 0   PHQ-2 Score 0   Q1: Little interest or pleasure in doing things Not at all   Q2: Feeling down, depressed or hopeless Not at all   PHQ-2 Score 0           10/21/2024   Substance Use   Alcohol more than 3/day or more than 7/wk No   Do you have a current opioid prescription? No   How severe/bad is pain from 1 to 10? 0/10 (No Pain)   Do you use any other substances recreationally? No        Social History     Tobacco Use    Smoking status: Never    Smokeless tobacco: Never   Vaping Use    Vaping status: Never Used   Substance Use Topics    Alcohol use: Yes     Alcohol/week: 0.0 standard drinks of alcohol     Comment: occasional    Drug use: No       ASCVD Risk   The 10-year ASCVD risk score  (Kanika GALLEGOS, et al., 2019) is: 58.3%    Values used to calculate the score:      Age: 78 years      Sex: Male      Is Non- : No      Diabetic: Yes      Tobacco smoker: No      Systolic Blood Pressure: 148 mmHg      Is BP treated: Yes      HDL Cholesterol: 51 mg/dL      Total Cholesterol: 189 mg/dL            Reviewed and updated as needed this visit by Provider                    Past Medical History:   Diagnosis Date    Benign non-nodular prostatic hyperplasia, presence of lower urinary tract symptoms unspecified 5/31/2016    Colonic polyps     Family history of colon cancer 4/4/2016    brother with colonoscopy     Hyperlipidemia LDL goal <100 5/3/2017    Hyperlipidemia LDL goal <130 12/8/2016    LUCAS (obstructive sleep apnea) 4/4/2016    Rash 4/4/2016     Past Surgical History:   Procedure Laterality Date    ARTHROSCOPY KNEE WITH MEDIAL MENISCECTOMY  6/6/2014    Procedure: ARTHROSCOPY KNEE WITH MEDIAL MENISCECTOMY;  Surgeon: Ramana Dominguez DO;  [facility]    ARTHROSCOPY KNEE WITH MEDIAL MENISCECTOMY Right 8/3/2018    Procedure: ARTHROSCOPY KNEE WITH MEDIAL MENISCECTOMY;  Right knee arthroscopy with partial meniscectomy;  Surgeon: Ramana Dominguez DO;  [facility]    COLONOSCOPY  11/29/2011    Polypectomy.    COLONOSCOPY N/A 11/12/2014    Procedure: COLONOSCOPY;  Surgeon: Brooks Burris MD;  [facility]    COLONOSCOPY N/A 9/25/2020    Procedure: Colonoscopy with  polypectomy by biopsy;  Surgeon: Brooks Burris MD;  [facility]    COLONOSCOPY N/A 11/8/2023    Procedure: COLONOSCOPY, FLEXIBLE, WITH LESION REMOVAL USING SNARE;  Surgeon: Brad Juarez MD;  Location: [facility]    HC VASECTOMY UNILAT/BILAT W POSTOP SEMEN  1977    Vasectomy    PHACOEMULSIFICATION WITH STANDARD INTRAOCULAR LENS IMPLANT Left 10/5/2023    Procedure: Phacoemulsification with standard intraocular lens implant left;  Surgeon: Ricardo Ardon MD;  [facility]     PHACOEMULSIFICATION WITH STANDARD INTRAOCULAR LENS IMPLANT Right 10/19/2023    Procedure: Right Phacoemulsification with standard intraocular lens implant;  Surgeon: Shimon Johnson MD;  Location:  OR    [facility]OPSY  [facility]24[facility]    [facility]  [facility]    Polypectomy.     Lab work is in process  Labs reviewed in EPIC  BP Readings from Last 3 Encounters:   10/21/24 (!) 148/84   11/08/23 (!) 160/83   10/19/23 (!) 164/83    Wt Readings from Last 3 Encounters:   10/21/24 132.5 kg (292 lb)   09/20/23 134.8 kg (297 lb 1.6 oz)   08/10/22 136.5 kg (301 lb)                  Patient Active Problem List   Diagnosis    Essential hypertension with goal blood pressure less than 140/90    Morbid obesity with BMI of 45.0-49.9, adult (H)    Family history of colon cancer    Rash    LUCAS (obstructive sleep apnea)    Benign non-nodular prostatic hyperplasia    Primary osteoarthritis of left knee    Hyperlipidemia LDL goal <100    Benign prostatic hyperplasia with urinary frequency    Primary osteoarthritis of right knee    Complex tear of medial meniscus of right knee as current injury, initial encounter    S/P arthroscopy of right knee    Strain of right biceps muscle, subsequent encounter    Type 2 diabetes mellitus with other specified complication, without long-term current use of insulin (H)     Past Surgical History:   Procedure Laterality Date    ARTHROSCOPY KNEE WITH MEDIAL MENISCECTOMY  6/6/2014    Procedure: ARTHROSCOPY KNEE WITH MEDIAL MENISCECTOMY;  Surgeon: Ramana Dominguez DO;  Location:  OR    ARTHROSCOPY KNEE WITH MEDIAL MENISCECTOMY Right 8/3/2018    Procedure: ARTHROSCOPY KNEE WITH MEDIAL MENISCECTOMY;  Right knee arthroscopy with partial meniscectomy;  Surgeon: Ramana Dominguez DO;  [facility]    COLONOSCOPY  11/29/2011    Polypectomy.    COLONOSCOPY N/A 11/12/2014    Procedure: COLONOSCOPY;  Surgeon: Brooks Burris MD;  Location:  GI     COLONOSCOPY N/A 9/25/2020    Procedure: Colonoscopy with  polypectomy by biopsy;  Surgeon: Brooks Burris MD;  [facility]    COLONOSCOPY N/A 11/8/2023    Procedure: COLONOSCOPY, FLEXIBLE, WITH LESION REMOVAL USING SNARE;  Surgeon: Brad Juarez MD;  [facility]    HC VASECTOMY UNILAT/BILAT W POSTOP SEMEN  1977    Vasectomy    PHACOEMULSIFICATION WITH STANDARD INTRAOCULAR LENS IMPLANT Left 10/5/2023    Procedure: Phacoemulsification with standard intraocular lens implant left;  Surgeon: Ricardo Ardon MD;  [facility]    PHACOEMULSIFICATION WITH STANDARD INTRAOCULAR LENS IMPLANT Right 10/19/2023    Procedure: Right Phacoemulsification with standard intraocular lens implant;  Surgeon: Shimon Johnson MD;  [facility]    [facility] COLONOSCOPY W BIOPSY  11/24/08    [facility] COLONOSCOPY W/WO BRUSH/WASH  11/21/2005    Polypectomy.       Social History     Tobacco Use    Smoking status: Never    Smokeless tobacco: Never   Substance Use Topics    Alcohol use: Yes     Alcohol/week: 0.0 standard drinks of alcohol     Comment: occasional     Family History   Problem Relation Age of Onset    Cancer Mother         Lung Cancer    Cancer - colorectal Father         Stomach Cancer    Cancer Brother          Current Outpatient Medications   Medication Sig Dispense Refill    Acetaminophen (TYLENOL PO) As needed      ADVIL 200 MG OR TABS  120 0    alcohol swab prep pads Use to swab area of injection/federica as directed. 100 each 3    aspirin 81 MG tablet Take by mouth daily 30 tablet     Bempedoic Acid (NEXLETOL) 180 MG TABS Take 1 tablet by mouth daily 90 tablet 0    bisacodyl (DULCOLAX) 5 MG EC tablet 2 days prior to procedure, take 2 tablets at 4 pm. 1 day prior to procedure, take 2 tablets at 4 pm. For additional instructions refer to your colonoscopy prep instructions. 4 tablet 0    Cholecalciferol (VITAMIN D) 2000 UNITS tablet Take 1 tablet by mouth daily.      clobetasol (TEMOVATE) 0.05 % cream  Small amount to affected areas BID 60 g 1    desoximetasone (TOPICORT) 0.25 % cream use as directed to legs 100 g 1    finasteride (PROSCAR) 5 MG tablet Take 1 tablet by mouth once daily 90 tablet 0    losartan-hydrochlorothiazide (HYZAAR) 100-25 MG tablet Take 1 tablet by mouth once daily 30 tablet 0    metFORMIN (GLUCOPHAGE) 500 MG tablet TAKE 1 TABLET BY MOUTH TWICE DAILY WITH MEALS 60 tablet 0    metoprolol succinate ER (TOPROL XL) 100 MG 24 hr tablet Take 1 tablet by mouth once daily 30 tablet 0    NIFEdipine ER (ADALAT CC) 60 MG 24 hr tablet Take 1 tablet (60 mg) by mouth daily 90 tablet 3    order for DME Equipment being ordered: CPAP and related hardware, mask and tubing as well heated humidity equipment. 1 Units 0    polyethylene glycol (GOLYTELY) 236 g suspension 2 days prior at 5pm, mix and drink half of a jug of Golytely. Drink an 8 oz. glass of Golytely every 15 minutes until half of the jug is gone. Place remainder of Golytely in the refrigerator. 1 day prior at 5 pm, drink the 2nd half of a jug of Golytely bowel prep. 6 hours before your check-in time, drink an 8 oz. glass of Golytely every 15 minutes until half of the 2nd jug of Golytely is gone. Discard remainder of second jug. 8000 mL 0    VITAMIN E 400 UNIT OR CAPS   0    ezetimibe (ZETIA) 10 MG tablet Take 1 tablet (10 mg) by mouth daily (Patient not taking: Reported on 10/21/2024) 90 tablet 3    thin (NO BRAND SPECIFIED) lancets Use with lanceting device. To accompany: Blood Glucose Monitor Brands: per insurance. (Patient not taking: Reported on 9/20/2023) 100 each 6     Allergies   Allergen Reactions    Norvasc [Amlodipine] Muscle Pain (Myalgia)    Simvastatin Rash     Rash and edema     Recent Labs   Lab Test 09/20/23  1036 08/10/22  0928 08/06/21  0913 08/06/21  0913 09/15/20  0937 05/08/19  0926 05/03/17  1326 05/02/17  1532 12/08/16  0920   A1C 6.3* 6.2*  --  6.0* 5.6 5.8*   < >  --   --    * 98  --   --  120* 101*   < > 81 75   HDL  51 49  --   --  52 48   < >  --  51   TRIG 154* 196*  --   --  213* 128   < >  --  185*   ALT  --   --   --   --   --   --   --  28 20   CR 0.98 0.96   < > 1.01 0.98 0.83   < > 0.87 0.87   GFRESTIMATED 80 83   < > 73 76 88   < > 87 87   GFRESTBLACK  --   --   --   --  89 >90   < > >90   GFR Calc   >90   GFR Calc     POTASSIUM 4.4 4.1   < > 4.2 4.0 4.2   < > 3.7 3.8   TSH  --   --   --   --   --  1.15  --   --  2.15    < > = values in this interval not displayed.      Current providers sharing in care for this patient include:  Patient Care Team:  Mina Walters DO as PCP - General (Internal Medicine)  Mina Walters DO as Assigned PCP    The following health maintenance items are reviewed in Epic and correct as of today:  Health Maintenance   Topic Date Due    EYE EXAM  Never done    ZOSTER IMMUNIZATION (2 of 3) 10/31/2013    DIABETIC FOOT EXAM  09/15/2021    RSV VACCINE (1 - 1-dose 75+ series) Never done    DTAP/TDAP/TD IMMUNIZATION (2 - Td or Tdap) 07/15/2023    MEDICARE ANNUAL WELLNESS VISIT  08/10/2023    A1C  03/20/2024    INFLUENZA VACCINE (1) 09/01/2024    COVID-19 Vaccine (7 - 2024-25 season) 09/01/2024    BMP  09/20/2024    LIPID  09/20/2024    MICROALBUMIN  09/20/2024    ANNUAL REVIEW OF HM ORDERS  09/20/2024    FALL RISK ASSESSMENT  10/21/2025    ADVANCE CARE PLANNING  08/10/2027    COLORECTAL CANCER SCREENING  10/04/2027    HEPATITIS C SCREENING  Completed    PHQ-2 (once per calendar year)  Completed    Pneumococcal Vaccine: 65+ Years  Completed    HPV IMMUNIZATION  Aged Out    MENINGITIS IMMUNIZATION  Aged Out    RSV MONOCLONAL ANTIBODY  Aged Out         Review of Systems  CONSTITUTIONAL: NEGATIVE for fever, chills, change in weight  INTEGUMENTARY/SKIN: NEGATIVE for worrisome rashes, moles or lesions  EYES: NEGATIVE for vision changes or irritation  ENT/MOUTH: NEGATIVE for ear, mouth and throat problems  RESP: NEGATIVE for significant cough or  "SOB  BREAST: NEGATIVE for masses, tenderness or discharge  CV: NEGATIVE for chest pain, palpitations or peripheral edema  GI: NEGATIVE for nausea, abdominal pain, heartburn, or change in bowel habits  : NEGATIVE for frequency, dysuria, or hematuria  MUSCULOSKELETAL: Patient notes some hip pain more so on the left than the right.  Is anticipating arthroplasty may be next year.  NEURO: NEGATIVE for weakness, dizziness or paresthesias  ENDOCRINE: NEGATIVE for temperature intolerance, skin/hair changes  HEME: NEGATIVE for bleeding problems  PSYCHIATRIC: NEGATIVE for changes in mood or affect     Objective    Exam  BP (!) 148/84   Pulse 57   Temp 97.2  F (36.2  C) (Temporal)   Resp 16   Ht 1.727 m (5' 8\")   Wt 132.5 kg (292 lb)   SpO2 98%   BMI 44.40 kg/m     Estimated body mass index is 44.4 kg/m  as calculated from the following:    Height as of this encounter: 1.727 m (5' 8\").    Weight as of this encounter: 132.5 kg (292 lb).    Physical Exam  GENERAL: alert and no distress  EYES: Eyes grossly normal to inspection, PERRL and conjunctivae and sclerae normal  HENT: ear canals and TM's normal, nose and mouth without ulcers or lesions  NECK: no adenopathy, no asymmetry, masses, or scars  RESP: lungs clear to auscultation - no rales, rhonchi or wheezes  CV: regular rate and rhythm, normal S1 S2, no S3 or S4, no murmur, click or rub, no peripheral edema  GI: Abdomen is soft, without rebound, guarding or tenderness. Bowel sounds are appropriate. No renal bruits are heard.  Abdomen is obese.  MS: no gross musculoskeletal defects noted, no edema.  Crepitance in the hips noted.  SKIN: no suspicious lesions or rashes  NEURO: Normal strength and tone, mentation intact and speech normal  PSYCH: mentation appears normal, affect normal/bright        10/21/2024   Mini Cog   Clock Draw Score 2 Normal   3 Item Recall 2 objects recalled   Mini Cog Total Score 4                 Signed Electronically by: Mina Salinas " DO Selena

## 2024-10-22 DIAGNOSIS — I10 ESSENTIAL HYPERTENSION WITH GOAL BLOOD PRESSURE LESS THAN 140/90: ICD-10-CM

## 2024-10-31 ENCOUNTER — TELEPHONE (OUTPATIENT)
Dept: INTERNAL MEDICINE | Facility: CLINIC | Age: 79
End: 2024-10-31
Payer: COMMERCIAL

## 2024-10-31 DIAGNOSIS — E78.5 HYPERLIPIDEMIA LDL GOAL <130: ICD-10-CM

## 2024-10-31 DIAGNOSIS — E11.9 CONTROLLED TYPE 2 DIABETES MELLITUS WITHOUT COMPLICATION, WITHOUT LONG-TERM CURRENT USE OF INSULIN (H): ICD-10-CM

## 2024-10-31 DIAGNOSIS — N40.1 BENIGN PROSTATIC HYPERPLASIA WITH URINARY FREQUENCY: ICD-10-CM

## 2024-10-31 DIAGNOSIS — I10 ESSENTIAL HYPERTENSION WITH GOAL BLOOD PRESSURE LESS THAN 140/90: ICD-10-CM

## 2024-10-31 DIAGNOSIS — R35.0 BENIGN PROSTATIC HYPERPLASIA WITH URINARY FREQUENCY: ICD-10-CM

## 2024-10-31 NOTE — TELEPHONE ENCOUNTER
Medication Question or Refill    Contacts       Contact Date/Time Type Contact Phone/Fax    10/31/2024 03:37 PM CDT Phone (Incoming) Ben Ceron (Self) [phone] (M)            What medication are you calling about (include dose and sig)?: All current medications    Preferred Pharmacy:     [facility] [address] [place] [address] [address]  [address]  [facility] [address]  Phone: [phone] Fax: [phone]      Controlled Substance Agreement on file:   CSA -- Patient Level:    CSA: None found at the patient level.       Who prescribed the medication?: Dr. Walters    Do you need a refill? Yes for the next 12 months, patient will be in another [place], patient stated that it was discussed at appointment on 10/21/24    When did you use the medication last? Currently    Patient offered an appointment? No    Do you have any questions or concerns?  No      Could we send this information to you in [facility] or would you prefer to receive a phone call?:   Patient would prefer a phone call   Okay to leave a detailed message?: Yes at Cell number on file:    Telephone Information:   Mobile [phone]

## 2024-11-01 RX ORDER — BEMPEDOIC ACID 180 MG/1
1 TABLET, FILM COATED ORAL DAILY
Qty: 90 TABLET | Refills: 3 | Status: CANCELLED | OUTPATIENT
Start: 2024-11-01

## 2024-11-01 NOTE — TELEPHONE ENCOUNTER
Please confirm pharmacy - note states patient is going to be out of state, but requesting to be filled at [place], [place]. Is this where he wants prescriptions filled at?    Tresa PERSAUDN, RN

## 2024-11-01 NOTE — TELEPHONE ENCOUNTER
Patient wants refills sent to [facility] pharmacy - he uses a [facility] pharmacy in [place] as well and they are able to transfer his refills.    Edda REDDINGO/

## 2024-11-06 RX ORDER — METOPROLOL SUCCINATE 100 MG/1
100 TABLET, EXTENDED RELEASE ORAL DAILY
Qty: 90 TABLET | Refills: 3 | Status: SHIPPED | OUTPATIENT
Start: 2024-11-06

## 2024-11-06 RX ORDER — FINASTERIDE 5 MG/1
1 TABLET, FILM COATED ORAL DAILY
Qty: 90 TABLET | Refills: 3 | Status: SHIPPED | OUTPATIENT
Start: 2024-11-06

## 2024-11-06 RX ORDER — LOSARTAN POTASSIUM AND HYDROCHLOROTHIAZIDE 25; 100 MG/1; MG/1
1 TABLET ORAL DAILY
Qty: 90 TABLET | Refills: 3 | Status: SHIPPED | OUTPATIENT
Start: 2024-11-06

## 2025-05-03 ENCOUNTER — HEALTH MAINTENANCE LETTER (OUTPATIENT)
Age: 80
End: 2025-05-03

## (undated) DEVICE — GLOVE ESTEEM BLUE W/NEU-THERA 7.5  2D73PB75

## (undated) DEVICE — DRAPE STERI U 1015

## (undated) DEVICE — TUBING SUCTION 6"X3/16" N56A

## (undated) DEVICE — SOL WATER IRRIG 1000ML BOTTLE 07139-09

## (undated) DEVICE — TUBING INFLOW CROSSFLOW 0450-000-100

## (undated) DEVICE — GLOVE PROTEXIS W/NEU-THERA 7.0  2D73TE70

## (undated) DEVICE — SOL NACL 0.9% IRRIG 1000ML BOTTLE 07138-09

## (undated) DEVICE — GLOVE PROTEXIS BLUE W/NEU-THERA 8.0  2D73EB80

## (undated) DEVICE — SOL NACL 0.9% IRRIG 3000ML BAG 07972-08

## (undated) DEVICE — SNARE CAPIVATOR ROUND COLD SNR BX10 M00561101

## (undated) DEVICE — KIT ENDO TURNOVER/PROCEDURE CARRY-ON 101822

## (undated) DEVICE — DRSG GAUZE 4X4" TRAY

## (undated) DEVICE — DRAPE EXTREMITY W/ARMBOARD 29405

## (undated) DEVICE — CAST PADDING 4" WEBRIL UNSTERILE

## (undated) DEVICE — TUBING SUCTION 12"X1/4" N612

## (undated) DEVICE — BNDG COBAN 6"X5YDS STERILE

## (undated) DEVICE — SOL WATER IRRIG 1000ML BOTTLE 2F7114

## (undated) DEVICE — GLOVE PROTEGRITY 7.5 LATEX

## (undated) DEVICE — GLOVE PROTEXIS W/NEU-THERA 7.5  2D73TE75

## (undated) DEVICE — PACK ARTHROSCOPY KNEE LATEX FREE SOP32CAFCN

## (undated) DEVICE — SYR 50ML LL W/O NDL 309653

## (undated) RX ORDER — FENTANYL CITRATE 50 UG/ML
INJECTION, SOLUTION INTRAMUSCULAR; INTRAVENOUS
Status: DISPENSED
Start: 2018-08-03

## (undated) RX ORDER — FENTANYL CITRATE 50 UG/ML
INJECTION, SOLUTION INTRAMUSCULAR; INTRAVENOUS
Status: DISPENSED
Start: 2020-09-25

## (undated) RX ORDER — MORPHINE SULFATE 0.5 MG/ML
INJECTION, SOLUTION EPIDURAL; INTRATHECAL; INTRAVENOUS
Status: DISPENSED
Start: 2018-08-03

## (undated) RX ORDER — FENTANYL CITRATE 50 UG/ML
INJECTION, SOLUTION INTRAMUSCULAR; INTRAVENOUS
Status: DISPENSED
Start: 2023-10-19

## (undated) RX ORDER — PROPOFOL 10 MG/ML
INJECTION, EMULSION INTRAVENOUS
Status: DISPENSED
Start: 2018-08-03

## (undated) RX ORDER — FENTANYL CITRATE 50 UG/ML
INJECTION, SOLUTION INTRAMUSCULAR; INTRAVENOUS
Status: DISPENSED
Start: 2023-10-05

## (undated) RX ORDER — EPINEPHRINE 1 MG/ML
INJECTION, SOLUTION, CONCENTRATE INTRAVENOUS
Status: DISPENSED
Start: 2018-08-03

## (undated) RX ORDER — LIDOCAINE HYDROCHLORIDE 10 MG/ML
INJECTION, SOLUTION EPIDURAL; INFILTRATION; INTRACAUDAL; PERINEURAL
Status: DISPENSED
Start: 2018-08-03

## (undated) RX ORDER — BUPIVACAINE HYDROCHLORIDE 2.5 MG/ML
INJECTION, SOLUTION EPIDURAL; INFILTRATION; INTRACAUDAL
Status: DISPENSED
Start: 2018-08-03

## (undated) RX ORDER — LIDOCAINE HYDROCHLORIDE AND EPINEPHRINE 10; 10 MG/ML; UG/ML
INJECTION, SOLUTION INFILTRATION; PERINEURAL
Status: DISPENSED
Start: 2018-08-03